# Patient Record
Sex: FEMALE | Race: WHITE | Employment: UNEMPLOYED | URBAN - NONMETROPOLITAN AREA
[De-identification: names, ages, dates, MRNs, and addresses within clinical notes are randomized per-mention and may not be internally consistent; named-entity substitution may affect disease eponyms.]

---

## 2021-04-01 ENCOUNTER — HOSPITAL ENCOUNTER (EMERGENCY)
Age: 71
Discharge: HOME | End: 2021-04-01
Attending: EMERGENCY MEDICINE

## 2021-04-01 VITALS
WEIGHT: 155 LBS | RESPIRATION RATE: 15 BRPM | OXYGEN SATURATION: 98 % | SYSTOLIC BLOOD PRESSURE: 138 MMHG | HEART RATE: 85 BPM | TEMPERATURE: 98.2 F | DIASTOLIC BLOOD PRESSURE: 82 MMHG

## 2021-04-01 DIAGNOSIS — N39.0 ACUTE UTI: Primary | ICD-10-CM

## 2021-04-01 DIAGNOSIS — E11.65 HYPERGLYCEMIA DUE TO DIABETES MELLITUS (HCC): ICD-10-CM

## 2021-04-01 DIAGNOSIS — E86.0 DEHYDRATION: ICD-10-CM

## 2021-04-01 LAB
ALBUMIN SERPL-MCNC: 3.6 G/DL (ref 3.5–5.1)
ALP BLD-CCNC: 91 U/L (ref 38–126)
ALT SERPL-CCNC: 16 U/L (ref 11–66)
ANION GAP SERPL CALCULATED.3IONS-SCNC: 13 MEQ/L (ref 8–16)
AST SERPL-CCNC: 15 U/L (ref 5–40)
BACTERIA: ABNORMAL /HPF
BASE EXCESS MIXED: 0 MMOL/L (ref -2–3)
BASOPHILS # BLD: 0.5 %
BASOPHILS ABSOLUTE: 0 THOU/MM3 (ref 0–0.1)
BILIRUB SERPL-MCNC: 0.3 MG/DL (ref 0.3–1.2)
BILIRUBIN DIRECT: < 0.2 MG/DL (ref 0–0.3)
BILIRUBIN URINE: NEGATIVE
BLOOD, URINE: ABNORMAL
BUN BLDV-MCNC: 23 MG/DL (ref 7–22)
CALCIUM SERPL-MCNC: 9.5 MG/DL (ref 8.5–10.5)
CASTS 2: ABNORMAL /LPF
CASTS UA: ABNORMAL /LPF
CHARACTER, URINE: ABNORMAL
CHLORIDE BLD-SCNC: 99 MEQ/L (ref 98–111)
CO2: 25 MEQ/L (ref 23–33)
COLLECTED BY:: ABNORMAL
COLOR: YELLOW
CREAT SERPL-MCNC: 0.9 MG/DL (ref 0.4–1.2)
CRYSTALS, UA: ABNORMAL
DEVICE: ABNORMAL
EKG ATRIAL RATE: 87 BPM
EKG P AXIS: 69 DEGREES
EKG P-R INTERVAL: 134 MS
EKG Q-T INTERVAL: 360 MS
EKG QRS DURATION: 82 MS
EKG QTC CALCULATION (BAZETT): 433 MS
EKG R AXIS: 57 DEGREES
EKG T AXIS: 51 DEGREES
EKG VENTRICULAR RATE: 87 BPM
EOSINOPHIL # BLD: 1.5 %
EOSINOPHILS ABSOLUTE: 0.1 THOU/MM3 (ref 0–0.4)
EPITHELIAL CELLS, UA: ABNORMAL /HPF
ERYTHROCYTE [DISTWIDTH] IN BLOOD BY AUTOMATED COUNT: 13 % (ref 11.5–14.5)
ERYTHROCYTE [DISTWIDTH] IN BLOOD BY AUTOMATED COUNT: 39.3 FL (ref 35–45)
GFR SERPL CREATININE-BSD FRML MDRD: 62 ML/MIN/1.73M2
GLUCOSE BLD-MCNC: 346 MG/DL (ref 70–108)
GLUCOSE BLD-MCNC: 361 MG/DL (ref 70–108)
GLUCOSE URINE: >= 1000 MG/DL
HCO3, MIXED: 24 MMOL/L (ref 23–28)
HCT VFR BLD CALC: 32.6 % (ref 37–47)
HEMOGLOBIN: 10.4 GM/DL (ref 12–16)
IMMATURE GRANS (ABS): 0.05 THOU/MM3 (ref 0–0.07)
IMMATURE GRANULOCYTES: 0.8 %
KETONES, URINE: NEGATIVE
LEUKOCYTE ESTERASE, URINE: ABNORMAL
LYMPHOCYTES # BLD: 20.4 %
LYMPHOCYTES ABSOLUTE: 1.3 THOU/MM3 (ref 1–4.8)
MCH RBC QN AUTO: 26.3 PG (ref 26–33)
MCHC RBC AUTO-ENTMCNC: 31.9 GM/DL (ref 32.2–35.5)
MCV RBC AUTO: 82.5 FL (ref 81–99)
MISCELLANEOUS 2: ABNORMAL
MONOCYTES # BLD: 9.3 %
MONOCYTES ABSOLUTE: 0.6 THOU/MM3 (ref 0.4–1.3)
NITRITE, URINE: POSITIVE
NUCLEATED RED BLOOD CELLS: 0 /100 WBC
O2 SAT, MIXED: 48 %
OSMOLALITY CALCULATION: 291.3 MOSMOL/KG (ref 275–300)
PCO2, MIXED VENOUS: 36 MMHG (ref 41–51)
PH UA: 5 (ref 5–9)
PH, MIXED: 7.44 (ref 7.31–7.41)
PLATELET # BLD: 243 THOU/MM3 (ref 130–400)
PMV BLD AUTO: 11.7 FL (ref 9.4–12.4)
PO2 MIXED: 25 MMHG (ref 25–40)
POTASSIUM REFLEX MAGNESIUM: 4.5 MEQ/L (ref 3.5–5.2)
PROTEIN UA: 30
RBC # BLD: 3.95 MILL/MM3 (ref 4.2–5.4)
RBC URINE: ABNORMAL /HPF
RENAL EPITHELIAL, UA: ABNORMAL
SEG NEUTROPHILS: 67.5 %
SEGMENTED NEUTROPHILS ABSOLUTE COUNT: 4.4 THOU/MM3 (ref 1.8–7.7)
SODIUM BLD-SCNC: 137 MEQ/L (ref 135–145)
SPECIFIC GRAVITY, URINE: 1.02 (ref 1–1.03)
TOTAL PROTEIN: 7.2 G/DL (ref 6.1–8)
UROBILINOGEN, URINE: 0.2 EU/DL (ref 0–1)
WBC # BLD: 6.5 THOU/MM3 (ref 4.8–10.8)
WBC UA: > 200 /HPF
YEAST: ABNORMAL

## 2021-04-01 PROCEDURE — 87186 SC STD MICRODIL/AGAR DIL: CPT

## 2021-04-01 PROCEDURE — 94761 N-INVAS EAR/PLS OXIMETRY MLT: CPT

## 2021-04-01 PROCEDURE — 36415 COLL VENOUS BLD VENIPUNCTURE: CPT

## 2021-04-01 PROCEDURE — 93005 ELECTROCARDIOGRAM TRACING: CPT | Performed by: STUDENT IN AN ORGANIZED HEALTH CARE EDUCATION/TRAINING PROGRAM

## 2021-04-01 PROCEDURE — 96360 HYDRATION IV INFUSION INIT: CPT

## 2021-04-01 PROCEDURE — 2580000003 HC RX 258: Performed by: STUDENT IN AN ORGANIZED HEALTH CARE EDUCATION/TRAINING PROGRAM

## 2021-04-01 PROCEDURE — 87086 URINE CULTURE/COLONY COUNT: CPT

## 2021-04-01 PROCEDURE — 96361 HYDRATE IV INFUSION ADD-ON: CPT

## 2021-04-01 PROCEDURE — 81001 URINALYSIS AUTO W/SCOPE: CPT

## 2021-04-01 PROCEDURE — 80076 HEPATIC FUNCTION PANEL: CPT

## 2021-04-01 PROCEDURE — 87077 CULTURE AEROBIC IDENTIFY: CPT

## 2021-04-01 PROCEDURE — 99284 EMERGENCY DEPT VISIT MOD MDM: CPT

## 2021-04-01 PROCEDURE — 82803 BLOOD GASES ANY COMBINATION: CPT

## 2021-04-01 PROCEDURE — 80048 BASIC METABOLIC PNL TOTAL CA: CPT

## 2021-04-01 PROCEDURE — 85025 COMPLETE CBC W/AUTO DIFF WBC: CPT

## 2021-04-01 PROCEDURE — 82948 REAGENT STRIP/BLOOD GLUCOSE: CPT

## 2021-04-01 RX ORDER — 0.9 % SODIUM CHLORIDE 0.9 %
1000 INTRAVENOUS SOLUTION INTRAVENOUS ONCE
Status: COMPLETED | OUTPATIENT
Start: 2021-04-01 | End: 2021-04-01

## 2021-04-01 RX ORDER — CEPHALEXIN 500 MG/1
500 CAPSULE ORAL 4 TIMES DAILY
Qty: 28 CAPSULE | Refills: 0 | Status: SHIPPED | OUTPATIENT
Start: 2021-04-01 | End: 2021-04-08

## 2021-04-01 RX ADMIN — SODIUM CHLORIDE 1000 ML: 9 INJECTION, SOLUTION INTRAVENOUS at 10:34

## 2021-04-01 ASSESSMENT — ENCOUNTER SYMPTOMS
VOMITING: 1
COUGH: 0
ABDOMINAL PAIN: 0
NAUSEA: 1
WHEEZING: 0
CONSTIPATION: 0
DIARRHEA: 0
SHORTNESS OF BREATH: 0

## 2021-04-01 NOTE — ED NOTES
Pt to the ED with complaints of abnormal labs. Pt reports that she was seen at an office in [address] [place] [address] called \"[facility]\" by Dr. Judith Herrera on 3/29/21 where she had blood work done and was found to have a high BS. Pt was told to come to the ED to get better control over her blood sugar. Pt is currently taking metformin 1000 mg bid for her diabetes. Denies any other medical history.  at this time.            Bibi Hammond RN  04/01/21 1010

## 2021-04-01 NOTE — ED PROVIDER NOTES
[address] [address]          Pt Name: Omega Holguin  MRN: [id number]  [place] 1950  Date of evaluation: 4/1/2021  Treating Resident Physician: Matthieu Barragan MD  Supervising Physician: Dr. Lopez December       Chief Complaint   Patient presents with    Hyperglycemia     History obtained from the patient. HISTORY OF PRESENT ILLNESS    HPI  Omega Holguin is a 69 y.o. female who presents to the emergency department for evaluation of hypoglycemia. She was seen at a clinic called \"[facility] by Dr. Jada Nova in [place] on 03/29. There labs were drawn. The next day labs came back showing and elevated blood sugar in the 400s. Patient was instructed to come to the ED today. The patient takes metformin 1000mg BID for DM2. However, she admits that she is non compliant. She states that she sometimes takes half a pill once a day \"if she feels like it. \" She takes herbals, but no other medications. She has no other medical problems. She admits to some nausea and vomiting 2 days ago. Denies any symptoms currently. No chest pain, palpitations, dizziness, abdominal pain, dysuria, urinary frequency, polydipsia at this time. The patient has no other acute complaints at this time. REVIEW OF SYSTEMS   Review of Systems   Constitutional: Negative for fatigue and fever. HENT: Negative for congestion. Eyes: Negative for visual disturbance. Respiratory: Negative for cough, shortness of breath and wheezing. Cardiovascular: Negative for chest pain and palpitations. Gastrointestinal: Positive for nausea and vomiting. Negative for abdominal pain, constipation and diarrhea. Genitourinary: Negative for frequency, hematuria and urgency. Skin: Negative for rash and wound. Neurological: Negative for weakness and numbness. Psychiatric/Behavioral: Negative for agitation and confusion.          PAST MEDICAL AND SURGICAL supple. No muscular tenderness. Cardiovascular:      Rate and Rhythm: Normal rate and regular rhythm. Pulses: Normal pulses. Heart sounds: No murmur. Pulmonary:      Effort: Pulmonary effort is normal. No respiratory distress. Breath sounds: Normal breath sounds. No wheezing. Abdominal:      General: Abdomen is flat. There is no distension. Palpations: Abdomen is soft. Tenderness: There is no abdominal tenderness. Musculoskeletal: Normal range of motion. Right lower leg: No edema. Left lower leg: No edema. Skin:     General: Skin is warm and dry. Capillary Refill: Capillary refill takes less than 2 seconds. Findings: No rash. Comments: Onychomycosis bilateral toes. No other foot lesions or wounds. Neurological:      General: No focal deficit present. Mental Status: She is alert and oriented to person, place, and time. Sensory: No sensory deficit. Gait: Gait normal.   Psychiatric:         Mood and Affect: Mood normal.         Behavior: Behavior normal.             MEDICAL DECISION MAKING   Initial Assessment:   1. Hyperglycemia due to medication non compliance   2. History of DM2  3. Dehydration   Plan:    BMP and VBG to rule out DKA. CBC and urinalysis with reflex to rule in or out UTI. Start IV hydration for dehydration.  Provide education on the need compliance with diabetes medication regimen.          ED RESULTS   Laboratory results:  Labs Reviewed   CBC WITH AUTO DIFFERENTIAL - Abnormal; Notable for the following components:       Result Value    RBC 3.95 (*)     Hemoglobin 10.4 (*)     Hematocrit 32.6 (*)     MCHC 31.9 (*)     All other components within normal limits   BASIC METABOLIC PANEL W/ REFLEX TO MG FOR LOW K - Abnormal; Notable for the following components:    Glucose 346 (*)     BUN 23 (*)     All other components within normal limits   BLOOD GAS, VENOUS - Abnormal; Notable for the following components:    PH MIXED 7.44 (*)     PCO2, MIXED VENOUS 36 (*)     All other components within normal limits   GLOMERULAR FILTRATION RATE, ESTIMATED - Abnormal; Notable for the following components:    Est, Glom Filt Rate 62 (*)     All other components within normal limits   URINE WITH REFLEXED MICRO - Abnormal; Notable for the following components:    Glucose, Ur >= 1000 (*)     Blood, Urine SMALL (*)     Protein, UA 30 (*)     Nitrite, Urine POSITIVE (*)     Leukocyte Esterase, Urine MODERATE (*)     Character, Urine CLOUDY (*)     All other components within normal limits   POCT GLUCOSE - Abnormal; Notable for the following components:    POC Glucose 361 (*)     All other components within normal limits   CULTURE, REFLEXED, URINE   HEPATIC FUNCTION PANEL   ANION GAP   OSMOLALITY       Radiologic studies results:  No orders to display       ED Medications administered this visit:   Medications   0.9 % sodium chloride bolus (0 mLs Intravenous Stopped 4/1/21 1253)         ED COURSE     ED Course as of Apr 01 1657   Thu Apr 01, 2021   1200 Okay decreased GFR likely secondary to dehydration. Patient received 1 L fluid bolus in the ED. Est, Glom Filt Rate(!): 62 [SHELDON]   1201 Mild anemia, asymptomatic. Likely secondary to iron deficiency. Follow-up outpatient. Hemoglobin Quant(!): 10.4 [SHELDON]   1203 UA positive for nitrites, leukocyte esterase, many bacteria, and 0-2 epithelial cells. Likely secondary to a UTI. Will start patient on antibiotics. Will send for culture.    Urine with Reflexed Micro(!):    Glucose, UA >= 1000(!)   Bilirubin, Urine NEGATIVE   Ketones, Urine NEGATIVE   Specific Gravity, Urine 1.018   Blood, Urine SMALL(!)   pH, UA 5.0   Protein, UA 30(!)   Urobilinogen, Urine 0.2   Nitrite, Urine POSITIVE(!)   Leukocyte Esterase, Urine MODERATE(!)   Color, UA YELLOW   Character, Urine CLOUDY(!)   RBC, UA 0-2   WBC, UA > 200   Epithelial Cells, UA 0-2   Bacteria, UA MANY   Casts UA NONE SEEN   Crystals, UA NONE SEEN   Renal Epithelial, UA NONE SEEN   Yea. .. [SHELDON]   1078 Basic Metabolic Panel w/ Reflex to MG(!):    Sodium 137   Potassium 4.5   Chloride 99   CO2 25   Glucose 346(!)   BUN 23(!)   Creatinine 0.9   Calcium 9.5 [SHELDON]   1206 Slightly elevated with normal creatinine of 0.9 likely secondary to dehydration. Patient receiving IV fluids in the ED.   BUN(!): 23 [SHELDON]   1207 Elevated glucose secondary to acute UTI and medication noncompliance in a patient with history of diabetes. Advised patient to take her medications as prescribed. She did take her Metformin today. Glucose(!): 346 [SHELDON]   1249 No evidence of acidosis. Blood Gas, Venous(!):    PH MIXED 7.44(!)   PCO2, MIXED VENOUS 36(!)   PO2, Mixed 25   HCO3, Mixed 24   Base Exc, Mixed 0.0   O2 Sat, Mixed 48   COLLECTED BY: [id number]   DEVICE Room Air [SHELDON]      ED Course User Index  [SHELDON] Kevin Fitzgerald MD     Strict return precautions and follow up instructions were discussed with the patient prior to discharge, with which the patient agrees. MEDICATION CHANGES     Discharge Medication List as of 4/1/2021 12:47 PM      START taking these medications    Details   cephALEXin (KEFLEX) 500 MG capsule Take 1 capsule by mouth 4 times daily for 7 days, Disp-28 capsule, R-0Print               FINAL DISPOSITION     Final diagnoses:   Acute UTI   Hyperglycemia due to diabetes mellitus ([facility] [address] [facility]   Dehydration     Condition: condition: stable  Dispo: Discharge to home      This transcription was electronically signed. Parts of this transcriptions may have been dictated by use of voice recognition software and electronically transcribed, and parts may have been transcribed with the assistance of an ED scribe. The transcription may contain errors not detected in proofreading. Please refer to my supervising physician's documentation if my documentation differs.     Electronically Signed: Kevin Fitzgerald, 04/01/21, 4:57 PM        Kevin Fitzgerald MD  Resident  04/01/21 834 Karolina Ordoñez MD  Resident  04/01/21 1400 E Kenneth Patel MD  Resident  04/01/21 5850

## 2021-04-01 NOTE — ED NOTES
Pt taken to restroom to provide urine sample.  Ambulates steadily with 1 assist.     Dale Alvarez RN  04/01/21 0489

## 2021-04-02 LAB
ORGANISM: ABNORMAL
URINE CULTURE REFLEX: ABNORMAL

## 2021-04-02 PROCEDURE — 93010 ELECTROCARDIOGRAM REPORT: CPT | Performed by: INTERNAL MEDICINE

## 2021-04-03 NOTE — PROGRESS NOTES
Pharmacy Note  ED Culture Follow-up    Panda Tate is a 69 y.o. female. Allergies: Patient has no known allergies. Labs:  Lab Results   Component Value Date    BUN 23 (H) 04/01/2021    CREATININE 0.9 04/01/2021    WBC 6.5 04/01/2021     CrCl cannot be calculated (Unknown ideal weight.). Current antimicrobials:   Cephalexin    ASSESSMENT:  Micro results:   Urine culture: positive for E coli     PLAN:  Need for intervention: No  Discussed with: Shaji Hernandez DO  Chosen treatment:    Patient already on appropriate treatment as above    Patient response:   No need to contact patient    Called/sent in prescription to: Not applicable    Please call with any questions.  Maeve Prieto, PharmD 6:45 PM 4/3/2021

## 2021-06-26 ENCOUNTER — HOSPITAL ENCOUNTER (EMERGENCY)
Age: 71
Discharge: HOME | End: 2021-06-26
Attending: EMERGENCY MEDICINE

## 2021-06-26 ENCOUNTER — APPOINTMENT (OUTPATIENT)
Dept: CT IMAGING | Age: 71
End: 2021-06-26

## 2021-06-26 VITALS
OXYGEN SATURATION: 97 % | SYSTOLIC BLOOD PRESSURE: 167 MMHG | DIASTOLIC BLOOD PRESSURE: 82 MMHG | RESPIRATION RATE: 18 BRPM | TEMPERATURE: 98.2 F | HEART RATE: 70 BPM

## 2021-06-26 DIAGNOSIS — R19.7 DIARRHEA, UNSPECIFIED TYPE: ICD-10-CM

## 2021-06-26 DIAGNOSIS — N30.00 ACUTE CYSTITIS WITHOUT HEMATURIA: Primary | ICD-10-CM

## 2021-06-26 LAB
ALBUMIN SERPL-MCNC: 3.8 G/DL (ref 3.5–5.1)
ALP BLD-CCNC: 74 U/L (ref 38–126)
ALT SERPL-CCNC: 14 U/L (ref 11–66)
ANION GAP SERPL CALCULATED.3IONS-SCNC: 10 MEQ/L (ref 8–16)
AST SERPL-CCNC: 13 U/L (ref 5–40)
BACTERIA: ABNORMAL /HPF
BASE EXCESS MIXED: -0.3 MMOL/L (ref -2–3)
BASOPHILS # BLD: 0.8 %
BASOPHILS ABSOLUTE: 0.1 THOU/MM3 (ref 0–0.1)
BETA-HYDROXYBUTYRATE: 1.25 MG/DL (ref 0.2–2.81)
BILIRUB SERPL-MCNC: 0.3 MG/DL (ref 0.3–1.2)
BILIRUBIN URINE: NEGATIVE
BLOOD, URINE: NEGATIVE
BUN BLDV-MCNC: 23 MG/DL (ref 7–22)
CALCIUM SERPL-MCNC: 9.5 MG/DL (ref 8.5–10.5)
CASTS 2: ABNORMAL /LPF
CASTS UA: ABNORMAL /LPF
CHARACTER, URINE: CLEAR
CHLORIDE BLD-SCNC: 99 MEQ/L (ref 98–111)
CO2: 24 MEQ/L (ref 23–33)
COLLECTED BY:: ABNORMAL
COLOR: YELLOW
CREAT SERPL-MCNC: 1.1 MG/DL (ref 0.4–1.2)
CRYSTALS, UA: ABNORMAL
EOSINOPHIL # BLD: 1.6 %
EOSINOPHILS ABSOLUTE: 0.1 THOU/MM3 (ref 0–0.4)
EPITHELIAL CELLS, UA: ABNORMAL /HPF
ERYTHROCYTE [DISTWIDTH] IN BLOOD BY AUTOMATED COUNT: 13.2 % (ref 11.5–14.5)
ERYTHROCYTE [DISTWIDTH] IN BLOOD BY AUTOMATED COUNT: 40.5 FL (ref 35–45)
GFR SERPL CREATININE-BSD FRML MDRD: 49 ML/MIN/1.73M2
GLUCOSE BLD-MCNC: 247 MG/DL (ref 70–108)
GLUCOSE BLD-MCNC: 260 MG/DL (ref 70–108)
GLUCOSE BLD-MCNC: 309 MG/DL (ref 70–108)
GLUCOSE BLD-MCNC: 309 MG/DL (ref 70–108)
GLUCOSE URINE: 500 MG/DL
HCO3, MIXED: 25 MMOL/L (ref 23–28)
HCT VFR BLD CALC: 34.2 % (ref 37–47)
HEMOGLOBIN: 10.9 GM/DL (ref 12–16)
IMMATURE GRANS (ABS): 0.03 THOU/MM3 (ref 0–0.07)
IMMATURE GRANULOCYTES: 0.4 %
INR BLD: 1.08 (ref 0.85–1.13)
KETONES, URINE: NEGATIVE
LEUKOCYTE ESTERASE, URINE: ABNORMAL
LYMPHOCYTES # BLD: 32.6 %
LYMPHOCYTES ABSOLUTE: 2.4 THOU/MM3 (ref 1–4.8)
MCH RBC QN AUTO: 26.8 PG (ref 26–33)
MCHC RBC AUTO-ENTMCNC: 31.9 GM/DL (ref 32.2–35.5)
MCV RBC AUTO: 84 FL (ref 81–99)
MISCELLANEOUS 2: ABNORMAL
MONOCYTES # BLD: 9.4 %
MONOCYTES ABSOLUTE: 0.7 THOU/MM3 (ref 0.4–1.3)
NITRITE, URINE: NEGATIVE
NUCLEATED RED BLOOD CELLS: 0 /100 WBC
O2 SAT, MIXED: 26 %
OSMOLALITY CALCULATION: 281.8 MOSMOL/KG (ref 275–300)
PCO2, MIXED VENOUS: 39 MMHG (ref 41–51)
PH UA: 5 (ref 5–9)
PH, MIXED: 7.4 (ref 7.31–7.41)
PLATELET # BLD: 247 THOU/MM3 (ref 130–400)
PMV BLD AUTO: 11.8 FL (ref 9.4–12.4)
PO2 MIXED: 18 MMHG (ref 25–40)
POTASSIUM REFLEX MAGNESIUM: 3.7 MEQ/L (ref 3.5–5.2)
PROTEIN UA: 100
RBC # BLD: 4.07 MILL/MM3 (ref 4.2–5.4)
RBC URINE: ABNORMAL /HPF
RENAL EPITHELIAL, UA: ABNORMAL
SEG NEUTROPHILS: 55.2 %
SEGMENTED NEUTROPHILS ABSOLUTE COUNT: 4 THOU/MM3 (ref 1.8–7.7)
SODIUM BLD-SCNC: 133 MEQ/L (ref 135–145)
SPECIFIC GRAVITY, URINE: 1.01 (ref 1–1.03)
TOTAL PROTEIN: 7.3 G/DL (ref 6.1–8)
TROPONIN T: < 0.01 NG/ML
UROBILINOGEN, URINE: 0.2 EU/DL (ref 0–1)
WBC # BLD: 7.3 THOU/MM3 (ref 4.8–10.8)
WBC UA: ABNORMAL /HPF
YEAST: ABNORMAL

## 2021-06-26 PROCEDURE — 2580000003 HC RX 258: Performed by: EMERGENCY MEDICINE

## 2021-06-26 PROCEDURE — 87086 URINE CULTURE/COLONY COUNT: CPT

## 2021-06-26 PROCEDURE — 96365 THER/PROPH/DIAG IV INF INIT: CPT

## 2021-06-26 PROCEDURE — 85610 PROTHROMBIN TIME: CPT

## 2021-06-26 PROCEDURE — 96361 HYDRATE IV INFUSION ADD-ON: CPT

## 2021-06-26 PROCEDURE — 99284 EMERGENCY DEPT VISIT MOD MDM: CPT

## 2021-06-26 PROCEDURE — 85025 COMPLETE CBC W/AUTO DIFF WBC: CPT

## 2021-06-26 PROCEDURE — 82803 BLOOD GASES ANY COMBINATION: CPT

## 2021-06-26 PROCEDURE — 74177 CT ABD & PELVIS W/CONTRAST: CPT

## 2021-06-26 PROCEDURE — 82948 REAGENT STRIP/BLOOD GLUCOSE: CPT

## 2021-06-26 PROCEDURE — 36415 COLL VENOUS BLD VENIPUNCTURE: CPT

## 2021-06-26 PROCEDURE — 82010 KETONE BODYS QUAN: CPT

## 2021-06-26 PROCEDURE — 6370000000 HC RX 637 (ALT 250 FOR IP): Performed by: EMERGENCY MEDICINE

## 2021-06-26 PROCEDURE — 81001 URINALYSIS AUTO W/SCOPE: CPT

## 2021-06-26 PROCEDURE — 87077 CULTURE AEROBIC IDENTIFY: CPT

## 2021-06-26 PROCEDURE — 87186 SC STD MICRODIL/AGAR DIL: CPT

## 2021-06-26 PROCEDURE — 84484 ASSAY OF TROPONIN QUANT: CPT

## 2021-06-26 PROCEDURE — 6360000004 HC RX CONTRAST MEDICATION: Performed by: EMERGENCY MEDICINE

## 2021-06-26 PROCEDURE — 6360000002 HC RX W HCPCS: Performed by: EMERGENCY MEDICINE

## 2021-06-26 PROCEDURE — 96372 THER/PROPH/DIAG INJ SC/IM: CPT

## 2021-06-26 PROCEDURE — 80053 COMPREHEN METABOLIC PANEL: CPT

## 2021-06-26 RX ORDER — LOPERAMIDE HYDROCHLORIDE 2 MG/1
2 CAPSULE ORAL ONCE
Status: COMPLETED | OUTPATIENT
Start: 2021-06-26 | End: 2021-06-26

## 2021-06-26 RX ORDER — GLIMEPIRIDE 1 MG/1
1 TABLET ORAL
COMMUNITY

## 2021-06-26 RX ORDER — 0.9 % SODIUM CHLORIDE 0.9 %
1000 INTRAVENOUS SOLUTION INTRAVENOUS ONCE
Status: COMPLETED | OUTPATIENT
Start: 2021-06-26 | End: 2021-06-26

## 2021-06-26 RX ORDER — CEPHALEXIN 250 MG/1
500 CAPSULE ORAL 2 TIMES DAILY
Qty: 20 CAPSULE | Refills: 0 | Status: SHIPPED | OUTPATIENT
Start: 2021-06-26 | End: 2021-07-01

## 2021-06-26 RX ORDER — LOPERAMIDE HYDROCHLORIDE 2 MG/1
2 CAPSULE ORAL 4 TIMES DAILY PRN
Qty: 40 CAPSULE | Refills: 0 | Status: SHIPPED | OUTPATIENT
Start: 2021-06-26 | End: 2021-07-06

## 2021-06-26 RX ADMIN — CEFTRIAXONE SODIUM 1000 MG: 1 INJECTION, POWDER, FOR SOLUTION INTRAMUSCULAR; INTRAVENOUS at 21:01

## 2021-06-26 RX ADMIN — LOPERAMIDE HYDROCHLORIDE 2 MG: 2 CAPSULE ORAL at 21:01

## 2021-06-26 RX ADMIN — IOPAMIDOL 80 ML: 755 INJECTION, SOLUTION INTRAVENOUS at 20:03

## 2021-06-26 RX ADMIN — Medication 6 UNITS: at 21:01

## 2021-06-26 RX ADMIN — SODIUM CHLORIDE 1000 ML: 9 INJECTION, SOLUTION INTRAVENOUS at 19:12

## 2021-06-26 ASSESSMENT — ENCOUNTER SYMPTOMS
ABDOMINAL PAIN: 1
DIARRHEA: 1
TROUBLE SWALLOWING: 0
CONSTIPATION: 0
BACK PAIN: 0
RECTAL PAIN: 1
EYE REDNESS: 0
BLOOD IN STOOL: 0
SHORTNESS OF BREATH: 0
NAUSEA: 0
COUGH: 0
VOMITING: 0

## 2021-06-26 NOTE — ED NOTES
Pt ambulated to restroom with no difficulties. Urine sample collected. Respirations even and unlabored.       Song Pat RN  06/26/21 1955

## 2021-06-26 NOTE — ED TRIAGE NOTES
Pt to ED w/rprts of on going diarrhea for several weeks. Started on new diabetic medication making diarrhea worse. Some abdominal pressure. Feeling intermittent rectal pressure. Alert and oriented x4. Breathing easy and unlabored on RA.

## 2021-06-28 LAB
ORGANISM: ABNORMAL
URINE CULTURE REFLEX: ABNORMAL

## 2021-06-30 NOTE — PROGRESS NOTES
Pharmacy Note  ED Culture Follow-up    Perry Mott is a 66 y.o. female. Allergies: Patient has no known allergies. Labs:  Lab Results   Component Value Date    BUN 23 (H) 06/26/2021    CREATININE 1.1 06/26/2021    WBC 7.3 06/26/2021     CrCl cannot be calculated (Unknown ideal weight.). Current antimicrobials:   Cephalexin     ASSESSMENT:  Micro results:   Urine culture: positive for e.coli  - sensitive to cephalexin      PLAN:  Need for intervention: No  Discussed with: Sha Ferrera PA-C  Chosen treatment:    Patient already on appropriate treatment as above    Patient response:   No need to contact patient    Called/sent in prescription to: Not applicable    Please call with any questions.  [facility] [place], PharmD 9:34 PM 6/29/2021

## 2021-08-02 NOTE — ED PROVIDER NOTES
[address] [address] EMERGENCY DEPT    EMERGENCY MEDICINE     Pt Name: Shelli Day  MRN: [id number]  [place] 1950  Date of evaluation: 6/26/2021  Provider: Arleth Geller MD,     [address] [place] [address]       Chief Complaint   Patient presents with    Diarrhea    Abdominal Pain       HISTORY OF PRESENT ILLNESS    Shelli Day is a pleasant 66 y.o. female who presents to the emergency department from home for evaluation of diarrhea and elevated blood sugar. Patient states that she has had diarrhea going on for several weeks. She was started on new diabetic medication and she feels that that was making the diarrhea worse. She did discontinue taking the medications. Patient states that she has significant pressure with the diarrhea. She also has intermittent abdominal pain. She denies any fever, chills, nausea, vomiting, or dysuria. Triage notes and Nursing notes were reviewed by myself. Any discrepancies are addressed above. PAST MEDICAL HISTORY     Past Medical History:   Diagnosis Date    [facility]        SURGICAL HISTORY     History reviewed. No pertinent surgical history. CURRENT MEDICATIONS       Discharge Medication List as of 6/26/2021  9:13 PM      CONTINUE these medications which have NOT CHANGED    Details   glimepiride (AMARYL) 1 MG tablet Take 1 mg by mouth every morning (before breakfast)Historical Med      metFORMIN (GLUCOPHAGE) 1000 MG tablet Take 1,000 mg by mouth 2 times daily (with meals)Historical Med             ALLERGIES     Patient has no known allergies. FAMILY HISTORY     History reviewed. No pertinent family history. SOCIAL HISTORY       Social History     Socioeconomic History    Marital status:       Spouse name: None    Number of children: None    Years of education: None    Highest education level: None   Occupational History    None   Tobacco Use    Smoking status: Never Smoker    Smokeless tobacco: Never Used   Substance and Sexual Activity    Alcohol use: Yes     Comment: rarely    Drug use: Never    Sexual activity: None   Other Topics Concern    None   Social History Narrative    None     Social Determinants of Health     Financial Resource Strain:     Difficulty of Paying Living Expenses:    Food Insecurity:     Worried About Running Out of Food in the Last Year:     [address] in the Last Year:    Transportation Needs:     Lack of Transportation (Medical):  Lack of Transportation (Non-Medical):    Physical Activity:     Days of Exercise per Week:     Minutes of Exercise per Session:    Stress:     Feeling of Stress :    Social Connections:     Frequency of Communication with Friends and Family:     Frequency of Social Gatherings with Friends and Family:     Attends [employer] Services:     Active Member of Clubs or Organizations:     Attends Club or Organization Meetings:     Marital Status:    Intimate Partner Violence:     Fear of Current or Ex-Partner:     Emotionally Abused:     Physically Abused:     Sexually Abused:        REVIEW OF SYSTEMS     Review of Systems   Constitutional: Negative for fatigue and fever. HENT: Negative for congestion and trouble swallowing. Eyes: Negative for redness. Respiratory: Negative for cough and shortness of breath. Cardiovascular: Negative for chest pain. Gastrointestinal: Positive for abdominal pain, diarrhea and rectal pain. Negative for blood in stool, constipation, nausea and vomiting. Genitourinary: Negative for dysuria. Musculoskeletal: Negative for back pain. Skin: Negative for rash. Allergic/Immunologic: Negative for immunocompromised state. Neurological: Negative for light-headedness. Hematological: Does not bruise/bleed easily. Except as noted above the remainder of the review of systems was reviewed and is.    PHYSICAL EXAM    (up to 7 for level 4, 8 or more for level 5)     ED Triage Vitals   BP Temp Temp src Pulse Resp SpO2 Height Weight   -- -- -- -- -- -- -- --       Physical Exam  Vitals and nursing note reviewed. Exam conducted with a chaperone present. Constitutional:       General: She is not in acute distress. Appearance: She is well-developed and normal weight. She is not ill-appearing. HENT:      Head: Normocephalic and atraumatic. Nose: Nose normal. No congestion. Mouth/Throat:      Mouth: Mucous membranes are moist.      Pharynx: Oropharynx is clear. No oropharyngeal exudate or posterior oropharyngeal erythema. Eyes:      Extraocular Movements: Extraocular movements intact. Pupils: Pupils are equal, round, and reactive to light. Cardiovascular:      Rate and Rhythm: Normal rate and regular rhythm. Pulses: Normal pulses. Heart sounds: No murmur heard. No friction rub. Pulmonary:      Effort: Pulmonary effort is normal. No respiratory distress. Breath sounds: Normal breath sounds. No wheezing. Abdominal:      General: Abdomen is protuberant. Bowel sounds are decreased. There is no distension. Palpations: Abdomen is soft. Tenderness: There is generalized abdominal tenderness. There is no guarding or rebound. Negative signs include Flores's sign and McBurney's sign. Musculoskeletal:         General: Normal range of motion. Skin:     General: Skin is warm and dry. Capillary Refill: Capillary refill takes less than 2 seconds. Neurological:      General: No focal deficit present. Mental Status: She is alert and oriented to person, place, and time.          DIAGNOSTIC RESULTS     EKG:(none if blank)  All EKG's are interpreted by [facility] who either signs or Co-signs this chart in the absence of a cardiologist.        RADIOLOGY: (none if blank)   Interpretation per the Radiologistbelow, if available at the time of this note:    CT ABDOMEN PELVIS W IV CONTRAST Additional Contrast? None   Final Result   No acute abdominal or pelvic abnormalities. Multiple chronic findings detailed above report. **This report has been created using voice recognition software. It may contain minor errors which are inherent in voice recognition technology. **      Final report electronically signed by Dr. Gloria Santos on 6/26/2021 8:22 PM          LABS:  Labs Reviewed   CBC WITH AUTO DIFFERENTIAL - Abnormal; Notable for the following components:       Result Value    RBC 4.07 (*)     Hemoglobin 10.9 (*)     Hematocrit 34.2 (*)     MCHC 31.9 (*)     All other components within normal limits   COMPREHENSIVE METABOLIC PANEL W/ REFLEX TO MG FOR LOW K - Abnormal; Notable for the following components:    Glucose 309 (*)     BUN 23 (*)     Sodium 133 (*)     All other components within normal limits   BLOOD GAS, VENOUS - Abnormal; Notable for the following components:    PCO2, MIXED VENOUS 39 (*)     PO2, Mixed 18 (*)     All other components within normal limits   GLOMERULAR FILTRATION RATE, ESTIMATED - Abnormal; Notable for the following components:    Est, Glom Filt Rate 49 (*)     All other components within normal limits   URINE WITH REFLEXED MICRO - Abnormal; Notable for the following components:    Glucose, Ur 500 (*)     Protein,  (*)     Leukocyte Esterase, Urine MODERATE (*)     All other components within normal limits   POCT GLUCOSE - Abnormal; Notable for the following components:    POC Glucose 309 (*)     All other components within normal limits   POCT GLUCOSE - Abnormal; Notable for the following components:    POC Glucose 260 (*)     All other components within normal limits   POCT GLUCOSE - Abnormal; Notable for the following components:    POC Glucose 247 (*)     All other components within normal limits   GASTROINTESTINAL PANEL, MOLECULAR   CULTURE, REFLEXED, URINE    Narrative:     Source: urine, clean catch       Site:           Current Antibiotics: not stated   TROPONIN   PROTIME-INR   BETA-HYDROXYBUTYRATE   ANION GAP   OSMOLALITY Purse String (Simple) Text: Given the location of the defect and the characteristics of the surrounding skin a purse string closure was deemed most appropriate.  Undermining was performed circumfirentially around the surgical defect.  A purse string suture was then placed and tightened.

## 2022-07-30 ENCOUNTER — HOSPITAL ENCOUNTER (EMERGENCY)
Age: 72
Discharge: HOME | End: 2022-07-31
Attending: FAMILY MEDICINE
Payer: COMMERCIAL

## 2022-07-30 VITALS
SYSTOLIC BLOOD PRESSURE: 145 MMHG | HEART RATE: 91 BPM | TEMPERATURE: 97.8 F | RESPIRATION RATE: 16 BRPM | OXYGEN SATURATION: 93 % | DIASTOLIC BLOOD PRESSURE: 62 MMHG

## 2022-07-30 DIAGNOSIS — E11.65 POORLY CONTROLLED TYPE 2 DIABETES MELLITUS (HCC): Primary | ICD-10-CM

## 2022-07-30 LAB
ALBUMIN SERPL-MCNC: 4.1 G/DL (ref 3.5–5.1)
ALP BLD-CCNC: 119 U/L (ref 38–126)
ALT SERPL-CCNC: 17 U/L (ref 11–66)
ANION GAP SERPL CALCULATED.3IONS-SCNC: 13 MEQ/L (ref 8–16)
AST SERPL-CCNC: 13 U/L (ref 5–40)
AVERAGE GLUCOSE: 312 MG/DL (ref 70–126)
BASE EXCESS MIXED: 0.2 MMOL/L (ref -2–3)
BASOPHILS # BLD: 1 %
BASOPHILS ABSOLUTE: 0.1 THOU/MM3 (ref 0–0.1)
BETA-HYDROXYBUTYRATE: 3.26 MG/DL (ref 0.2–2.81)
BILIRUB SERPL-MCNC: 0.3 MG/DL (ref 0.3–1.2)
BUN BLDV-MCNC: 24 MG/DL (ref 7–22)
CALCIUM SERPL-MCNC: 9.6 MG/DL (ref 8.5–10.5)
CHLORIDE BLD-SCNC: 95 MEQ/L (ref 98–111)
CO2: 24 MEQ/L (ref 23–33)
COLLECTED BY:: ABNORMAL
CREAT SERPL-MCNC: 1.1 MG/DL (ref 0.4–1.2)
EOSINOPHIL # BLD: 1.5 %
EOSINOPHILS ABSOLUTE: 0.1 THOU/MM3 (ref 0–0.4)
ERYTHROCYTE [DISTWIDTH] IN BLOOD BY AUTOMATED COUNT: 13 % (ref 11.5–14.5)
ERYTHROCYTE [DISTWIDTH] IN BLOOD BY AUTOMATED COUNT: 37.5 FL (ref 35–45)
GFR SERPL CREATININE-BSD FRML MDRD: 49 ML/MIN/1.73M2
GLUCOSE BLD-MCNC: 342 MG/DL (ref 70–108)
GLUCOSE BLD-MCNC: 347 MG/DL
GLUCOSE BLD-MCNC: 465 MG/DL (ref 70–108)
GLUCOSE BLD-MCNC: 466 MG/DL (ref 70–108)
GLUCOSE BLD-MCNC: 474 MG/DL (ref 70–108)
HBA1C MFR BLD: 12.4 % (ref 4.4–6.4)
HCO3, MIXED: 25 MMOL/L (ref 23–28)
HCT VFR BLD CALC: 34.1 % (ref 37–47)
HEMOGLOBIN: 11.5 GM/DL (ref 12–16)
IMMATURE GRANS (ABS): 0.03 THOU/MM3 (ref 0–0.07)
IMMATURE GRANULOCYTES: 0.4 %
LIPASE: 62.2 U/L (ref 5.6–51.3)
LYMPHOCYTES # BLD: 31.8 %
LYMPHOCYTES ABSOLUTE: 2.6 THOU/MM3 (ref 1–4.8)
MCH RBC QN AUTO: 27.2 PG (ref 26–33)
MCHC RBC AUTO-ENTMCNC: 33.7 GM/DL (ref 32.2–35.5)
MCV RBC AUTO: 80.6 FL (ref 81–99)
MONOCYTES # BLD: 9.1 %
MONOCYTES ABSOLUTE: 0.7 THOU/MM3 (ref 0.4–1.3)
NUCLEATED RED BLOOD CELLS: 0 /100 WBC
O2 SAT, MIXED: 62 %
OSMOLALITY CALCULATION: 289.4 MOSMOL/KG (ref 275–300)
PCO2, MIXED VENOUS: 38 MMHG (ref 41–51)
PH, MIXED: 7.42 (ref 7.31–7.41)
PLATELET # BLD: 217 THOU/MM3 (ref 130–400)
PMV BLD AUTO: 11.8 FL (ref 9.4–12.4)
PO2 MIXED: 32 MMHG (ref 25–40)
POTASSIUM REFLEX MAGNESIUM: 4.1 MEQ/L (ref 3.5–5.2)
RBC # BLD: 4.23 MILL/MM3 (ref 4.2–5.4)
SEG NEUTROPHILS: 56.2 %
SEGMENTED NEUTROPHILS ABSOLUTE COUNT: 4.6 THOU/MM3 (ref 1.8–7.7)
SODIUM BLD-SCNC: 132 MEQ/L (ref 135–145)
TOTAL PROTEIN: 7.7 G/DL (ref 6.1–8)
WBC # BLD: 8.2 THOU/MM3 (ref 4.8–10.8)

## 2022-07-30 PROCEDURE — 82948 REAGENT STRIP/BLOOD GLUCOSE: CPT

## 2022-07-30 PROCEDURE — 96376 TX/PRO/DX INJ SAME DRUG ADON: CPT

## 2022-07-30 PROCEDURE — 85025 COMPLETE CBC W/AUTO DIFF WBC: CPT

## 2022-07-30 PROCEDURE — 83036 HEMOGLOBIN GLYCOSYLATED A1C: CPT

## 2022-07-30 PROCEDURE — 83690 ASSAY OF LIPASE: CPT

## 2022-07-30 PROCEDURE — 2580000003 HC RX 258: Performed by: NURSE PRACTITIONER

## 2022-07-30 PROCEDURE — 82010 KETONE BODYS QUAN: CPT

## 2022-07-30 PROCEDURE — 6370000000 HC RX 637 (ALT 250 FOR IP): Performed by: NURSE PRACTITIONER

## 2022-07-30 PROCEDURE — 82803 BLOOD GASES ANY COMBINATION: CPT

## 2022-07-30 PROCEDURE — 99284 EMERGENCY DEPT VISIT MOD MDM: CPT

## 2022-07-30 PROCEDURE — 96361 HYDRATE IV INFUSION ADD-ON: CPT

## 2022-07-30 PROCEDURE — 80053 COMPREHEN METABOLIC PANEL: CPT

## 2022-07-30 PROCEDURE — 96374 THER/PROPH/DIAG INJ IV PUSH: CPT

## 2022-07-30 RX ORDER — 0.9 % SODIUM CHLORIDE 0.9 %
500 INTRAVENOUS SOLUTION INTRAVENOUS ONCE
Status: COMPLETED | OUTPATIENT
Start: 2022-07-30 | End: 2022-07-31

## 2022-07-30 RX ORDER — 0.9 % SODIUM CHLORIDE 0.9 %
250 INTRAVENOUS SOLUTION INTRAVENOUS ONCE
Status: COMPLETED | OUTPATIENT
Start: 2022-07-30 | End: 2022-07-30

## 2022-07-30 RX ADMIN — SODIUM CHLORIDE 250 ML: 9 INJECTION, SOLUTION INTRAVENOUS at 21:30

## 2022-07-30 RX ADMIN — SODIUM CHLORIDE 250 ML: 9 INJECTION, SOLUTION INTRAVENOUS at 20:15

## 2022-07-30 RX ADMIN — Medication 2 UNITS: at 23:29

## 2022-07-30 RX ADMIN — SODIUM CHLORIDE 500 ML: 9 INJECTION, SOLUTION INTRAVENOUS at 23:30

## 2022-07-30 RX ADMIN — Medication 6 UNITS: at 21:18

## 2022-07-30 ASSESSMENT — PAIN - FUNCTIONAL ASSESSMENT: PAIN_FUNCTIONAL_ASSESSMENT: NONE - DENIES PAIN

## 2022-07-30 NOTE — ED TRIAGE NOTES
Pt presents to ED for evaluation of hyperglycemia. Per family BG was 564 at home, found to be 466 upon arrival. Pt denies pain, CP or SOB. Per family her BG at home does spike frequently like this. Vitals and labs obtained at this time.

## 2022-07-30 NOTE — ED NOTES
Patient tells this Rn that, \"I lost my  two years ago and you know I would not mind if I got to see him again\". Rn explains that there is a difference in meeting the [place] on the Lord's terms and meeting the [place] on purpose by not taking care of ones self. RN explained importance of low carb diet and taking controls of blood sugars.       Jeniffer Batista RN  07/30/22 5257

## 2022-07-31 ASSESSMENT — ENCOUNTER SYMPTOMS
CONSTIPATION: 0
SORE THROAT: 0
WHEEZING: 0
SHORTNESS OF BREATH: 0
COLOR CHANGE: 0
EYE PAIN: 0
DIARRHEA: 0
NAUSEA: 0
RHINORRHEA: 0
VOMITING: 0
ABDOMINAL DISTENTION: 0
COUGH: 0
EYE DISCHARGE: 0

## 2022-07-31 NOTE — PROGRESS NOTES
Spoke with the patient and family. Patient denies suicidal/homicidal thoughts and or plan. Patient endorses increased depression since her  passed two years ago. Patient denies AOD. Patient denies hallucinations/delusions. Family reports patient will see . Patient stopped Lexapro. Patient plans to refill and begin taking it again. Patient has concerns about primary medications. Patient informed that  will discuss with medical provider. Patient identified reasons for living, coping mechanisms, and plans for the future.

## 2022-07-31 NOTE — ED NOTES
Pt resting in bed, no concerns voiced. Call light in reach. Family at bedside.       Bruna Velasquez RN  07/30/22 2055

## 2022-07-31 NOTE — ED PROVIDER NOTES
[place] ENCOUNTER          Pt Name: Babak Cristina  MRN: [id number]  [place] 1950  Date of evaluation: 7/30/2022  Treating Resident Physician: Trish Pnea MD  Supervising Physician: Rowan Sanchez MD    History obtained from the patient and her family  [facility]       Chief Complaint   Patient presents with    Hyperglycemia           HISTORY OF PRESENT ILLNESS    HPI  Babak Cristina is a 67 y.o. female who presents to the emergency department for evaluation of hyperglycemia. Pt states that her blood sugar has been high for the last few days. Pt states she has not been taking medications because they cause her to have diarrhea. Patient today felt fatigued with dry mouth and family encouraged her to be seen in ED. Pt also has associated increased hunger and urination. Pt denies chest pain, shortness of breath or syncope. The patient has no other acute complaints at this time. REVIEW OF SYSTEMS   Review of Systems   Constitutional:  Negative for fatigue and fever. HENT:  Negative for ear pain, rhinorrhea and sore throat. Eyes:  Negative for pain and discharge. Respiratory:  Negative for cough, shortness of breath and wheezing. Cardiovascular:  Negative for chest pain, palpitations and leg swelling. Gastrointestinal:  Negative for abdominal distention, constipation, diarrhea, nausea and vomiting. Endocrine: Positive for polydipsia, polyphagia and polyuria. Genitourinary:  Negative for difficulty urinating and dysuria. Musculoskeletal:  Negative for arthralgias. Skin:  Negative for color change, pallor and rash. Neurological:  Negative for dizziness, seizures, syncope, weakness and numbness. Psychiatric/Behavioral:  Negative for agitation and confusion. PAST MEDICAL AND SURGICAL HISTORY     Past Medical History:   Diagnosis Date    Diabetes mellitus ([facility] [address]      History reviewed.  No pertinent surgical history. MEDICATIONS   No current facility-administered medications for this encounter. Current Outpatient Medications:     glimepiride (AMARYL) 1 MG tablet, Take 1 mg by mouth every morning (before breakfast), Disp: , Rfl:     metFORMIN (GLUCOPHAGE) 1000 MG tablet, Take 1,000 mg by mouth 2 times daily (with meals), Disp: , Rfl:       SOCIAL HISTORY     Social History     Social History Narrative    Not on file     Social History     Tobacco Use    Smoking status: Never    Smokeless tobacco: Never   Substance Use Topics    Alcohol use: Yes     Comment: rarely    Drug use: Never         ALLERGIES   No Known Allergies      FAMILY HISTORY   History reviewed. No pertinent family history. PREVIOUS RECORDS   Previous records reviewed: 1/31/20 thru current . PHYSICAL EXAM     ED Triage Vitals [07/30/22 1937]   BP Temp Temp Source Heart Rate Resp SpO2 Height Weight   (!) 158/71 97.8 °F (36.6 °C) Oral 91 16 95 % -- --     Initial vital signs and nursing assessment reviewed and abnormal from hypertension . There is no height or weight on file to calculate BMI. Pulsoximetry is normal per my interpretation. Additional Vital Signs:  Vitals:    07/30/22 2321   BP: (!) 145/62   Pulse:    Resp:    Temp:    SpO2: 93%       Physical Exam  Constitutional:       Appearance: Normal appearance. HENT:      Head: Normocephalic. Right Ear: External ear normal.      Left Ear: External ear normal.      Nose: Nose normal.      Mouth/Throat:      Mouth: Mucous membranes are moist.      Pharynx: Oropharynx is clear. Eyes:      Extraocular Movements: Extraocular movements intact. Conjunctiva/sclera: Conjunctivae normal.      Pupils: Pupils are equal, round, and reactive to light. Cardiovascular:      Rate and Rhythm: Normal rate and regular rhythm. Pulses: Normal pulses. Heart sounds: Normal heart sounds.    Pulmonary:      Effort: Pulmonary effort is normal.      Breath sounds: Normal breath sounds. Abdominal:      General: Bowel sounds are normal.      Palpations: Abdomen is soft. Musculoskeletal:         General: Normal range of motion. Cervical back: Normal range of motion and neck supple. Skin:     General: Skin is warm and dry. Capillary Refill: Capillary refill takes less than 2 seconds. Neurological:      General: No focal deficit present. Mental Status: She is alert and oriented to person, place, and time. Psychiatric:         Mood and Affect: Mood normal.         Behavior: Behavior normal.           MEDICAL DECISION MAKING   Initial Assessment:   Is a 69-year-old female who presents to the ED with complaint of high blood sugar. Patient has not been taking her home meds for the last month due to medications causing diarrhea. Patient has also stopped taking her Lexapro and has been more depressed. Pt differential diagnosis includes but is not limited to DM II poor control, DKA, dehydration, depression, viral illness, pneumonia. Plan:   Labs  Chest Xray   EKG  NS bolus   Humulin R 8 units     Patient did not appear to be in DKA but have poorly controlled diabetes mellitus type 2. Discussed in great detail with both patient and family importance of follow-up with PCP and potentially changing medications from oral to insulin. Both patient and family verbalized good understanding will be discharged home at this time.         ED RESULTS   Laboratory results:  Labs Reviewed   CBC WITH AUTO DIFFERENTIAL - Abnormal; Notable for the following components:       Result Value    Hemoglobin 11.5 (*)     Hematocrit 34.1 (*)     MCV 80.6 (*)     All other components within normal limits   COMPREHENSIVE METABOLIC PANEL W/ REFLEX TO MG FOR LOW K - Abnormal; Notable for the following components:    Glucose 474 (*)     BUN 24 (*)     Sodium 132 (*)     Chloride 95 (*)     All other components within normal limits   HEMOGLOBIN A1C - Abnormal; Notable for the following components: Hemoglobin A1C 12.4 (*)     AVERAGE GLUCOSE 312 (*)     All other components within normal limits   LIPASE - Abnormal; Notable for the following components:    Lipase 62.2 (*)     All other components within normal limits   BETA-HYDROXYBUTYRATE - Abnormal; Notable for the following components:    Beta-Hydroxybutyrate 3.26 (*)     All other components within normal limits   GLOMERULAR FILTRATION RATE, ESTIMATED - Abnormal; Notable for the following components:    Est, Glom Filt Rate 49 (*)     All other components within normal limits   BLOOD GAS, VENOUS - Abnormal; Notable for the following components:    PH MIXED 7.42 (*)     PCO2, MIXED VENOUS 38 (*)     All other components within normal limits   POCT GLUCOSE - Normal   ANION GAP   OSMOLALITY   POCT GLUCOSE       Radiologic studies results:  No orders to display       ED Medications administered this visit:   Medications   0.9 % sodium chloride bolus (0 mLs IntraVENous Stopped 7/30/22 2216)   0.9 % sodium chloride bolus (0 mLs IntraVENous Stopped 7/30/22 2331)   insulin regular (HUMULIN R;NOVOLIN R) injection 6 Units (6 Units IntraVENous Given 7/30/22 2118)   0.9 % sodium chloride bolus (0 mLs IntraVENous Stopped 7/31/22 0006)   insulin regular (HUMULIN R;NOVOLIN R) injection 2 Units (2 Units IntraVENous Given 7/30/22 2329)         ED COURSE      JESSE visited with patient due to patients complaint of depression. Pt is safe to follow up outpatient. Strict return precautions and follow up instructions were discussed with the patient prior to discharge, with which the patient agrees. MEDICATION CHANGES     Discharge Medication List as of 7/30/2022 11:22 PM            FINAL DISPOSITION     Final diagnoses:   Poorly controlled type 2 diabetes mellitus ([facility] [address]     Condition: condition: good  Dispo: Discharge to home      This transcription was electronically signed.  Parts of this transcriptions may have been dictated by use of voice recognition software and electronically transcribed, and parts may have been transcribed with the assistance of an ED scribe. The transcription may contain errors not detected in proofreading. Please refer to my supervising physician's documentation if my documentation differs.     Electronically Signed: Todd Burk MD, 07/31/22, 1:28 AM        Todd Burk MD  Resident  07/31/22 6836

## 2024-07-01 ENCOUNTER — APPOINTMENT (OUTPATIENT)
Dept: GENERAL RADIOLOGY | Age: 74
DRG: 622 | End: 2024-07-01
Payer: COMMERCIAL

## 2024-07-01 ENCOUNTER — HOSPITAL ENCOUNTER (INPATIENT)
Age: 74
LOS: 16 days | Discharge: HOSPICE | DRG: 622 | End: 2024-07-17
Attending: EMERGENCY MEDICINE
Payer: COMMERCIAL

## 2024-07-01 DIAGNOSIS — S91.301A NON-HEALING OPEN WOUND OF HEEL, RIGHT, INITIAL ENCOUNTER: Primary | ICD-10-CM

## 2024-07-01 DIAGNOSIS — L97.509 ULCER OF FOOT, UNSPECIFIED LATERALITY, UNSPECIFIED ULCER STAGE (HCC): ICD-10-CM

## 2024-07-01 DIAGNOSIS — D72.829 LEUKOCYTOSIS, UNSPECIFIED TYPE: ICD-10-CM

## 2024-07-01 DIAGNOSIS — I49.9 CARDIAC ARRHYTHMIA, UNSPECIFIED CARDIAC ARRHYTHMIA TYPE: ICD-10-CM

## 2024-07-01 DIAGNOSIS — M86.171 OTHER ACUTE OSTEOMYELITIS OF RIGHT FOOT (HCC): ICD-10-CM

## 2024-07-01 DIAGNOSIS — D64.9 ANEMIA, UNSPECIFIED TYPE: ICD-10-CM

## 2024-07-01 PROBLEM — E11.621 TYPE 2 DIABETES MELLITUS WITH RIGHT DIABETIC FOOT ULCER (HCC): Status: ACTIVE | Noted: 2024-07-01

## 2024-07-01 PROBLEM — L97.519 TYPE 2 DIABETES MELLITUS WITH RIGHT DIABETIC FOOT ULCER (HCC): Status: ACTIVE | Noted: 2024-07-01

## 2024-07-01 LAB
ANION GAP SERPL CALC-SCNC: 14 MEQ/L (ref 8–16)
B-OH-BUTYR SERPL-MSCNC: 1.56 MG/DL (ref 0.2–2.81)
BACTERIA URNS QL MICRO: ABNORMAL /HPF
BASE EXCESS BLDA CALC-SCNC: -2.6 MMOL/L (ref -2–3)
BASOPHILS ABSOLUTE: 0.1 THOU/MM3 (ref 0–0.1)
BASOPHILS NFR BLD AUTO: 0.5 %
BILIRUB UR QL STRIP.AUTO: NEGATIVE
BUN SERPL-MCNC: 20 MG/DL (ref 7–22)
CALCIUM SERPL-MCNC: 8.5 MG/DL (ref 8.5–10.5)
CASTS #/AREA URNS LPF: ABNORMAL /LPF
CASTS 2: ABNORMAL /LPF
CHARACTER UR: CLEAR
CHLORIDE SERPL-SCNC: 96 MEQ/L (ref 98–111)
CO2 SERPL-SCNC: 21 MEQ/L (ref 23–33)
COLLECTED BY:: ABNORMAL
COLOR, UA: YELLOW
CREAT SERPL-MCNC: 0.9 MG/DL (ref 0.4–1.2)
CRYSTALS URNS MICRO: ABNORMAL
DEPRECATED RDW RBC AUTO: 47.8 FL (ref 35–45)
DEVICE: ABNORMAL
EOSINOPHIL NFR BLD AUTO: 1.8 %
EOSINOPHILS ABSOLUTE: 0.2 THOU/MM3 (ref 0–0.4)
EPITHELIAL CELLS, UA: ABNORMAL /HPF
ERYTHROCYTE [DISTWIDTH] IN BLOOD BY AUTOMATED COUNT: 15.9 % (ref 11.5–14.5)
FIO2 ON VENT O2 ANALYZER: 21 %
GFR SERPL CREATININE-BSD FRML MDRD: 67 ML/MIN/1.73M2
GLUCOSE BLD STRIP.AUTO-MCNC: 160 MG/DL (ref 70–108)
GLUCOSE BLD STRIP.AUTO-MCNC: 285 MG/DL (ref 70–108)
GLUCOSE SERPL-MCNC: 278 MG/DL (ref 70–108)
GLUCOSE UR QL STRIP.AUTO: NEGATIVE MG/DL
HCO3 BLDA-SCNC: 21 MMOL/L (ref 23–28)
HCT VFR BLD AUTO: 23.4 % (ref 37–47)
HGB BLD-MCNC: 7.2 GM/DL (ref 12–16)
HGB UR QL STRIP.AUTO: NEGATIVE
IMM GRANULOCYTES # BLD AUTO: 0.11 THOU/MM3 (ref 0–0.07)
IMM GRANULOCYTES NFR BLD AUTO: 0.8 %
KETONES UR QL STRIP.AUTO: NEGATIVE
LYMPHOCYTES ABSOLUTE: 2 THOU/MM3 (ref 1–4.8)
LYMPHOCYTES NFR BLD AUTO: 15.5 %
MCH RBC QN AUTO: 25.2 PG (ref 26–33)
MCHC RBC AUTO-ENTMCNC: 30.8 GM/DL (ref 32.2–35.5)
MCV RBC AUTO: 81.8 FL (ref 81–99)
MISCELLANEOUS 2: ABNORMAL
MONOCYTES ABSOLUTE: 1.1 THOU/MM3 (ref 0.4–1.3)
MONOCYTES NFR BLD AUTO: 8.6 %
NEUTROPHILS ABSOLUTE: 9.5 THOU/MM3 (ref 1.8–7.7)
NEUTROPHILS NFR BLD AUTO: 72.8 %
NITRITE UR QL STRIP: NEGATIVE
NRBC BLD AUTO-RTO: 0 /100 WBC
OSMOLALITY SERPL CALC.SUM OF ELEC: 275.2 MOSMOL/KG (ref 275–300)
PCO2 TEMP ADJ BLDMV: 28 MMHG (ref 41–51)
PH BLDMV: 7.47 [PH] (ref 7.31–7.41)
PH UR STRIP.AUTO: 5.5 [PH] (ref 5–9)
PLATELET # BLD AUTO: 494 THOU/MM3 (ref 130–400)
PMV BLD AUTO: 10.3 FL (ref 9.4–12.4)
PO2 BLDMV: 33 MMHG (ref 25–40)
POTASSIUM SERPL-SCNC: 3.4 MEQ/L (ref 3.5–5.2)
PROCALCITONIN SERPL IA-MCNC: 0.08 NG/ML (ref 0.01–0.09)
PROT UR STRIP.AUTO-MCNC: 100 MG/DL
RBC # BLD AUTO: 2.86 MILL/MM3 (ref 4.2–5.4)
RBC URINE: ABNORMAL /HPF
RENAL EPI CELLS #/AREA URNS HPF: ABNORMAL /[HPF]
SAO2 % BLDMV: 70 %
SITE: ABNORMAL
SODIUM SERPL-SCNC: 131 MEQ/L (ref 135–145)
SP GR UR REFRACT.AUTO: 1.02 (ref 1–1.03)
UROBILINOGEN, URINE: 0.2 EU/DL (ref 0–1)
VENTILATION MODE VENT: ABNORMAL
WBC # BLD AUTO: 13.1 THOU/MM3 (ref 4.8–10.8)
WBC #/AREA URNS HPF: ABNORMAL /HPF
WBC #/AREA URNS HPF: NEGATIVE /[HPF]
YEAST LIKE FUNGI URNS QL MICRO: ABNORMAL

## 2024-07-01 PROCEDURE — 99223 1ST HOSP IP/OBS HIGH 75: CPT | Performed by: INTERNAL MEDICINE

## 2024-07-01 PROCEDURE — 87070 CULTURE OTHR SPECIMN AEROBIC: CPT

## 2024-07-01 PROCEDURE — 99285 EMERGENCY DEPT VISIT HI MDM: CPT

## 2024-07-01 PROCEDURE — 36415 COLL VENOUS BLD VENIPUNCTURE: CPT

## 2024-07-01 PROCEDURE — 73630 X-RAY EXAM OF FOOT: CPT

## 2024-07-01 PROCEDURE — 82010 KETONE BODYS QUAN: CPT

## 2024-07-01 PROCEDURE — 96374 THER/PROPH/DIAG INJ IV PUSH: CPT

## 2024-07-01 PROCEDURE — 80048 BASIC METABOLIC PNL TOTAL CA: CPT

## 2024-07-01 PROCEDURE — 81001 URINALYSIS AUTO W/SCOPE: CPT

## 2024-07-01 PROCEDURE — 87186 SC STD MICRODIL/AGAR DIL: CPT

## 2024-07-01 PROCEDURE — 87077 CULTURE AEROBIC IDENTIFY: CPT

## 2024-07-01 PROCEDURE — 82948 REAGENT STRIP/BLOOD GLUCOSE: CPT

## 2024-07-01 PROCEDURE — 6360000002 HC RX W HCPCS: Performed by: STUDENT IN AN ORGANIZED HEALTH CARE EDUCATION/TRAINING PROGRAM

## 2024-07-01 PROCEDURE — 87205 SMEAR GRAM STAIN: CPT

## 2024-07-01 PROCEDURE — 84145 PROCALCITONIN (PCT): CPT

## 2024-07-01 PROCEDURE — 2580000003 HC RX 258: Performed by: STUDENT IN AN ORGANIZED HEALTH CARE EDUCATION/TRAINING PROGRAM

## 2024-07-01 PROCEDURE — 85025 COMPLETE CBC W/AUTO DIFF WBC: CPT

## 2024-07-01 PROCEDURE — 82803 BLOOD GASES ANY COMBINATION: CPT

## 2024-07-01 PROCEDURE — 87075 CULTR BACTERIA EXCEPT BLOOD: CPT

## 2024-07-01 PROCEDURE — 87040 BLOOD CULTURE FOR BACTERIA: CPT

## 2024-07-01 PROCEDURE — 1200000000 HC SEMI PRIVATE

## 2024-07-01 PROCEDURE — 6370000000 HC RX 637 (ALT 250 FOR IP)

## 2024-07-01 RX ORDER — CHOLECALCIFEROL (VITAMIN D3) 1250 MCG
CAPSULE ORAL
COMMUNITY

## 2024-07-01 RX ORDER — ACETAMINOPHEN 325 MG/1
650 TABLET ORAL EVERY 6 HOURS PRN
Status: DISCONTINUED | OUTPATIENT
Start: 2024-07-01 | End: 2024-07-17 | Stop reason: HOSPADM

## 2024-07-01 RX ORDER — LISINOPRIL 20 MG/1
20 TABLET ORAL DAILY
Status: ON HOLD | COMMUNITY
Start: 2024-04-15 | End: 2024-07-17 | Stop reason: HOSPADM

## 2024-07-01 RX ORDER — ONDANSETRON 4 MG/1
4 TABLET, ORALLY DISINTEGRATING ORAL EVERY 8 HOURS PRN
Status: DISCONTINUED | OUTPATIENT
Start: 2024-07-01 | End: 2024-07-17 | Stop reason: HOSPADM

## 2024-07-01 RX ORDER — ONDANSETRON 2 MG/ML
4 INJECTION INTRAMUSCULAR; INTRAVENOUS EVERY 6 HOURS PRN
Status: DISCONTINUED | OUTPATIENT
Start: 2024-07-01 | End: 2024-07-17 | Stop reason: HOSPADM

## 2024-07-01 RX ORDER — DEXTROSE MONOHYDRATE 100 MG/ML
INJECTION, SOLUTION INTRAVENOUS CONTINUOUS PRN
Status: DISCONTINUED | OUTPATIENT
Start: 2024-07-01 | End: 2024-07-17 | Stop reason: HOSPADM

## 2024-07-01 RX ORDER — ESCITALOPRAM OXALATE 10 MG/1
10 TABLET ORAL DAILY
Status: DISCONTINUED | OUTPATIENT
Start: 2024-07-02 | End: 2024-07-17 | Stop reason: HOSPADM

## 2024-07-01 RX ORDER — CIPROFLOXACIN 500 MG/1
500 TABLET, FILM COATED ORAL 2 TIMES DAILY
Status: ON HOLD | COMMUNITY
End: 2024-07-17 | Stop reason: HOSPADM

## 2024-07-01 RX ORDER — POLYETHYLENE GLYCOL 3350 17 G/17G
17 POWDER, FOR SOLUTION ORAL DAILY PRN
Status: DISCONTINUED | OUTPATIENT
Start: 2024-07-01 | End: 2024-07-17 | Stop reason: HOSPADM

## 2024-07-01 RX ORDER — ESCITALOPRAM OXALATE 10 MG/1
10 TABLET ORAL DAILY
COMMUNITY

## 2024-07-01 RX ORDER — SODIUM CHLORIDE 0.9 % (FLUSH) 0.9 %
5-40 SYRINGE (ML) INJECTION PRN
Status: DISCONTINUED | OUTPATIENT
Start: 2024-07-01 | End: 2024-07-17 | Stop reason: HOSPADM

## 2024-07-01 RX ORDER — SODIUM CHLORIDE 0.9 % (FLUSH) 0.9 %
5-40 SYRINGE (ML) INJECTION EVERY 12 HOURS SCHEDULED
Status: DISCONTINUED | OUTPATIENT
Start: 2024-07-01 | End: 2024-07-17 | Stop reason: HOSPADM

## 2024-07-01 RX ORDER — INSULIN LISPRO 100 [IU]/ML
0-4 INJECTION, SOLUTION INTRAVENOUS; SUBCUTANEOUS NIGHTLY
Status: DISCONTINUED | OUTPATIENT
Start: 2024-07-01 | End: 2024-07-06

## 2024-07-01 RX ORDER — MULTIVIT-MIN/IRON/FOLIC ACID/K 18-600-40
CAPSULE ORAL
COMMUNITY

## 2024-07-01 RX ORDER — ENOXAPARIN SODIUM 100 MG/ML
40 INJECTION SUBCUTANEOUS DAILY
Status: DISCONTINUED | OUTPATIENT
Start: 2024-07-01 | End: 2024-07-17 | Stop reason: HOSPADM

## 2024-07-01 RX ORDER — CEPHALEXIN 500 MG/1
500 CAPSULE ORAL 4 TIMES DAILY
Status: ON HOLD | COMMUNITY
End: 2024-07-17 | Stop reason: HOSPADM

## 2024-07-01 RX ORDER — ACETAMINOPHEN 650 MG/1
650 SUPPOSITORY RECTAL EVERY 6 HOURS PRN
Status: DISCONTINUED | OUTPATIENT
Start: 2024-07-01 | End: 2024-07-17 | Stop reason: HOSPADM

## 2024-07-01 RX ORDER — SODIUM CHLORIDE 9 MG/ML
INJECTION, SOLUTION INTRAVENOUS PRN
Status: DISCONTINUED | OUTPATIENT
Start: 2024-07-01 | End: 2024-07-17 | Stop reason: HOSPADM

## 2024-07-01 RX ORDER — LISINOPRIL 20 MG/1
20 TABLET ORAL DAILY
Status: DISCONTINUED | OUTPATIENT
Start: 2024-07-02 | End: 2024-07-05

## 2024-07-01 RX ORDER — POTASSIUM CHLORIDE 20 MEQ/1
40 TABLET, EXTENDED RELEASE ORAL PRN
Status: DISCONTINUED | OUTPATIENT
Start: 2024-07-01 | End: 2024-07-17 | Stop reason: HOSPADM

## 2024-07-01 RX ORDER — POTASSIUM CHLORIDE 7.45 MG/ML
10 INJECTION INTRAVENOUS PRN
Status: DISCONTINUED | OUTPATIENT
Start: 2024-07-01 | End: 2024-07-17 | Stop reason: HOSPADM

## 2024-07-01 RX ORDER — INSULIN LISPRO 100 [IU]/ML
0-8 INJECTION, SOLUTION INTRAVENOUS; SUBCUTANEOUS
Status: DISCONTINUED | OUTPATIENT
Start: 2024-07-01 | End: 2024-07-06

## 2024-07-01 RX ADMIN — ACETAMINOPHEN 650 MG: 325 TABLET ORAL at 21:49

## 2024-07-01 RX ADMIN — Medication 1250 MG: at 19:37

## 2024-07-01 RX ADMIN — PIPERACILLIN AND TAZOBACTAM 4500 MG: 4; .5 INJECTION, POWDER, LYOPHILIZED, FOR SOLUTION INTRAVENOUS at 17:13

## 2024-07-01 ASSESSMENT — PAIN SCALES - GENERAL: PAINLEVEL_OUTOF10: 3

## 2024-07-01 ASSESSMENT — PAIN - FUNCTIONAL ASSESSMENT
PAIN_FUNCTIONAL_ASSESSMENT: NONE - DENIES PAIN
PAIN_FUNCTIONAL_ASSESSMENT: NONE - DENIES PAIN

## 2024-07-01 ASSESSMENT — LIFESTYLE VARIABLES: HOW OFTEN DO YOU HAVE A DRINK CONTAINING ALCOHOL: NEVER

## 2024-07-01 NOTE — CONSULTS
[facility]  Podiatric Medicine and Surgery Consultation Note      Niya Blankenship  Medical record number:  [id number]  Age: 74 y.o.   Gender: female  : 1950  Episode date:  2024    Subjective      History of present illness    Patient is a 74 y.o. female with a history of diabetes mellitus and history of chronic right heel ulcer seen bedside today on behalf of Dr Phelan. Patient appeared pleasant, was oriented to person, place and time and in no acute distress. Patient states her daughter noticed her heel had turned a \"darker color\" and felt \"hollow\". Patient states she has been receiving wound care with Dr. Phelan in [place] and has been using b&w on the wound. However, she states felt like she was having chills yesterday and her daughters stated they felt her heel wound had gotten worse so she decided to come to the ED today to have it looked at. Patient denies any N/V/F/SOB or CP. No other pedal concerns.              Past medical history        Diagnosis Date    Diabetes mellitus (HCC)        Past surgical history    History reviewed. No pertinent surgical history.    Family history    History reviewed. No pertinent family history.    Social history    Social History     Tobacco Use    Smoking status: Never    Smokeless tobacco: Never   Substance Use Topics    Alcohol use: Yes     Comment: rarely    Drug use: Never       Allergies    No Known Allergies    Medications    No current facility-administered medications on file prior to encounter.     Current Outpatient Medications on File Prior to Encounter   Medication Sig Dispense Refill    lisinopril (PRINIVIL;ZESTRIL) 20 MG tablet Take 1 tablet by mouth daily      cephALEXin (KEFLEX) 500 MG capsule Take 1 capsule by mouth 4 times daily 14 days      Cholecalciferol (VITAMIN D) 50 MCG ( UT) CAPS capsule Take by mouth      Cholecalciferol (VITAMIN D3) 1.25 MG (12708 UT) CAPS Take by mouth      escitalopram (LEXAPRO) 10 MG tablet Take 1

## 2024-07-01 NOTE — ED PROVIDER NOTES
Transfer of Care Note:   Physician Signing out: Debbie  Receiving Physician: Dyllan Parmar DO  Sign out time: 1600    Brief history:  74-year-old female coming in with worsening right foot wound.  Following with wound care Dr. Phelan in [place].  Has began turning dark.  Hyperglycemic without evidence of DKA/HHS.  Family reports worsening confusion.  Subjective fever and chills.    Items pending that need to be checked:  Podiatry evaluation, possible IV antibiotics and admission      Tentative Impression of patient:  74-year-old female with suspected diabetic foot wound and necrosis that is worsening.    Expected disposition of patient:  Pending results, admitted.      Additional Assessment and results:   I have personally performed a face to face diagnostic evaluation on this patient. The patient's initial evaluation and plan have been discussed with the prior physician who initially evaluated the patient. Nursing Notes, Past Medical Hx, Past Surgical Hx, Social Hx, Allergies, vital signs and Family Hx were all reviewed.      Vitals:    07/01/24 1604   BP: (!) 146/57   Pulse: 96   Resp: 20   Temp:    SpO2: 96%     Physical Exam  Persistent and worsening right heel wound with exposed bone and soft tissue.  Patient afebrile without tachycardia.        Labs Reviewed   BASIC METABOLIC PANEL - Abnormal; Notable for the following components:       Result Value    Sodium 131 (*)     Potassium 3.4 (*)     Chloride 96 (*)     CO2 21 (*)     Glucose 278 (*)     All other components within normal limits   CBC WITH AUTO DIFFERENTIAL - Abnormal; Notable for the following components:    WBC 13.1 (*)     RBC 2.86 (*)     Hemoglobin 7.2 (*)     Hematocrit 23.4 (*)     MCH 25.2 (*)     MCHC 30.8 (*)     RDW-CV 15.9 (*)     RDW-SD 47.8 (*)     Platelets 494 (*)     Neutrophils Absolute 9.5 (*)     Immature Grans (Abs) 0.11 (*)     All other components within normal limits   BLOOD GAS, VENOUS - Abnormal; Notable for the  following components:    PH MIXED 7.47 (*)     PCO2, MIXED VENOUS 28 (*)     HCO3, Mixed 21 (*)     Base Exc, Mixed -2.6 (*)     All other components within normal limits   URINE WITH REFLEXED MICRO - Abnormal; Notable for the following components:    Protein,  (*)     All other components within normal limits   POCT GLUCOSE - Abnormal; Notable for the following components:    POC Glucose 285 (*)     All other components within normal limits   CULTURE, BLOOD 1    Narrative:     Epic Plan - [id number]   CULTURE, BLOOD 2    Narrative:     Epic Plan - [id number]   BETA-HYDROXYBUTYRATE   ANION GAP   GLOMERULAR FILTRATION RATE, ESTIMATED   OSMOLALITY   PROCALCITONIN         Medications   piperacillin-tazobactam (ZOSYN) 4,500 mg in sodium chloride 0.9 % 100 mL IVPB (mini-bag) (has no administration in time range)         XR FOOT RIGHT (MIN 3 VIEWS)   Final Result      No fracture or dislocation.            **This report has been created using voice recognition software. It may contain   minor errors which are inherent in voice recognition technology.**         Electronically signed by Dr. Yung Walker            ED Course as of 07/01/24 1700   Mon Jul 01, 2024   1600 XR FOOT RIGHT (MIN 3 VIEWS)  No osseous erosions [EM]   1631 Podiatry updated and sent [employer]. [EM]   1652 Consultation with the podiatric surgeon at bedside.  Decision made to initiate empiric antibiotics and admit to the hospitalist service based on the patient's comorbidities.  Vancomycin and Zosyn ordered. [EM]   1652 Hemoglobin Quant(!): 7.2  Normocytic anemia.  No history of blood loss. [EM]   1657 Hospitalist paged for admission [EM]      ED Course User Index  [EM] Dyllan Parmar, DO         Further MDM and disposition:   Assessment:   74-year-old female progressing foot wound.  Concern for exposed bone and tendon structures.  No tachycardia or fever right now.  Leukocytosis present.  Cultures drawn by previous physician.  Plan:

## 2024-07-01 NOTE — ED NOTES
Pt under multiple blankets. Skin hot to touch. Pt temp reassessed. Made aware we need to lose some of the extra blankets. Will notify provider.

## 2024-07-01 NOTE — ED NOTES
ED to inpatient nurses report      Chief Complaint:  Chief Complaint   Patient presents with    Foot Ulcer     Present to ED from: home    MOA:     LOC: alert and orientated to name and place  Mobility: Fully dependent  Oxygen Baseline: 96%    Current needs required: RA     Code Status:   No Order    What abnormal results were found and what did you give/do to treat them? Non healing ulcer of right foot, leukocytosis  Any procedures or intervention occur? Xray,labs, cultures    Mental Status:  Level of Consciousness: Alert (0)    Psych Assessment:        Vitals:  Patient Vitals for the past 24 hrs:   BP Temp Temp src Pulse Resp SpO2 Weight   07/01/24 1704 (!) 140/51 -- -- 94 20 97 % --   07/01/24 1604 (!) 146/57 -- -- 96 20 96 % --   07/01/24 1453 (!) 116/58 99.5 °F (37.5 °C) Axillary 96 20 97 % --   07/01/24 1336 -- -- -- -- -- 97 % --   07/01/24 1335 (!) 149/57 98.1 °F (36.7 °C) Oral 94 22 -- 59 kg (130 lb)        LDAs:   Peripheral IV 07/01/24 Right Antecubital (Active)       Ambulatory Status:  No data recorded    Diagnosis:  DISPOSITION Decision To Admit 07/01/2024 04:53:19 PM   Final diagnoses:   Non-healing open wound of heel, right, initial encounter   Leukocytosis, unspecified type   Anemia, unspecified type        Consults:  PHARMACY TO DOSE VANCOMYCIN  IP CONSULT TO PODIATRY     Pain Score:  Pain Assessment  Pain Assessment: None - Denies Pain    C-SSRS:   Risk of Suicide: No Risk    Sepsis Screening:       [place] Fall Risk:       Swallow Screening        Preferred Language:   English      ALLERGIES     Patient has no known allergies.    SURGICAL HISTORY     History reviewed. No pertinent surgical history.    PAST MEDICAL HISTORY       Past Medical History:   Diagnosis Date    Diabetes mellitus (HCC)            Electronically signed by Ellie Lobo RN on 7/1/2024 at 5:47 PM

## 2024-07-01 NOTE — H&P
HISTORY & PHYSICAL       Patient:  Niya Blankenship  YOB: 1950    MRN: [id number]     Acct: [id number]    PCP: Taylor Maza APRN - NP    Date of Admission: 7/1/2024    Date of Service: Pt seen/examined on 7/1/24 and Admitted to Inpatient with expected LOS greater than two midnights due to medical therapy.     ASSESSMENT + PLAN:    R Diabetic Foot Ulcer  - Pt presented to the ED 7/1 with worsening R heel wound since January 2024.    - XR R foot 7/1: negative for osseous erosions and soft tissue emphysema . Soft tissue defect noted to plantar aspect of foot on lateral view.   - Podiatry consulted, appreciate recommendations: Wound culture collected and dressing placed. MRI right foot ordered.  - Vanc and zosyn IV x7 days, began on 7/1. pharmacy to dose vanc    Acute-on-chronic encephalopathy, likely secondary to foot wound  - Per daughter, pt has baseline confusion. On exam, she is oriented to person and place.   - Believe worsening confusion is likely secondary to her foot wound. Will continue to evaluate and monitor mentation.     Leukocytosis, secondary to foot wound  - On admission 7/1, her WBC count is elevated at 13.1 afebrile. Trend WBC daily.   - See abx regimen as above     Diabetic peripheral neuropathy  - Pt reports severe bilateral LE peripheral neuropathy  - Manage glucose to prevent worsening neuropathy   - Continue to monitor for symptoms     Hyponatremia, secondary to hyperglycemia   - On admission 7/1, sodium was 131. The corrected hyponatremia per poc glucose is 134  - Monitor with daily BMP    Hypokalemia   - On admission 7/1, potassium   - K replacement protocol in place    Hyperglycemia in the setting of non-insulin dependent DM2  - Her A1c on 7/1 is 12.4, poorly controlled  - Home meds metformin and glimepiride held   - Moderate dose insulin sliding scale started 7/1  - Hypoglycemia protocol in place     Microcytic Anemia  - Given Hb 7.2, Hct 23.4 on 7/1, baseline Hb averaged  the morning. Her daughter was present on 7/1 to assist with history.    Past Medical History:          Diagnosis Date    Diabetes mellitus (HCC)        Past Surgical History:      History reviewed. No pertinent surgical history.    Medications Prior to Admission:      Prior to Admission medications    Medication Sig Start Date End Date Taking? Authorizing Provider   lisinopril (PRINIVIL;ZESTRIL) 20 MG tablet Take 1 tablet by mouth daily 4/15/24 10/12/24 Yes Juliette Velasquez MD   cephALEXin (KEFLEX) 500 MG capsule Take 1 capsule by mouth 4 times daily 14 days   Yes Juliette Velasquez MD   Cholecalciferol (VITAMIN D) 50 MCG (2000 UT) CAPS capsule Take by mouth   Yes Juliette Velasquez MD   Cholecalciferol (VITAMIN D3) 1.25 MG (96007 UT) CAPS Take by mouth   Yes Juliette Velasquez MD   escitalopram (LEXAPRO) 10 MG tablet Take 1 tablet by mouth daily   Yes Juliette Velasquez MD   ciprofloxacin (CIPRO) 500 MG tablet Take 1 tablet by mouth 2 times daily 14 days   Yes Juliette Velasquez MD   glimepiride (AMARYL) 1 MG tablet Take 1 tablet by mouth every morning (before breakfast)    Juliette Velasquez MD   metFORMIN (GLUCOPHAGE) 1000 MG tablet Take 1,000 mg by mouth 2 times daily (with meals)    Juliette Velasquez MD       Allergies:  Patient has no known allergies.    Social History:      The patient currently lives at home.     TOBACCO:   reports that she has never smoked. She has never used smokeless tobacco.  ETOH:   reports current alcohol use.      Family History:       Reviewed in detail and negative for DM, CAD, Cancer, CVA. Positive as follows:    History reviewed. No pertinent family history.    Diet:  ADULT DIET; Regular; 4 carb choices (60 gm/meal)    Review of Systems   Constitutional:  Positive for fatigue. Negative for appetite change.   Respiratory:  Negative for chest tightness, shortness of breath, wheezing and stridor.    Cardiovascular:  Negative for chest pain and palpitations.

## 2024-07-01 NOTE — ED NOTES
Presents to ER with family with concern of wound to right foot. Family reports she has been following with Dr. Phelan in [place] for wound care. Reports wound has been on foot since December but last night started to turn \"dark\". Heel necrotic. Multiple wounds noted on food in various  stages.  The largest wound has exposed bone and ligaments. Pt arrived with dressing in place.  upon arrival. Family expressed concerns with increased confusion.Daughter reports possible amputation of right foot per Dr. Phelan. Family reports pt was chilled yesterday in the heat. This RN to monitor

## 2024-07-02 ENCOUNTER — APPOINTMENT (OUTPATIENT)
Dept: INTERVENTIONAL RADIOLOGY/VASCULAR | Age: 74
DRG: 622 | End: 2024-07-02
Payer: COMMERCIAL

## 2024-07-02 ENCOUNTER — APPOINTMENT (OUTPATIENT)
Dept: MRI IMAGING | Age: 74
DRG: 622 | End: 2024-07-02
Payer: COMMERCIAL

## 2024-07-02 PROBLEM — S91.309A NON-HEALING OPEN WOUND OF HEEL: Status: ACTIVE | Noted: 2024-07-02

## 2024-07-02 PROBLEM — M86.9 OSTEOMYELITIS OF RIGHT FOOT (HCC): Status: ACTIVE | Noted: 2024-07-02

## 2024-07-02 PROBLEM — L97.419 DIABETIC ULCER OF RIGHT HEEL ASSOCIATED WITH TYPE 2 DIABETES MELLITUS (HCC): Status: ACTIVE | Noted: 2024-07-01

## 2024-07-02 LAB
ABO: NORMAL
ALBUMIN SERPL BCG-MCNC: 2.8 G/DL (ref 3.5–5.1)
ANION GAP SERPL CALC-SCNC: 13 MEQ/L (ref 8–16)
ANTIBODY SCREEN: NORMAL
AUTO DIFF PNL BLD: ABNORMAL
BASOPHILS ABSOLUTE: 0.1 THOU/MM3 (ref 0–0.1)
BASOPHILS NFR BLD AUTO: 0.5 %
BUN SERPL-MCNC: 16 MG/DL (ref 7–22)
CALCIUM SERPL-MCNC: 8.3 MG/DL (ref 8.5–10.5)
CHLORIDE SERPL-SCNC: 102 MEQ/L (ref 98–111)
CO2 SERPL-SCNC: 21 MEQ/L (ref 23–33)
CREAT SERPL-MCNC: 0.9 MG/DL (ref 0.4–1.2)
CRP SERPL-MCNC: 15.8 MG/DL (ref 0–1)
DEPRECATED RDW RBC AUTO: 47.3 FL (ref 35–45)
EOSINOPHIL NFR BLD AUTO: 4 %
EOSINOPHILS ABSOLUTE: 0.5 THOU/MM3 (ref 0–0.4)
ERYTHROCYTE [DISTWIDTH] IN BLOOD BY AUTOMATED COUNT: 15.9 % (ref 11.5–14.5)
ERYTHROCYTE [SEDIMENTATION RATE] IN BLOOD BY WESTERGREN METHOD: 145 MM/HR (ref 0–20)
FERRITIN SERPL IA-MCNC: 903 NG/ML (ref 10–291)
FOLATE SERPL-MCNC: 13.6 NG/ML (ref 4.8–24.2)
GFR SERPL CREATININE-BSD FRML MDRD: 67 ML/MIN/1.73M2
GLUCOSE BLD STRIP.AUTO-MCNC: 173 MG/DL (ref 70–108)
GLUCOSE BLD STRIP.AUTO-MCNC: 188 MG/DL (ref 70–108)
GLUCOSE BLD STRIP.AUTO-MCNC: 255 MG/DL (ref 70–108)
GLUCOSE BLD STRIP.AUTO-MCNC: 278 MG/DL (ref 70–108)
GLUCOSE SERPL-MCNC: 144 MG/DL (ref 70–108)
HCT VFR BLD AUTO: 21.1 % (ref 37–47)
HCT VFR BLD AUTO: 21.7 % (ref 37–47)
HCT VFR BLD AUTO: 23.2 % (ref 37–47)
HGB BLD-MCNC: 6.4 GM/DL (ref 12–16)
HGB BLD-MCNC: 6.7 GM/DL (ref 12–16)
HGB BLD-MCNC: 7.2 GM/DL (ref 12–16)
HGB RETIC QN AUTO: 24 PG (ref 28.2–35.7)
HYPOCHROMIA BLD QL SMEAR: PRESENT
IMM GRANULOCYTES # BLD AUTO: 0.11 THOU/MM3 (ref 0–0.07)
IMM GRANULOCYTES NFR BLD AUTO: 0.9 %
IMM RETICS NFR: 17.1 % (ref 3–15.9)
IRON SATN MFR SERPL: 9 % (ref 20–50)
IRON SERPL-MCNC: 12 UG/DL (ref 50–170)
LYMPHOCYTES ABSOLUTE: 1.7 THOU/MM3 (ref 1–4.8)
LYMPHOCYTES NFR BLD AUTO: 14 %
MAGNESIUM SERPL-MCNC: 1.4 MG/DL (ref 1.6–2.4)
MCH RBC QN AUTO: 25.1 PG (ref 26–33)
MCHC RBC AUTO-ENTMCNC: 30.9 GM/DL (ref 32.2–35.5)
MCV RBC AUTO: 81.3 FL (ref 81–99)
MONOCYTES ABSOLUTE: 1.1 THOU/MM3 (ref 0.4–1.3)
MONOCYTES NFR BLD AUTO: 9 %
NEUTROPHILS ABSOLUTE: 8.7 THOU/MM3 (ref 1.8–7.7)
NEUTROPHILS NFR BLD AUTO: 71.6 %
NRBC BLD AUTO-RTO: 0 /100 WBC
OSMOLALITY SERPL CALC.SUM OF ELEC: 275.7 MOSMOL/KG (ref 275–300)
PATHOLOGIST REVIEW: ABNORMAL
PLATELET # BLD AUTO: 436 THOU/MM3 (ref 130–400)
PMV BLD AUTO: 9.8 FL (ref 9.4–12.4)
POTASSIUM SERPL-SCNC: 3.1 MEQ/L (ref 3.5–5.2)
PREALB SERPL-MCNC: 9.5 MG/DL (ref 20–40)
PROT SERPL-MCNC: 6.7 G/DL (ref 6.1–8)
RBC # BLD AUTO: 2.67 MILL/MM3 (ref 4.2–5.4)
RETICS # AUTO: 41 THOU/MM3 (ref 20–115)
RETICS/RBC NFR AUTO: 1.6 % (ref 0.5–2)
RH FACTOR: NORMAL
SCAN OF BLOOD SMEAR: NORMAL
SODIUM SERPL-SCNC: 136 MEQ/L (ref 135–145)
TIBC SERPL-MCNC: 127 UG/DL (ref 171–450)
TOXIC GRANULES BLD QL SMEAR: PRESENT
VIT B12 SERPL-MCNC: 625 PG/ML (ref 211–911)
WBC # BLD AUTO: 12.1 THOU/MM3 (ref 4.8–10.8)

## 2024-07-02 PROCEDURE — 6360000002 HC RX W HCPCS

## 2024-07-02 PROCEDURE — 6370000000 HC RX 637 (ALT 250 FOR IP)

## 2024-07-02 PROCEDURE — 36415 COLL VENOUS BLD VENIPUNCTURE: CPT

## 2024-07-02 PROCEDURE — 86923 COMPATIBILITY TEST ELECTRIC: CPT

## 2024-07-02 PROCEDURE — 82040 ASSAY OF SERUM ALBUMIN: CPT

## 2024-07-02 PROCEDURE — 84155 ASSAY OF PROTEIN SERUM: CPT

## 2024-07-02 PROCEDURE — 86140 C-REACTIVE PROTEIN: CPT

## 2024-07-02 PROCEDURE — 82728 ASSAY OF FERRITIN: CPT

## 2024-07-02 PROCEDURE — 73718 MRI LOWER EXTREMITY W/O DYE: CPT

## 2024-07-02 PROCEDURE — 85014 HEMATOCRIT: CPT

## 2024-07-02 PROCEDURE — 86901 BLOOD TYPING SEROLOGIC RH(D): CPT

## 2024-07-02 PROCEDURE — 86900 BLOOD TYPING SEROLOGIC ABO: CPT

## 2024-07-02 PROCEDURE — 83540 ASSAY OF IRON: CPT

## 2024-07-02 PROCEDURE — P9016 RBC LEUKOCYTES REDUCED: HCPCS

## 2024-07-02 PROCEDURE — 83735 ASSAY OF MAGNESIUM: CPT

## 2024-07-02 PROCEDURE — 85025 COMPLETE CBC W/AUTO DIFF WBC: CPT

## 2024-07-02 PROCEDURE — 82948 REAGENT STRIP/BLOOD GLUCOSE: CPT

## 2024-07-02 PROCEDURE — 85651 RBC SED RATE NONAUTOMATED: CPT

## 2024-07-02 PROCEDURE — 1200000000 HC SEMI PRIVATE

## 2024-07-02 PROCEDURE — 84134 ASSAY OF PREALBUMIN: CPT

## 2024-07-02 PROCEDURE — 86850 RBC ANTIBODY SCREEN: CPT

## 2024-07-02 PROCEDURE — 83550 IRON BINDING TEST: CPT

## 2024-07-02 PROCEDURE — 85046 RETICYTE/HGB CONCENTRATE: CPT

## 2024-07-02 PROCEDURE — 85018 HEMOGLOBIN: CPT

## 2024-07-02 PROCEDURE — 99232 SBSQ HOSP IP/OBS MODERATE 35: CPT | Performed by: INTERNAL MEDICINE

## 2024-07-02 PROCEDURE — 80048 BASIC METABOLIC PNL TOTAL CA: CPT

## 2024-07-02 PROCEDURE — 93925 LOWER EXTREMITY STUDY: CPT

## 2024-07-02 PROCEDURE — 6370000000 HC RX 637 (ALT 250 FOR IP): Performed by: INTERNAL MEDICINE

## 2024-07-02 PROCEDURE — 2580000003 HC RX 258

## 2024-07-02 PROCEDURE — 6360000002 HC RX W HCPCS: Performed by: INTERNAL MEDICINE

## 2024-07-02 PROCEDURE — 82746 ASSAY OF FOLIC ACID SERUM: CPT

## 2024-07-02 PROCEDURE — 1200000003 HC TELEMETRY R&B

## 2024-07-02 PROCEDURE — 82607 VITAMIN B-12: CPT

## 2024-07-02 RX ORDER — OXYCODONE HYDROCHLORIDE 5 MG/1
5 TABLET ORAL ONCE
Status: COMPLETED | OUTPATIENT
Start: 2024-07-02 | End: 2024-07-02

## 2024-07-02 RX ORDER — MAGNESIUM SULFATE IN WATER 40 MG/ML
4000 INJECTION, SOLUTION INTRAVENOUS ONCE
Status: COMPLETED | OUTPATIENT
Start: 2024-07-02 | End: 2024-07-02

## 2024-07-02 RX ADMIN — PIPERACILLIN SODIUM AND TAZOBACTAM SODIUM 3375 MG: 3; .375 INJECTION, POWDER, LYOPHILIZED, FOR SOLUTION INTRAVENOUS at 16:51

## 2024-07-02 RX ADMIN — SODIUM CHLORIDE, PRESERVATIVE FREE 10 ML: 5 INJECTION INTRAVENOUS at 08:50

## 2024-07-02 RX ADMIN — VANCOMYCIN HYDROCHLORIDE 500 MG: 500 INJECTION, POWDER, LYOPHILIZED, FOR SOLUTION INTRAVENOUS at 08:56

## 2024-07-02 RX ADMIN — ESCITALOPRAM OXALATE 10 MG: 10 TABLET, FILM COATED ORAL at 08:47

## 2024-07-02 RX ADMIN — MAGNESIUM SULFATE HEPTAHYDRATE 4000 MG: 40 INJECTION, SOLUTION INTRAVENOUS at 16:49

## 2024-07-02 RX ADMIN — POTASSIUM BICARBONATE 40 MEQ: 782 TABLET, EFFERVESCENT ORAL at 09:09

## 2024-07-02 RX ADMIN — ACETAMINOPHEN 650 MG: 325 TABLET ORAL at 12:44

## 2024-07-02 RX ADMIN — INSULIN LISPRO 4 UNITS: 100 INJECTION, SOLUTION INTRAVENOUS; SUBCUTANEOUS at 12:42

## 2024-07-02 RX ADMIN — PIPERACILLIN SODIUM AND TAZOBACTAM SODIUM 3375 MG: 3; .375 INJECTION, POWDER, LYOPHILIZED, FOR SOLUTION INTRAVENOUS at 09:01

## 2024-07-02 RX ADMIN — OXYCODONE HYDROCHLORIDE 5 MG: 5 TABLET ORAL at 14:07

## 2024-07-02 RX ADMIN — PIPERACILLIN SODIUM AND TAZOBACTAM SODIUM 3375 MG: 3; .375 INJECTION, POWDER, LYOPHILIZED, FOR SOLUTION INTRAVENOUS at 00:27

## 2024-07-02 RX ADMIN — LISINOPRIL 20 MG: 20 TABLET ORAL at 08:47

## 2024-07-02 RX ADMIN — VANCOMYCIN HYDROCHLORIDE 500 MG: 500 INJECTION, POWDER, LYOPHILIZED, FOR SOLUTION INTRAVENOUS at 21:48

## 2024-07-02 ASSESSMENT — PAIN SCALES - GENERAL
PAINLEVEL_OUTOF10: 5
PAINLEVEL_OUTOF10: 0
PAINLEVEL_OUTOF10: 6
PAINLEVEL_OUTOF10: 4
PAINLEVEL_OUTOF10: 0
PAINLEVEL_OUTOF10: 0

## 2024-07-02 ASSESSMENT — PAIN - FUNCTIONAL ASSESSMENT: PAIN_FUNCTIONAL_ASSESSMENT: PREVENTS OR INTERFERES SOME ACTIVE ACTIVITIES AND ADLS

## 2024-07-02 ASSESSMENT — PAIN DESCRIPTION - DESCRIPTORS
DESCRIPTORS: ACHING;THROBBING
DESCRIPTORS: ACHING;DISCOMFORT

## 2024-07-02 ASSESSMENT — PAIN DESCRIPTION - ORIENTATION
ORIENTATION: RIGHT
ORIENTATION: RIGHT

## 2024-07-02 ASSESSMENT — PAIN DESCRIPTION - LOCATION
LOCATION: FOOT
LOCATION: FOOT

## 2024-07-02 NOTE — CARE COORDINATION
Case Management Assessment Initial Evaluation    Date/Time of Evaluation: 2024 7:32 AM  Assessment Completed by: Brigitte Walker RN    If patient is discharged prior to next notation, then this note serves as note for discharge by case management.    Patient Name: Niya Blankenship                   YOB: 1950  Diagnosis: Type 2 diabetes mellitus with right diabetic foot ulcer (HCC) [E11.621, L97.519]  Anemia, unspecified type [D64.9]  Leukocytosis, unspecified type [D72.829]  Non-healing open wound of heel, right, initial encounter [S91.301A]                   Date / Time: 2024  1:19 PM  Location: [address] [place] [address]     Patient Admission Status: Inpatient   Readmission Risk Low 0-14, Mod 15-19), High > 20: Readmission Risk Score: 13.7    Current PCP: Taylor Maza APRN - NP    Additional Case Management Notes: To ED w/ gradually worsening right heel ulcer. She had a low-grade fever of 99.5F and notes \"darkening\" of the foot with a foul smell. Hospitalist following. Podiatry to see. Bedrest. Carb Control diet. Occult stool pending. DM management. IV zosyn q8h. IV vancomycin q12h. H&H q12h. Hgb 6.7 this AM, repeat Hgb 7.2. K 3.1-lyte replacement protocol. Mg 1.4. WBC 12.1 (13.1)    Procedures: none    Imagin/1 XR Right Foot: No fracture or dislocation   MRI Right Foot:  Soft tissue defect in the plantar soft tissues over the calcaneus. 2. Abnormal signal intensity in the calcaneus consistent with involvement by osteomyelitis. 3. Abnormal signal intensity in the plantar soft tissues over the medial aspect of the calcaneus consistent with inflammatory process. There was air in the soft tissues seen on the plain radiographs. 4. Degenerative changes. 5. Osteochondral defect in the talar dome. 6. Spurring at the attachment of the Achilles tendon upon the calcaneus. 7. Soft tissue swelling over the foot.     Patient Goals/Plan/Treatment Preferences: Met w/ Niya's family at bedside. Niya was asleep and her

## 2024-07-02 NOTE — PROGRESS NOTES
[facility]   Pharmacy Pharmacokinetic Monitoring Service - Vancomycin     Niya Blankenship is a 74 y.o. female starting on vancomycin therapy for SSTI. Pharmacy consulted by Dr. Samreen Li for monitoring and adjustment.    Target Concentration: Goal AUC/GIULIA 400-600 mg*hr/L    Additional Antimicrobials: Zosyn    Pertinent Laboratory Values:   Wt Readings from Last 1 Encounters:   07/01/24 59 kg (130 lb)     Temp Readings from Last 1 Encounters:   07/01/24 99.5 °F (37.5 °C) (Axillary)     Estimated Creatinine Clearance: 47 mL/min (based on SCr of 0.9 mg/dL).  Recent Labs     07/01/24  1350   CREATININE 0.9   BUN 20   WBC 13.1*     Procalcitonin: 0.08    Pertinent Cultures:  Culture Date Source Results   7/1/24 Blood x2 Sent   MRSA Nasal Swab: not ordered. Order placed by pharmacy.    Plan:  Dosing recommendations based on Bayesian software  Start vancomycin 500 mg every 12 hours  Anticipated AUC of 451 and trough concentration of 14.2 at steady state  Renal labs as indicated   Pharmacy will continue to monitor patient and adjust therapy as indicated    Thank you for the consult,  Willy Serrano [facility]  7/1/2024 8:03 PM

## 2024-07-02 NOTE — PLAN OF CARE
Problem: Discharge Planning  Goal: Discharge to home or other facility with appropriate resources  Outcome: Progressing  Flowsheets (Taken 7/2/2024 1848)  Discharge to home or other facility with appropriate resources:   Identify barriers to discharge with patient and caregiver   Arrange for needed discharge resources and transportation as appropriate   Identify discharge learning needs (meds, wound care, etc)     Problem: Safety - Adult  Goal: Free from fall injury  Outcome: Progressing  Flowsheets (Taken 7/2/2024 1848)  Free From Fall Injury: Instruct family/caregiver on patient safety     Problem: Skin/Tissue Integrity  Goal: Absence of new skin breakdown  Description: 1.  Monitor for areas of redness and/or skin breakdown  2.  Assess vascular access sites hourly  3.  Every 4-6 hours minimum:  Change oxygen saturation probe site  4.  Every 4-6 hours:  If on nasal continuous positive airway pressure, respiratory therapy assess nares and determine need for appliance change or resting period.  Outcome: Progressing  Note: No new skin breakdown noted at this time this shift.     Problem: Pain  Goal: Verbalizes/displays adequate comfort level or baseline comfort level  Outcome: Progressing  Flowsheets (Taken 7/2/2024 1848)  Verbalizes/displays adequate comfort level or baseline comfort level:   Encourage patient to monitor pain and request assistance   Assess pain using appropriate pain scale   Administer analgesics based on type and severity of pain and evaluate response   Implement non-pharmacological measures as appropriate and evaluate response     Problem: Skin/Tissue Integrity - Adult  Goal: Skin integrity remains intact  Outcome: Progressing  Flowsheets (Taken 7/2/2024 1848)  Skin Integrity Remains Intact: Monitor for areas of redness and/or skin breakdown     Problem: Skin/Tissue Integrity - Adult  Goal: Incisions, wounds, or drain sites healing without S/S of infection  Outcome: Progressing  Flowsheets

## 2024-07-02 NOTE — PROGRESS NOTES
Podiatric Progress Note  Niya Blankenship  Subjective :   7/2/24  Patient was seen bedside today alongside Dr. Phelan.  Patient relates minimal pain to the right lower extremity.  States that she overall feels well however is tired and would like to sleep.  Family at bedside appreciative the visit and wishes to discuss prognosis of wound and care from this point.  Patient denies any N/V/F/C/SOB or CP. No other pedal complaints.    HPI  Patient is a 74 y.o. female with a history of diabetes mellitus and history of chronic right heel ulcer seen bedside today on behalf of Dr Phelan. Patient appeared pleasant, was oriented to person, place and time and in no acute distress. Patient states her daughter noticed her heel had turned a \"darker color\" and felt \"hollow\". Patient states she has been receiving wound care with Dr. Phelan in [place] and has been using b&w on the wound. However, she states felt like she was having chills yesterday and her daughters stated they felt her heel wound had gotten worse so she decided to come to the ED today to have it looked at. Patient denies any N/V/F/SOB or CP. No other pedal concerns.      Current Medications:    Current Facility-Administered Medications: magnesium sulfate 4000 mg in 100 mL IVPB premix, 4,000 mg, IntraVENous, Once  sodium chloride flush 0.9 % injection 5-40 mL, 5-40 mL, IntraVENous, 2 times per day  sodium chloride flush 0.9 % injection 5-40 mL, 5-40 mL, IntraVENous, PRN  0.9 % sodium chloride infusion, , IntraVENous, PRN  potassium chloride (KLOR-CON M) extended release tablet 40 mEq, 40 mEq, Oral, PRN **OR** potassium bicarb-citric acid (EFFER-K) effervescent tablet 40 mEq, 40 mEq, Oral, PRN **OR** potassium chloride 10 mEq/100 mL IVPB (Peripheral Line), 10 mEq, IntraVENous, PRN  [Held by provider] enoxaparin (LOVENOX) injection 40 mg, 40 mg, SubCUTAneous, Daily  ondansetron (ZOFRAN-ODT) disintegrating tablet 4 mg, 4 mg, Oral, Q8H PRN **OR** ondansetron (ZOFRAN) injection  the last 72 hours.      Exam:   RLE dressing intact and well maintained. No apparent strike through noted.     Vascular: Dorsalis pedis and posterior tibial pulses are dopplerable bilaterally. Skin temperature is warm to warm from proximal tibial tuberosity to distal digits. CFT brisk to exposed digits. Edema absent. Hair growth absent.     Dermatologic: Nails 1-5 bilaterally are normal. Webspaces 1-4 bilaterally are clean, dry and intact. Wound to right heel has a hyperkeratotic and erythematous periwound, 30/70 necrotic to fibrotic base, wound edges are irregular and erythematous, no evidence of undermining but wound tracks posterior medially towards a previous hemorrhagic blister on the medial plantar foot , purulent drainage noted upon probing into the plantar vault along the plantar fascial bands.  Positive probe to bone. No malodor noted.  Mild crepitus noted plantarly and along hemorrhagic blister with expression of purulent drainage.     Neurovascular: Light touch and gross sensation diminished     Musculoskeletal: Muscle strength and ROM testing deferred due to patient's pain. Pain with palpation of right heel. Foot and ankle in grossly rectus alignment.              ASSESSMENT  Principle  1.  Chronic right heel ulcer, bone exposure  2.  Diabetes with peripheral neuropathy    Chronic  Patient Active Problem List   Diagnosis    Diabetic ulcer of right heel associated with type 2 diabetes mellitus (HCC)    Non-healing open wound of heel    Osteomyelitis of right foot (HCC)       PLAN:   - Pt. is a 74 y.o. female   - Patient was examined and evaluated  - WBC 12.1, afebrile  - Labs reviewed, H&H 7.2/23.2; patient did receive blood transfusion.  - , CRP 15.8, albumin 2.8, prealbumin 9.5, total protein 6.7  -Arterial Dopplers ordered, reviewed: \"Excellent pulsatility seen throughout both legs...there is no evidence to suggest significant arterial stenosis.\"  - X-rays and MRI reviewed, noted involvement of

## 2024-07-02 NOTE — ED PROVIDER NOTES
[facility]      CHIEF COMPLAINT       Chief Complaint   Patient presents with    Foot Ulcer       Nurses Notes reviewed and I agree except as noted in the HPI.      HISTORY OF PRESENT ILLNESS    Niya Blankenship is a 74 y.o. female who presents with complaint of nonhealing wound to right heel, sees Dr. Phelan at the wound clinic.  Patient has history of diabetes, appears to be poorly controlled.  It appears that she was also on antibiotics, apparently was put on Cipro sometime in the past, per pill count it appears that she has not been fully compliant with the regimen.  She has history of dementia at baseline, family reports worsening confusion recently.  Onset: Chronic  Duration: Right heel  Timing: Severe soft tissue erosion  Location of Pain: No pain  Intesity/severity: Severe right heel wound with poorly controlled diabetes  Modifying Factors: Poorly controlled diabetes  Relieved by;  Previous Episodes;  Tx Before arrival: None    PAST MEDICAL HISTORY    has a past medical history of Diabetes mellitus (HCC).    SURGICAL HISTORY      has no past surgical history on file.    CURRENT MEDICATIONS       Current Discharge Medication List        CONTINUE these medications which have NOT CHANGED    Details   lisinopril (PRINIVIL;ZESTRIL) 20 MG tablet Take 1 tablet by mouth daily      cephALEXin (KEFLEX) 500 MG capsule Take 1 capsule by mouth 4 times daily 14 days      !! Cholecalciferol (VITAMIN D) 50 MCG (2000 UT) CAPS capsule Take by mouth      !! Cholecalciferol (VITAMIN D3) 1.25 MG (51411 UT) CAPS Take by mouth      escitalopram (LEXAPRO) 10 MG tablet Take 1 tablet by mouth daily      ciprofloxacin (CIPRO) 500 MG tablet Take 1 tablet by mouth 2 times daily 14 days      glimepiride (AMARYL) 1 MG tablet Take 1 tablet by mouth every morning (before breakfast)      metFORMIN (GLUCOPHAGE) 1000 MG tablet Take 1 tablet by mouth 2 times daily (with meals)       !! - Potential duplicate medications found. Please

## 2024-07-02 NOTE — PROGRESS NOTES
PROGRESS NOTE      Patient:  Niya Blankenship  Unit/Bed:7K-12/012-A  YOB: 1950  MRN: [id number]   Acct: [id number]    PCP: Taylor Maza APRN - NP    Date of Admission: 7/1/2024 LOS: 1    Date of Evaluation:  7/2/2024    Anticipated Discharge: pending clinical course    Assessment/Plan:    Osteomyelitis of right foot seen on imaging with leukocytosis  MRI right foot 7/2: Soft tissue defect in the plantar soft tissues over the calcaneus. Abnormal signal intensity in the calcaneus consistent with involvement by osteomyelitis. Abnormal signal intensity in the plantar soft tissues over the medial aspect   of the calcaneus consistent with inflammatory process. There was air in the soft tissues seen on the plain radiographs. Degenerative changes. Osteochondral defect in the talar dome. Spurring at the attachment of the Achilles tendon upon the calcaneus. Soft tissue swelling over the foot.   X-ray right foot 7/1: No fracture or dislocation   Podiatry following, continue to appreciate recommendations  Continue vancomycin (7/1-7/7)  Continue Zosyn (7/1-7/7)  Follow-up blood culture x 2  Urine culture: Many gram negative bacilli. Many gram positive bacilli. Moderate gram positive cocci occurring singly and in pairs   WBC 12.1 this a.m. compared to 13.1 on admission  CRP 15.8  Iron deficiency anemia  Morning of 7/2 hemoglobin dropped from 7.2 to 6.7.    Repeat H&H showed 7.2/23.2, stable  Ferritin 903, iron 12, TIBC 127, % iron saturation 9, folate 13.6, vitamin B12 625  Follow-up fecal occult  Continue holding Lovenox  Transfusion consent obtained  Continue H&H BID  Will transfusion when Hgb <7 or signs of overt bleeding  Type II diabetes, uncontrolled   7/30/22: A1c 12.4  Will get repeat A1c tomorrow am     Beta-hydroxybutyrate 1.56  Home meds include: Metformin 1000 mg twice daily, Amaryl  Will continue medium dose ISS   Acute on chronic encephalopathy, improving  Likely secondary to uncontrolled diabetes vs

## 2024-07-02 NOTE — PLAN OF CARE
Problem: Discharge Planning  Goal: Discharge to home or other facility with appropriate resources  Outcome: Progressing  Flowsheets (Taken 7/1/2024 2029 by Leticia Delgado RN)  Discharge to home or other facility with appropriate resources:   Identify discharge learning needs (meds, wound care, etc)   Identify barriers to discharge with patient and caregiver   Refer to discharge planning if patient needs post-hospital services based on physician order or complex needs related to functional status, cognitive ability or social support system     Problem: Safety - Adult  Goal: Free from fall injury  Outcome: Progressing     Problem: Skin/Tissue Integrity  Goal: Absence of new skin breakdown  Description: 1.  Monitor for areas of redness and/or skin breakdown  2.  Assess vascular access sites hourly  3.  Every 4-6 hours minimum:  Change oxygen saturation probe site  4.  Every 4-6 hours:  If on nasal continuous positive airway pressure, respiratory therapy assess nares and determine need for appliance change or resting period.  Outcome: Progressing     Problem: Pain  Goal: Verbalizes/displays adequate comfort level or baseline comfort level  Outcome: Progressing     Problem: Skin/Tissue Integrity - Adult  Goal: Skin integrity remains intact  Outcome: Progressing  Goal: Incisions, wounds, or drain sites healing without S/S of infection  Outcome: Progressing

## 2024-07-02 NOTE — CONSENT
Informed Consent for Blood Component Transfusion Note    I have discussed with the patient and daughter in law  the rationale for blood component transfusion; its benefits in treating or preventing fatigue, organ damage, or death; and its risk which includes mild transfusion reactions, rare risk of blood borne infection, or more serious but rare reactions. I have discussed the alternatives to transfusion, including the risk and consequences of not receiving transfusion. The patient and daughter in law  had an opportunity to ask questions and had agreed to proceed with transfusion of blood components.    Electronically signed by Josefina Loya MD on 7/2/24 at 10:00 AM EDT

## 2024-07-03 LAB
ANION GAP SERPL CALC-SCNC: 11 MEQ/L (ref 8–16)
BASOPHILS ABSOLUTE: 0.1 THOU/MM3 (ref 0–0.1)
BASOPHILS NFR BLD AUTO: 0.5 %
BUN SERPL-MCNC: 12 MG/DL (ref 7–22)
CALCIUM SERPL-MCNC: 8 MG/DL (ref 8.5–10.5)
CHLORIDE SERPL-SCNC: 101 MEQ/L (ref 98–111)
CO2 SERPL-SCNC: 22 MEQ/L (ref 23–33)
CREAT SERPL-MCNC: 0.8 MG/DL (ref 0.4–1.2)
DEPRECATED MEAN GLUCOSE BLD GHB EST-ACNC: 144 MG/DL (ref 70–126)
DEPRECATED RDW RBC AUTO: 46.5 FL (ref 35–45)
EOSINOPHIL NFR BLD AUTO: 3 %
EOSINOPHILS ABSOLUTE: 0.3 THOU/MM3 (ref 0–0.4)
ERYTHROCYTE [DISTWIDTH] IN BLOOD BY AUTOMATED COUNT: 15.6 % (ref 11.5–14.5)
GFR SERPL CREATININE-BSD FRML MDRD: 77 ML/MIN/1.73M2
GLUCOSE BLD STRIP.AUTO-MCNC: 185 MG/DL (ref 70–108)
GLUCOSE BLD STRIP.AUTO-MCNC: 224 MG/DL (ref 70–108)
GLUCOSE BLD STRIP.AUTO-MCNC: 238 MG/DL (ref 70–108)
GLUCOSE BLD STRIP.AUTO-MCNC: 332 MG/DL (ref 70–108)
GLUCOSE SERPL-MCNC: 166 MG/DL (ref 70–108)
HBA1C MFR BLD HPLC: 6.8 % (ref 4.4–6.4)
HCT VFR BLD AUTO: 24.1 % (ref 37–47)
HCT VFR BLD AUTO: 24.4 % (ref 37–47)
HCT VFR BLD AUTO: 24.8 % (ref 37–47)
HGB BLD-MCNC: 7.6 GM/DL (ref 12–16)
HGB BLD-MCNC: 7.6 GM/DL (ref 12–16)
HGB BLD-MCNC: 7.8 GM/DL (ref 12–16)
IMM GRANULOCYTES # BLD AUTO: 0.07 THOU/MM3 (ref 0–0.07)
IMM GRANULOCYTES NFR BLD AUTO: 0.6 %
LYMPHOCYTES ABSOLUTE: 1.7 THOU/MM3 (ref 1–4.8)
LYMPHOCYTES NFR BLD AUTO: 15.4 %
MCH RBC QN AUTO: 25.8 PG (ref 26–33)
MCHC RBC AUTO-ENTMCNC: 31.5 GM/DL (ref 32.2–35.5)
MCV RBC AUTO: 81.7 FL (ref 81–99)
MONOCYTES ABSOLUTE: 1 THOU/MM3 (ref 0.4–1.3)
MONOCYTES NFR BLD AUTO: 9 %
MRSA DNA SPEC QL NAA+PROBE: NEGATIVE
NEUTROPHILS ABSOLUTE: 7.9 THOU/MM3 (ref 1.8–7.7)
NEUTROPHILS NFR BLD AUTO: 71.5 %
NRBC BLD AUTO-RTO: 0 /100 WBC
PLATELET # BLD AUTO: 419 THOU/MM3 (ref 130–400)
PMV BLD AUTO: 9.9 FL (ref 9.4–12.4)
POTASSIUM SERPL-SCNC: 3.3 MEQ/L (ref 3.5–5.2)
RBC # BLD AUTO: 2.95 MILL/MM3 (ref 4.2–5.4)
SODIUM SERPL-SCNC: 134 MEQ/L (ref 135–145)
VANCOMYCIN SERPL-MCNC: 13.1 UG/ML (ref 0.1–39.9)
WBC # BLD AUTO: 11 THOU/MM3 (ref 4.8–10.8)

## 2024-07-03 PROCEDURE — 80202 ASSAY OF VANCOMYCIN: CPT

## 2024-07-03 PROCEDURE — 99233 SBSQ HOSP IP/OBS HIGH 50: CPT | Performed by: FAMILY MEDICINE

## 2024-07-03 PROCEDURE — 80048 BASIC METABOLIC PNL TOTAL CA: CPT

## 2024-07-03 PROCEDURE — C1751 CATH, INF, PER/CENT/MIDLINE: HCPCS

## 2024-07-03 PROCEDURE — 1200000000 HC SEMI PRIVATE

## 2024-07-03 PROCEDURE — 82948 REAGENT STRIP/BLOOD GLUCOSE: CPT

## 2024-07-03 PROCEDURE — 2580000003 HC RX 258

## 2024-07-03 PROCEDURE — 85018 HEMOGLOBIN: CPT

## 2024-07-03 PROCEDURE — 36415 COLL VENOUS BLD VENIPUNCTURE: CPT

## 2024-07-03 PROCEDURE — 6360000002 HC RX W HCPCS

## 2024-07-03 PROCEDURE — 36410 VNPNXR 3YR/> PHY/QHP DX/THER: CPT

## 2024-07-03 PROCEDURE — 76937 US GUIDE VASCULAR ACCESS: CPT

## 2024-07-03 PROCEDURE — 85014 HEMATOCRIT: CPT

## 2024-07-03 PROCEDURE — 85025 COMPLETE CBC W/AUTO DIFF WBC: CPT

## 2024-07-03 PROCEDURE — 87641 MR-STAPH DNA AMP PROBE: CPT

## 2024-07-03 PROCEDURE — 83036 HEMOGLOBIN GLYCOSYLATED A1C: CPT

## 2024-07-03 PROCEDURE — 6370000000 HC RX 637 (ALT 250 FOR IP)

## 2024-07-03 PROCEDURE — 30233N1 TRANSFUSION OF NONAUTOLOGOUS RED BLOOD CELLS INTO PERIPHERAL VEIN, PERCUTANEOUS APPROACH: ICD-10-PCS | Performed by: FAMILY MEDICINE

## 2024-07-03 PROCEDURE — 1200000003 HC TELEMETRY R&B

## 2024-07-03 PROCEDURE — 36430 TRANSFUSION BLD/BLD COMPNT: CPT

## 2024-07-03 RX ORDER — POTASSIUM CHLORIDE 20 MEQ/1
40 TABLET, EXTENDED RELEASE ORAL ONCE
Status: DISCONTINUED | OUTPATIENT
Start: 2024-07-03 | End: 2024-07-17 | Stop reason: HOSPADM

## 2024-07-03 RX ORDER — SODIUM CHLORIDE 9 MG/ML
INJECTION, SOLUTION INTRAVENOUS PRN
Status: COMPLETED | OUTPATIENT
Start: 2024-07-03 | End: 2024-07-05

## 2024-07-03 RX ADMIN — LISINOPRIL 20 MG: 20 TABLET ORAL at 09:25

## 2024-07-03 RX ADMIN — PIPERACILLIN SODIUM AND TAZOBACTAM SODIUM 3375 MG: 3; .375 INJECTION, POWDER, LYOPHILIZED, FOR SOLUTION INTRAVENOUS at 04:28

## 2024-07-03 RX ADMIN — ESCITALOPRAM OXALATE 10 MG: 10 TABLET, FILM COATED ORAL at 09:25

## 2024-07-03 RX ADMIN — SODIUM CHLORIDE, PRESERVATIVE FREE 10 ML: 5 INJECTION INTRAVENOUS at 09:28

## 2024-07-03 RX ADMIN — VANCOMYCIN HYDROCHLORIDE 500 MG: 500 INJECTION, POWDER, LYOPHILIZED, FOR SOLUTION INTRAVENOUS at 09:28

## 2024-07-03 RX ADMIN — PIPERACILLIN SODIUM AND TAZOBACTAM SODIUM 3375 MG: 3; .375 INJECTION, POWDER, LYOPHILIZED, FOR SOLUTION INTRAVENOUS at 17:52

## 2024-07-03 RX ADMIN — POTASSIUM BICARBONATE 40 MEQ: 782 TABLET, EFFERVESCENT ORAL at 15:45

## 2024-07-03 RX ADMIN — INSULIN LISPRO 6 UNITS: 100 INJECTION, SOLUTION INTRAVENOUS; SUBCUTANEOUS at 12:58

## 2024-07-03 RX ADMIN — PIPERACILLIN SODIUM AND TAZOBACTAM SODIUM 3375 MG: 3; .375 INJECTION, POWDER, LYOPHILIZED, FOR SOLUTION INTRAVENOUS at 11:08

## 2024-07-03 RX ADMIN — INSULIN LISPRO 2 UNITS: 100 INJECTION, SOLUTION INTRAVENOUS; SUBCUTANEOUS at 17:52

## 2024-07-03 ASSESSMENT — PAIN SCALES - GENERAL
PAINLEVEL_OUTOF10: 0
PAINLEVEL_OUTOF10: 0

## 2024-07-03 NOTE — PROGRESS NOTES
Podiatric Progress Note  Niya Blankenship  Subjective :   7/3/24  Patient was seen bedside today on behalf of Dr. Phelan.  Patient is alert and oriented to person, place, time, and event. Patient's family is present at bedside. Patient relates she is feeling well overall and denies pain to the right lower extremity. Patient states she had a good night's sleep last night. Patient's family states that the patient was coughing last night and she did vomit. Patient denies nausea, vomiting, fever, chills, shortness of breath, or chest pain at this time. Denies any other pedal complaints at this time.    7/2/24  Patient was seen bedside today alongside Dr. Phelan.  Patient relates minimal pain to the right lower extremity.  States that she overall feels well however is tired and would like to sleep.  Family at bedside appreciative the visit and wishes to discuss prognosis of wound and care from this point.  Patient denies any N/V/F/C/SOB or CP. No other pedal complaints.    HPI  Patient is a 74 y.o. female with a history of diabetes mellitus and history of chronic right heel ulcer seen bedside today on behalf of Dr Phelan. Patient appeared pleasant, was oriented to person, place and time and in no acute distress. Patient states her daughter noticed her heel had turned a \"darker color\" and felt \"hollow\". Patient states she has been receiving wound care with Dr. Phelan in [place] and has been using b&w on the wound. However, she states felt like she was having chills yesterday and her daughters stated they felt her heel wound had gotten worse so she decided to come to the ED today to have it looked at. Patient denies any N/V/F/SOB or CP. No other pedal concerns.      Current Medications:    Current Facility-Administered Medications: 0.9 % sodium chloride infusion, , IntraVENous, PRN  sodium chloride flush 0.9 % injection 5-40 mL, 5-40 mL, IntraVENous, 2 times per day  sodium chloride flush 0.9 % injection 5-40 mL, 5-40 mL,

## 2024-07-03 NOTE — CARE COORDINATION
7/3/24, 8:52 AM EDT    DISCHARGE ON GOING EVALUATION    [facility] day: 2  Location: -12/012-A Reason for admit: Type 2 diabetes mellitus with right diabetic foot ulcer (HCC) [E11.621, L97.519]  Anemia, unspecified type [D64.9]  Leukocytosis, unspecified type [D72.829]  Non-healing open wound of heel, right, initial encounter [S91.301A]     Procedures: na    Imaging since last note: na    Barriers to Discharge: Daily dressing changes per podiatry to right foot ulcer. Possible additional surgery; muscle flap vs amputation per podiatry. Pt talking with family.    PCP: Taylor Maza APRN - NP  Readmission Risk Score: 14.3    Patient Goals/Plan/Treatment Preferences: from home with daughter and S-I-L; pt wheelchair bound and agrees to HH. Will follow POC.

## 2024-07-03 NOTE — PROGRESS NOTES
PROGRESS NOTE      Patient:  Niya Blankenship  Unit/Bed:7K-12/012-A  YOB: 1950  MRN: [id number]   Acct: [id number]    PCP: Taylor Maza APRN - NP    Date of Admission: 7/1/2024 LOS: 2    Date of Evaluation:  7/3/2024    Anticipated Discharge: pending clinical course    Assessment/Plan:    Osteomyelitis of right foot seen on imaging with leukocytosis  MRI right foot 7/2: Soft tissue defect in the plantar soft tissues over the calcaneus. Abnormal signal intensity in the calcaneus consistent with involvement by osteomyelitis. Abnormal signal intensity in the plantar soft tissues over the medial aspect   of the calcaneus consistent with inflammatory process. There was air in the soft tissues seen on the plain radiographs. Degenerative changes. Osteochondral defect in the talar dome. Spurring at the attachment of the Achilles tendon upon the calcaneus. Soft tissue swelling over the foot.   X-ray right foot 7/1: No fracture or dislocation   Podiatry following, continue to appreciate recommendations.   Plan for OR 7/5 for right heel partial calcanectomy and debridement of wound. hold anticoagulants 7/4 PM, hospitalist to complete pre-op evaluation and risk stratification.   Continue daily dressing   MRSA negative   S/p vancomycin (7/1-7/3)  Continue Zosyn (7/1-7/7)  blood culture x 2 NG 48H  Heel Culture: nonfermenting gram negative bacilli Abnormal    WBC 11.0 compared to 13.1 on admission  CRP 15.8  ID consulted and following and will continue IV Abx at this time   Per chart review, Patient underwent bedside debridement on 03/27 then on 03/29 she underwent surgical debridement in the OR with Podiatry. Wound VAC was placed on right calcaneus and ankle . S.p augmentin for 4 weeks (end of treatment 04/25/2024 )  Iron deficiency anemia  Morning of 7/2 hemoglobin dropped from 7.2 to 6.7.    Repeat H&H showed 7.2/23.2, stable  Ferritin 903, iron 12, TIBC 127, % iron saturation 9, folate 13.6, vitamin B12  pain or tingling. She had a low-grade fever of 99.5F and notes \"darkening\" of the foot with a foul smell. She has some baseline confusion, but her daughter notes worsening confusion and agitation in the last few days. The pt had been on Cipro and Keflex PO outpatient, with the most recent dose 7/1 in the morning. Her daughter was present on 7/1 to assist with history. \"     Subjective/HPI:   Niya Blankenship feels well overall. She denies HA, SOB, CP, N/V/D. Had long discussion with patient and daughter about need to continue IV Abx and pt is adamant about not getting her foot amputated. Informed patient the risks and to forward all further questions about the surgery to podiatry as they can give more details/options. Informed that ID will come by to help out with the Abx and for further recs. Pt and daughter understand and all questions/concerns answered.       PMH, SURGICAL HX, FH, SOCIAL HX reviewed and updated as needed.    Medications:  Reviewed    Infusion Medications    sodium chloride      sodium chloride      dextrose       Scheduled Medications    potassium chloride  40 mEq Oral Once    sodium chloride flush  5-40 mL IntraVENous 2 times per day    [Held by provider] enoxaparin  40 mg SubCUTAneous Daily    piperacillin-tazobactam  3,375 mg IntraVENous Q8H    insulin lispro  0-8 Units SubCUTAneous TID WC    insulin lispro  0-4 Units SubCUTAneous Nightly    escitalopram  10 mg Oral Daily    lisinopril  20 mg Oral Daily     PRN Meds: sodium chloride, sodium chloride flush, sodium chloride, potassium chloride **OR** potassium alternative oral replacement **OR** potassium chloride, ondansetron **OR** ondansetron, polyethylene glycol, acetaminophen **OR** acetaminophen, zinc oxide, glucose, dextrose bolus **OR** dextrose bolus, glucagon (rDNA), dextrose      Intake/Output Summary (Last 24 hours) at 7/3/2024 1850  Last data filed at 7/3/2024 1814  Gross per 24 hour   Intake 2247.77 ml   Output --   Net 2247.77 ml        Exam:  BP (!) 164/65   Pulse 88   Temp 98.6 °F (37 °C) (Oral)   Resp 18   Ht 1.626 m (5' 4\")   Wt 59 kg (130 lb)   SpO2 98%   BMI 22.31 kg/m²     Physical Exam  Vitals reviewed.   Constitutional:       Appearance: She is normal weight.   HENT:      Head: Normocephalic and atraumatic.   Cardiovascular:      Rate and Rhythm: Normal rate and regular rhythm.      Heart sounds: Normal heart sounds.   Pulmonary:      Breath sounds: Normal breath sounds.   Abdominal:      Palpations: Abdomen is soft.      Tenderness: There is no abdominal tenderness.   Skin:     General: Skin is warm.      Comments: Right foot wrapped in dressing    Neurological:      Mental Status: She is alert. Mental status is at baseline.   Psychiatric:         Behavior: Behavior normal.        All labs reviewed and interpreted by me:  Labs:   Recent Labs     07/01/24  1350 07/02/24  0551 07/02/24  0907 07/02/24  2319 07/03/24  0607 07/03/24  1131   WBC 13.1* 12.1*  --   --  11.0*  --    HGB 7.2* 6.7*   < > 6.4* 7.6* 7.8*   HCT 23.4* 21.7*   < > 21.1* 24.1* 24.8*   * 436*  --   --  419*  --     < > = values in this interval not displayed.     Recent Labs     07/01/24  1350 07/02/24  0551 07/03/24  0607   * 136 134*   K 3.4* 3.1* 3.3*   CL 96* 102 101   CO2 21* 21* 22*   BUN 20 16 12   CREATININE 0.9 0.9 0.8   CALCIUM 8.5 8.3* 8.0*     No results for input(s): \"AST\", \"ALT\", \"BILIDIR\", \"BILITOT\", \"ALKPHOS\" in the last 72 hours.  No results for input(s): \"INR\" in the last 72 hours.  No results for input(s): \"CKTOTAL\", \"TROPONINT\" in the last 72 hours.    Urinalysis:      Lab Results   Component Value Date/Time    NITRU NEGATIVE 07/01/2024 03:17 PM    WBCUA 0-2 07/01/2024 03:17 PM    BACTERIA NONE SEEN 07/01/2024 03:17 PM    RBCUA 0-2 07/01/2024 03:17 PM    BLOODU NEGATIVE 07/01/2024 03:17 PM    GLUCOSEU NEGATIVE 07/01/2024 03:17 PM       All radiology images and reports reviewed and interpreted by me:  Radiology:  Vascular duplex

## 2024-07-03 NOTE — PROGRESS NOTES
Podiatric Progress Note  Niya Blankenship  Subjective :   7/3/24  Patient was seen bedside today with Dr. Phelan.  Patient is alert and oriented to person, place, time, and event. Patient's family is present at bedside. Patient relates she is feeling well overall and denies pain to the right lower extremity. Patient states she had a good night's sleep last night. Patient's family states that the patient was coughing last night and she did vomit. Patient denies nausea, vomiting, fever, chills, shortness of breath, or chest pain at this time. Denies any other pedal complaints at this time.    7/2/24  Patient was seen bedside today alongside Dr. Phelan.  Patient relates minimal pain to the right lower extremity.  States that she overall feels well however is tired and would like to sleep.  Family at bedside appreciative the visit and wishes to discuss prognosis of wound and care from this point.  Patient denies any N/V/F/C/SOB or CP. No other pedal complaints.    HPI  Patient is a 74 y.o. female with a history of diabetes mellitus and history of chronic right heel ulcer seen bedside today on behalf of Dr Phelan. Patient appeared pleasant, was oriented to person, place and time and in no acute distress. Patient states her daughter noticed her heel had turned a \"darker color\" and felt \"hollow\". Patient states she has been receiving wound care with Dr. Phelan in [place] and has been using b&w on the wound. However, she states felt like she was having chills yesterday and her daughters stated they felt her heel wound had gotten worse so she decided to come to the ED today to have it looked at. Patient denies any N/V/F/SOB or CP. No other pedal concerns.      Current Medications:    Current Facility-Administered Medications: 0.9 % sodium chloride infusion, , IntraVENous, PRN  potassium chloride (KLOR-CON M) extended release tablet 40 mEq, 40 mEq, Oral, Once  sodium chloride flush 0.9 % injection 5-40 mL, 5-40 mL, IntraVENous, 2 times

## 2024-07-03 NOTE — PROGRESS NOTES
PowerGlide Insertion Procedure Note  This line is NOT a central line, please do not use this for central solutions.   Line can be removed by the primary nurse or LPN, but should not be removed by the tech.  Line can be used to collect labs, but is NOT guaranteed to draw blood the entire duration of dwell.   Cathflo CANNOT be used on this device.   Patient CAN discharge home/to SNF with this device if ordered.   PowerGlide device can dwell for up to 29 days.     Niya Blankenship   Admitted- 7/1/2024  1:19 PM  Admission diagnosis- Type 2 diabetes mellitus with right diabetic foot ulcer (HCC) [E11.621, L97.519]  Anemia, unspecified type [D64.9]  Leukocytosis, unspecified type [D72.829]  Non-healing open wound of heel, right, initial encounter [S91.301A]      Attending Physician- Nicole Fernandez MD    Indication for Insertion: Poor Vascular Access    Catheter Insertion Date- 7/3/2024   Catheter Brand-Bard  Lot Number- [id number]  Gauge-20  Lumen-single    Insertion Site- DARION Cephalic  Vein Diameter- 3.8 mm  Catheter Length- 10 cm  Internal Length- 10 cm     Midline Tip Terminates in the Axillary- Yes  Upper Arm Circumference- 24cm  Easy insertion- Yes  Able to Aspirate blood- Yes  Easy Flush- Yes    Midline insertion successful- Yes  Ultrasound- yes    Okay To Use Midline- Yes      Electronically signed by Sheri Theodore RN on 7/3/2024 at 3:22 PM

## 2024-07-03 NOTE — PROGRESS NOTES
[facility]   Pharmacy Pharmacokinetic Monitoring Service - Vancomycin    Indication: SSTI  Target Concentration: Goal AUC/GIULIA 400-600 mg*hr/L  Day of Therapy: 3  Additional Antimicrobials: zosyn    Pertinent Laboratory Values:   Wt Readings from Last 1 Encounters:   07/01/24 59 kg (130 lb)     Temp Readings from Last 1 Encounters:   07/03/24 98.2 °F (36.8 °C) (Oral)     Estimated Creatinine Clearance: 52 mL/min (based on SCr of 0.8 mg/dL).  Recent Labs     07/02/24  0551 07/03/24  0607   CREATININE 0.9 0.8   BUN 16 12   WBC 12.1* 11.0*     Pertinent Cultures:  Date Source Results   7/1/24 Right Heel Mixed growth - corynebacterium, enterococcus, enteric GNB  GS - GNB, GPB, GPC singly/prs  Cx - NF GNB     Recent vancomycin administrations                     vancomycin (VANCOCIN) 500 mg in sodium chloride 0.9 % 100 mL IVPB (mg) 500 mg New Bag 07/03/24 0928     500 mg New Bag 07/02/24 2148     500 mg New Bag  0856    vancomycin (VANCOCIN) 1250 mg in sodium chloride 0.9% 250 mL IVPB (mg) 1,250 mg New Bag 07/01/24 1937                    Assessment:  Date/Time Current Dose Concentration Timing of Concentration (h) AUC C trough,ss   7/3/24 @ 1131 500mg q12h 13.1 mcg/mL 2H 3M 319 9.1   Note: Serum concentrations collected for AUC dosing may appear elevated if collected in close proximity to the dose administered, this is not necessarily an indication of toxicity    Plan:  Current dosing regimen is sub-therapeutic  Increase dose to vancomycin 1000mg q12h  Pharmacy will continue to monitor patient and adjust therapy as indicated    Thank you for the consult,  Merlene CAREY.Ph., BCPS., 7/3/2024,1:14 PM

## 2024-07-03 NOTE — CONSULTS
CONSULTATION NOTE :ID       Patient - Niya Blankenship,  Age - 74 y.o.    - 1950      Room Number - 7K-12/012-A   N -  [id number]   [facility] # - [id number]  Date of Admission -  2024  1:19 PM  Patient's PCP: Taylor Maza APRN - NP     Requesting Physician: Nicole Fernandez MD    REASON FOR CONSULTATION   Right heel necrotic wound with osteomyelitis  CHIEF COMPLAINT   Right heel wound    HISTORY OF PRESENT ILLNESS       This is a very pleasant 74 y.o. female who was admitted to the hospital with a chief complaints of worsening right heel wound.  She had a wound since January had debridement in the past was treated with prolonged oral antibiotics unfortunately she had worsening wound he developed an eschar with worsening redness drainage and MRI shows chronic osteomyelitis with bone erosion and gas-forming infection.  The infection has spread to the midfoot.  She has history of diabetes with neuropathy she is currently on broad-spectrum antibiotics.  She is from [place] no history of smoking.  Her medical record was reviewed discussed with her family    PAST MEDICAL  HISTORY       Past Medical History:   Diagnosis Date    Diabetes mellitus (HCC)        PAST SURGICAL HISTORY   Debridement of right heel wound    MEDICATIONS:       Scheduled Meds:   sodium chloride flush  5-40 mL IntraVENous 2 times per day    [Held by provider] enoxaparin  40 mg SubCUTAneous Daily    piperacillin-tazobactam  3,375 mg IntraVENous Q8H    insulin lispro  0-8 Units SubCUTAneous TID WC    insulin lispro  0-4 Units SubCUTAneous Nightly    escitalopram  10 mg Oral Daily    lisinopril  20 mg Oral Daily     Continuous Infusions:   sodium chloride      sodium chloride      dextrose       PRN Meds:sodium chloride, sodium chloride flush, sodium chloride, potassium chloride **OR** potassium alternative oral replacement **OR** potassium chloride, ondansetron **OR** ondansetron, polyethylene glycol, acetaminophen  **OR** acetaminophen, zinc oxide, glucose, dextrose bolus **OR** dextrose bolus, glucagon (rDNA), dextrose  Allergies:   ALLERGIES:    Patient has no known allergies.        SOCIAL HISTORY:     TOBACCO:   reports that she has never smoked. She has never used smokeless tobacco.     ETOH:   reports current alcohol use.  Patient currently lives with family       FAMILY HISTORY:     History reviewed. No pertinent family history.    REVIEW OF SYSTEMS:     Constitutional: no fever, no night sweats, no fatigue, no weight loss.  Head: no head ache , no head injury, no migranes.  Eye: no eye discharge, blurring of vision, no double vision,no eye pain.  Ears: no hearing difficulty, no tinnitus  Mouth/throat: no ulceration, dental caries , dysphagia, no hoarseness and voice change  Respiratory: no cough no chest pain,no shortness of breath,no wheezing  CVS: no palpitation, no chest pain,   GI: no abdominal pain, no nausea , no vomiting, no constipation,no diarrhea.  SONNY: no dysuria, frequency and urgency, no hematuria, no kidney stones  Musculoskeletal: As noted in HPI endocrine: no polyuria, polydipsia, no cold or heat intolerance  Hematology: no anemia, no easy brusing or bleeding, no hx of clotting disorder  Dermatology: no skin rash, no skin lesions, no pruritis,  Neurological:no headaches,no dizziness, no seizure, no numbness.  Psychiatry: no depression, no anxiety,no panic attacks, no suicide ideation    PHYSICAL EXAM:     BP (!) 158/71   Pulse 84   Temp 99.3 °F (37.4 °C) (Oral)   Resp 18   Ht 1.626 m (5' 4\")   Wt 59 kg (130 lb)   SpO2 98%   BMI 22.31 kg/m²   General apperance:  Awake, alert, not in distress.  HEENT: pink conjunctiva, unicteric sclera, moist oral mucosa.  Chest: Bilateral air entry.  Cardiovascular:  RRR ,S1S2, no murmur or gallop.  Abdomen:  Soft, non tender to palpation.  Extremities: Dressit right foot wound she has a necrotic heel  Skin:  Warm and dry.  CNS: Awake and

## 2024-07-03 NOTE — PLAN OF CARE
Problem: Discharge Planning  Goal: Discharge to home or other facility with appropriate resources  7/2/2024 2201 by Ema Vazquez, RN  Outcome: Progressing  Flowsheets (Taken 7/2/2024 2201)  Discharge to home or other facility with appropriate resources:   Identify barriers to discharge with patient and caregiver   Identify discharge learning needs (meds, wound care, etc)   Refer to discharge planning if patient needs post-hospital services based on physician order or complex needs related to functional status, cognitive ability or social support system   Arrange for interpreters to assist at discharge as needed   Arrange for needed discharge resources and transportation as appropriate  Note: Feedback readiness for discharge.  Promoting inclusion for discharge planning.   7/2/2024 1848 by Kesha Brannon LPN  Outcome: Progressing  Flowsheets (Taken 7/2/2024 1848)  Discharge to home or other facility with appropriate resources:   Identify barriers to discharge with patient and caregiver   Arrange for needed discharge resources and transportation as appropriate   Identify discharge learning needs (meds, wound care, etc)     Problem: Safety - Adult  Goal: Free from fall injury  7/2/2024 2201 by Ema Vazquez RN  Outcome: Progressing  Flowsheets (Taken 7/2/2024 2201)  Free From Fall Injury:   Instruct family/caregiver on patient safety   Based on caregiver fall risk screen, instruct family/caregiver to ask for assistance with transferring infant if caregiver noted to have fall risk factors  Note: Bed in low position  Call light in reach  Bed wheel lock  Teaching to use call light for assistance  Hourly Rounding  Non Skid Socks  Family at bedside  Bed Alarm in Use    7/2/2024 1848 by Kesha Brannon LPN  Outcome: Progressing  Flowsheets (Taken 7/2/2024 1848)  Free From Fall Injury: Instruct family/caregiver on patient safety     Problem: Skin/Tissue Integrity  Goal: Absence of new skin breakdown  Description: 1.   Monitor for areas of redness and/or skin breakdown  2.  Assess vascular access sites hourly  3.  Every 4-6 hours minimum:  Change oxygen saturation probe site  4.  Every 4-6 hours:  If on nasal continuous positive airway pressure, respiratory therapy assess nares and determine need for appliance change or resting period.  7/2/2024 2201 by Ema Vazquez RN  Outcome: Progressing  Note: Maintain every 2 hour turn.  Inspect skin areas  Address issues with open skin with proper wound care per policies and proceduresEncouraging good fluid and diet intake.  Encourage hygiene  Increase movement and encourage turns.   7/2/2024 1848 by Kesha Brannon LPN  Outcome: Progressing  Note: No new skin breakdown noted at this time this shift.     Problem: Pain  Goal: Verbalizes/displays adequate comfort level or baseline comfort level  7/2/2024 2201 by Ema Vazquez, RN  Outcome: Progressing  Flowsheets (Taken 7/2/2024 2201)  Verbalizes/displays adequate comfort level or baseline comfort level:   Administer analgesics based on type and severity of pain and evaluate response   Consider cultural and social influences on pain and pain management   Assess pain using appropriate pain scale   Encourage patient to monitor pain and request assistance   Implement non-pharmacological measures as appropriate and evaluate response   Notify Licensed Independent Practitioner if interventions unsuccessful or patient reports new pain  Note: Utilize correct pain assessment tool based on patient abilities.  Use appropriate pain medications base on appropriate tools.  Utilize non-pharmacologic pain reduction methods to supplement ordered pain medications.Educate patient on pain control.  Educate patient on acceptable pain level with chronic pain.   Talk to patient about non-pharmaceutical pain interventions.Encourage use of medication when needed.  Promote rest and distractions from pain.  7/2/2024 1848 by Kesha Brannon LPN  Outcome:  are monitored and maintained or improved  7/2/2024 2201 by Ema Vazquez, RN  Outcome: Progressing  Flowsheets (Taken 7/2/2024 2201)  Care Plan - Patient's Chronic Conditions and Co-Morbidity Symptoms are Monitored and Maintained or Improved:   Monitor and assess patient's chronic conditions and comorbid symptoms for stability, deterioration, or improvement   Collaborate with multidisciplinary team to address chronic and comorbid conditions and prevent exacerbation or deterioration   Update acute care plan with appropriate goals if chronic or comorbid symptoms are exacerbated and prevent overall improvement and discharge  7/2/2024 1848 by Kesha Brannon LPN  Outcome: Progressing  Flowsheets (Taken 7/2/2024 1848)  Care Plan - Patient's Chronic Conditions and Co-Morbidity Symptoms are Monitored and Maintained or Improved:   Monitor and assess patient's chronic conditions and comorbid symptoms for stability, deterioration, or improvement   Collaborate with multidisciplinary team to address chronic and comorbid conditions and prevent exacerbation or deterioration    Care plan reviewed with patient.  Patient verbalizes understanding of the plan of care and contributes to goal setting.

## 2024-07-03 NOTE — PROGRESS NOTES
Teaching Note:    I was present with the resident during the visit. I spent a great deal of time discussing with the patient and the patient's family the problems.  We discussed the bad infection in the large ulceration.  We discussed malnutrition.  We discussed anemia.  And we discussed these as road blocks for adequate healing for major reconstruction.  And that we would recommend the amputation.  The patient denied and would not agree to this.  She wants to save the foot at this time.  This case was discussed with infectious disease Dr. Cade.  There is in agreement that if the patient will not have any amputation all we can do his IV antibiotics debridement through the weekend and then send her home on oral antibiotics.  She needs major nutrition improvement and to get her anemia improved.  Her blood sugars are adequate.     I discussed the case with the resident and agree with the findings and the plan as documented in the residents note.    Cb Phelan DPM, FACFAS  Podiatric Medicine & Surgery       [facility]

## 2024-07-04 LAB
ANION GAP SERPL CALC-SCNC: 9 MEQ/L (ref 8–16)
BACTERIA SPEC AEROBE CULT: ABNORMAL
BACTERIA SPEC AEROBE CULT: ABNORMAL
BACTERIA SPEC ANAEROBE CULT: ABNORMAL
BASOPHILS ABSOLUTE: 0.1 THOU/MM3 (ref 0–0.1)
BASOPHILS NFR BLD AUTO: 0.6 %
BUN SERPL-MCNC: 9 MG/DL (ref 7–22)
CALCIUM SERPL-MCNC: 8.1 MG/DL (ref 8.5–10.5)
CHLORIDE SERPL-SCNC: 100 MEQ/L (ref 98–111)
CO2 SERPL-SCNC: 26 MEQ/L (ref 23–33)
CREAT SERPL-MCNC: 0.8 MG/DL (ref 0.4–1.2)
DEPRECATED RDW RBC AUTO: 46.3 FL (ref 35–45)
EKG Q-T INTERVAL: 378 MS
EKG QRS DURATION: 88 MS
EKG QTC CALCULATION (BAZETT): 454 MS
EKG R AXIS: 66 DEGREES
EKG T AXIS: 63 DEGREES
EKG VENTRICULAR RATE: 87 BPM
EOSINOPHIL NFR BLD AUTO: 2.8 %
EOSINOPHILS ABSOLUTE: 0.3 THOU/MM3 (ref 0–0.4)
ERYTHROCYTE [DISTWIDTH] IN BLOOD BY AUTOMATED COUNT: 15.4 % (ref 11.5–14.5)
GFR SERPL CREATININE-BSD FRML MDRD: 77 ML/MIN/1.73M2
GLUCOSE BLD STRIP.AUTO-MCNC: 233 MG/DL (ref 70–108)
GLUCOSE BLD STRIP.AUTO-MCNC: 257 MG/DL (ref 70–108)
GLUCOSE BLD STRIP.AUTO-MCNC: 287 MG/DL (ref 70–108)
GLUCOSE BLD STRIP.AUTO-MCNC: 389 MG/DL (ref 70–108)
GLUCOSE SERPL-MCNC: 229 MG/DL (ref 70–108)
GRAM STN SPEC: ABNORMAL
HCT VFR BLD AUTO: 24.9 % (ref 37–47)
HCT VFR BLD AUTO: 25.6 % (ref 37–47)
HGB BLD-MCNC: 7.7 GM/DL (ref 12–16)
HGB BLD-MCNC: 8.2 GM/DL (ref 12–16)
IMM GRANULOCYTES # BLD AUTO: 0.09 THOU/MM3 (ref 0–0.07)
IMM GRANULOCYTES NFR BLD AUTO: 0.9 %
LYMPHOCYTES ABSOLUTE: 1.5 THOU/MM3 (ref 1–4.8)
LYMPHOCYTES NFR BLD AUTO: 14.9 %
MCH RBC QN AUTO: 25.4 PG (ref 26–33)
MCHC RBC AUTO-ENTMCNC: 30.9 GM/DL (ref 32.2–35.5)
MCV RBC AUTO: 82.2 FL (ref 81–99)
MONOCYTES ABSOLUTE: 0.9 THOU/MM3 (ref 0.4–1.3)
MONOCYTES NFR BLD AUTO: 8.5 %
NEUTROPHILS ABSOLUTE: 7.3 THOU/MM3 (ref 1.8–7.7)
NEUTROPHILS NFR BLD AUTO: 72.3 %
NRBC BLD AUTO-RTO: 0 /100 WBC
ORGANISM: ABNORMAL
PLATELET # BLD AUTO: 444 THOU/MM3 (ref 130–400)
PMV BLD AUTO: 10 FL (ref 9.4–12.4)
POTASSIUM SERPL-SCNC: 3.8 MEQ/L (ref 3.5–5.2)
RBC # BLD AUTO: 3.03 MILL/MM3 (ref 4.2–5.4)
SODIUM SERPL-SCNC: 135 MEQ/L (ref 135–145)
WBC # BLD AUTO: 10.1 THOU/MM3 (ref 4.8–10.8)

## 2024-07-04 PROCEDURE — 6360000002 HC RX W HCPCS

## 2024-07-04 PROCEDURE — 6370000000 HC RX 637 (ALT 250 FOR IP)

## 2024-07-04 PROCEDURE — 93005 ELECTROCARDIOGRAM TRACING: CPT

## 2024-07-04 PROCEDURE — 85025 COMPLETE CBC W/AUTO DIFF WBC: CPT

## 2024-07-04 PROCEDURE — 85018 HEMOGLOBIN: CPT

## 2024-07-04 PROCEDURE — 82948 REAGENT STRIP/BLOOD GLUCOSE: CPT

## 2024-07-04 PROCEDURE — 93010 ELECTROCARDIOGRAM REPORT: CPT | Performed by: INTERNAL MEDICINE

## 2024-07-04 PROCEDURE — 1200000003 HC TELEMETRY R&B

## 2024-07-04 PROCEDURE — 36415 COLL VENOUS BLD VENIPUNCTURE: CPT

## 2024-07-04 PROCEDURE — 2580000003 HC RX 258

## 2024-07-04 PROCEDURE — 85014 HEMATOCRIT: CPT

## 2024-07-04 PROCEDURE — 80048 BASIC METABOLIC PNL TOTAL CA: CPT

## 2024-07-04 PROCEDURE — 99232 SBSQ HOSP IP/OBS MODERATE 35: CPT | Performed by: FAMILY MEDICINE

## 2024-07-04 RX ORDER — INSULIN GLARGINE 100 [IU]/ML
7 INJECTION, SOLUTION SUBCUTANEOUS NIGHTLY
Status: DISCONTINUED | OUTPATIENT
Start: 2024-07-04 | End: 2024-07-04 | Stop reason: SDUPTHER

## 2024-07-04 RX ORDER — HYDRALAZINE HYDROCHLORIDE 20 MG/ML
10 INJECTION INTRAMUSCULAR; INTRAVENOUS EVERY 6 HOURS PRN
Status: DISCONTINUED | OUTPATIENT
Start: 2024-07-04 | End: 2024-07-17 | Stop reason: HOSPADM

## 2024-07-04 RX ORDER — HYDROXYZINE PAMOATE 25 MG/1
25 CAPSULE ORAL 3 TIMES DAILY PRN
Status: DISCONTINUED | OUTPATIENT
Start: 2024-07-04 | End: 2024-07-17 | Stop reason: HOSPADM

## 2024-07-04 RX ORDER — INSULIN GLARGINE 100 [IU]/ML
7 INJECTION, SOLUTION SUBCUTANEOUS NIGHTLY
Status: DISCONTINUED | OUTPATIENT
Start: 2024-07-05 | End: 2024-07-06

## 2024-07-04 RX ORDER — INSULIN GLARGINE 100 [IU]/ML
7 INJECTION, SOLUTION SUBCUTANEOUS ONCE
Status: COMPLETED | OUTPATIENT
Start: 2024-07-04 | End: 2024-07-04

## 2024-07-04 RX ADMIN — PIPERACILLIN SODIUM AND TAZOBACTAM SODIUM 3375 MG: 3; .375 INJECTION, POWDER, LYOPHILIZED, FOR SOLUTION INTRAVENOUS at 00:44

## 2024-07-04 RX ADMIN — INSULIN LISPRO 8 UNITS: 100 INJECTION, SOLUTION INTRAVENOUS; SUBCUTANEOUS at 18:03

## 2024-07-04 RX ADMIN — PIPERACILLIN SODIUM AND TAZOBACTAM SODIUM 3375 MG: 3; .375 INJECTION, POWDER, LYOPHILIZED, FOR SOLUTION INTRAVENOUS at 17:41

## 2024-07-04 RX ADMIN — INSULIN LISPRO 2 UNITS: 100 INJECTION, SOLUTION INTRAVENOUS; SUBCUTANEOUS at 12:17

## 2024-07-04 RX ADMIN — INSULIN GLARGINE 7 UNITS: 100 INJECTION, SOLUTION SUBCUTANEOUS at 18:03

## 2024-07-04 RX ADMIN — ESCITALOPRAM OXALATE 10 MG: 10 TABLET, FILM COATED ORAL at 09:04

## 2024-07-04 RX ADMIN — PIPERACILLIN SODIUM AND TAZOBACTAM SODIUM 3375 MG: 3; .375 INJECTION, POWDER, LYOPHILIZED, FOR SOLUTION INTRAVENOUS at 09:03

## 2024-07-04 RX ADMIN — INSULIN LISPRO 4 UNITS: 100 INJECTION, SOLUTION INTRAVENOUS; SUBCUTANEOUS at 09:03

## 2024-07-04 RX ADMIN — LISINOPRIL 20 MG: 20 TABLET ORAL at 09:04

## 2024-07-04 NOTE — PROGRESS NOTES
Podiatric Progress Note  Niya Blankenship  Subjective :     7/4/24  Progress patient was seen bedside today on behalf of Dr. Phelan.  Patient is alert and oriented to person, place, time and event.  Patient's family is present at bedside.  Patient states that she does not have any pain this morning and had a good night sleep.  Patient is understanding that she will have her procedure tomorrow of partial calcanectomy with wound debridement.  Patient denies any nausea, fever, cough, chills, shortness of breath or chest pain.  Patient denies any other pedal concerns at this time.    7/3/24  Patient was seen bedside today with Dr. Phelan.  Patient is alert and oriented to person, place, time, and event. Patient's family is present at bedside. Patient relates she is feeling well overall and denies pain to the right lower extremity. Patient states she had a good night's sleep last night. Patient's family states that the patient was coughing last night and she did vomit. Patient denies nausea, vomiting, fever, chills, shortness of breath, or chest pain at this time. Denies any other pedal complaints at this time.    7/2/24  Patient was seen bedside today alongside Dr. Phelan.  Patient relates minimal pain to the right lower extremity.  States that she overall feels well however is tired and would like to sleep.  Family at bedside appreciative the visit and wishes to discuss prognosis of wound and care from this point.  Patient denies any N/V/F/C/SOB or CP. No other pedal complaints.    HPI  Patient is a 74 y.o. female with a history of diabetes mellitus and history of chronic right heel ulcer seen bedside today on behalf of Dr Phelan. Patient appeared pleasant, was oriented to person, place and time and in no acute distress. Patient states her daughter noticed her heel had turned a \"darker color\" and felt \"hollow\". Patient states she has been receiving wound care with Dr. Phelan in [place] and has been using b&w on the wound. However,

## 2024-07-04 NOTE — PLAN OF CARE
Problem: Discharge Planning  Goal: Discharge to home or other facility with appropriate resources  Outcome: Progressing  Flowsheets (Taken 7/4/2024 1723)  Discharge to home or other facility with appropriate resources:   Identify barriers to discharge with patient and caregiver   Arrange for needed discharge resources and transportation as appropriate   Identify discharge learning needs (meds, wound care, etc)     Problem: Safety - Adult  Goal: Free from fall injury  Outcome: Progressing  Flowsheets (Taken 7/4/2024 1723)  Free From Fall Injury: Instruct family/caregiver on patient safety     Problem: Skin/Tissue Integrity  Goal: Absence of new skin breakdown  Description: 1.  Monitor for areas of redness and/or skin breakdown  2.  Assess vascular access sites hourly  3.  Every 4-6 hours minimum:  Change oxygen saturation probe site  4.  Every 4-6 hours:  If on nasal continuous positive airway pressure, respiratory therapy assess nares and determine need for appliance change or resting period.  Outcome: Progressing  Note: No new skin breakdown noted at this time this shift.     Problem: Pain  Goal: Verbalizes/displays adequate comfort level or baseline comfort level  Outcome: Progressing  Flowsheets (Taken 7/4/2024 1723)  Verbalizes/displays adequate comfort level or baseline comfort level:   Encourage patient to monitor pain and request assistance   Assess pain using appropriate pain scale   Administer analgesics based on type and severity of pain and evaluate response   Implement non-pharmacological measures as appropriate and evaluate response     Problem: Skin/Tissue Integrity - Adult  Goal: Skin integrity remains intact  Outcome: Progressing  Flowsheets (Taken 7/4/2024 1723)  Skin Integrity Remains Intact: Monitor for areas of redness and/or skin breakdown     Problem: Skin/Tissue Integrity - Adult  Goal: Incisions, wounds, or drain sites healing without S/S of infection  Outcome: Progressing  Flowsheets

## 2024-07-04 NOTE — PROGRESS NOTES
This nurse walked in room to do bedside report with off going nurse, and patient was sitting in bed eating cheese popcorn and drinking a diet coke. Pt is non compliant with diet. Educated on importance of proper diet for wound healing. Pt denies need.

## 2024-07-04 NOTE — PROGRESS NOTES
PROGRESS NOTE      Patient:  Niya Blankenship  Unit/Bed:7K-12/012-A  YOB: 1950  MRN: [id number]   Acct: [id number]    PCP: Taylor Maza APRN - NP    Date of Admission: 7/1/2024 LOS: 3    Date of Evaluation:  7/4/2024    Anticipated Discharge: pending clinical course    Assessment/Plan:    Preop op eval:   Preop cardiac risk assessment: METs > 4 .  No prior  history of ischemic heart disease, CHF, CVA and diabetes.  Does not have history of LISA or wears CPAP at home.  Class 2 risk on RCRI.  6.0% 30-day risk of death, MI or cardiac arrest.  Possibly undergoing right heel partial calcanectomy and debridement,  intermediate risk surgery.  Considering risk versus benefits, patient amenable for surgery.     Osteomyelitis of right foot with leukocytosis  MRI right foot 7/2: Soft tissue defect in the plantar soft tissues over the calcaneus. Abnormal signal intensity in the calcaneus consistent with involvement by osteomyelitis. Abnormal signal intensity in the plantar soft tissues over the medial aspect   of the calcaneus consistent with inflammatory process. There was air in the soft tissues seen on the plain radiographs. Degenerative changes. Osteochondral defect in the talar dome. Spurring at the attachment of the Achilles tendon upon the calcaneus. Soft tissue swelling over the foot.   X-ray right foot 7/1: No fracture or dislocation   Podiatry following, continue to appreciate recommendations.   Plan for OR 7/5 for right heel partial calcanectomy and debridement of wound.  Continue daily dressing   MRSA negative   S/p vancomycin (7/1-7/3)  Continue Zosyn (7/1-7/7)  blood culture x 2 NG 48H  Heel Culture: Pseudomonas aeruginosa .  Leukocytosis improving. WBC 10.1 compared to 11.0 on 7/3  CRP 15.80  ID consulted and following and will continue IV Abx at this time .   Per chart review, Patient underwent bedside debridement on 03/27 then on 03/29 she underwent surgical debridement in the OR with Podiatry.  choices (60 gm/meal)  ADULT ORAL NUTRITION SUPPLEMENT; Lunch; Wound Healing Oral Supplement  Diet NPO    Microbiology: yes - reviewed  Antibiotics: yes - continue    Steroids: no    Telemetry: [x]Yes / []No  Telemetry Review of past 24 hours:  NSR with PACs    LDA: []CVC / []PICC / [x]Midline / []Sears / []Drains / []Mediport / []PIV / []None    Labs (still needed?): [x]Yes / []No  IVF (still needed?): []Yes / [x]No    Level of care: [x]Step Down / []Med-Surg  Bed Status: [x]Inpatient / []Observation    DVT Prophylaxis: [] Lovenox / [] Heparin / [x] SCDs / [] Already on Systemic Anticoagulation / [] None     PT/OT: []Yes / [x]No    Disposition:    [x] Home       [] TCU       [] Rehab       [] Psych       [] SNF       [] Long Term Care Facility       [] Other-    Code Status: DNR-CCA      An electronic signature was used to authenticate this note  - Josefina Loya MD PGY-2 on 7/4/2024 at 5:21 PM

## 2024-07-04 NOTE — PLAN OF CARE
Problem: Discharge Planning  Goal: Discharge to home or other facility with appropriate resources  Outcome: Progressing  Flowsheets (Taken 7/2/2024 2201 by Ema Vazquez, RN)  Discharge to home or other facility with appropriate resources:   Identify barriers to discharge with patient and caregiver   Identify discharge learning needs (meds, wound care, etc)   Refer to discharge planning if patient needs post-hospital services based on physician order or complex needs related to functional status, cognitive ability or social support system   Arrange for interpreters to assist at discharge as needed   Arrange for needed discharge resources and transportation as appropriate     Problem: Safety - Adult  Goal: Free from fall injury  Outcome: Progressing  Flowsheets (Taken 7/2/2024 2201 by Ema Vazquez, RN)  Free From Fall Injury:   Instruct family/caregiver on patient safety   Based on caregiver fall risk screen, instruct family/caregiver to ask for assistance with transferring infant if caregiver noted to have fall risk factors     Problem: Skin/Tissue Integrity  Goal: Absence of new skin breakdown  Description: 1.  Monitor for areas of redness and/or skin breakdown  2.  Assess vascular access sites hourly  3.  Every 4-6 hours minimum:  Change oxygen saturation probe site  4.  Every 4-6 hours:  If on nasal continuous positive airway pressure, respiratory therapy assess nares and determine need for appliance change or resting period.  Outcome: Progressing     Problem: Pain  Goal: Verbalizes/displays adequate comfort level or baseline comfort level  Outcome: Progressing  Flowsheets (Taken 7/2/2024 2201 by Ema Vazquez, RN)  Verbalizes/displays adequate comfort level or baseline comfort level:   Administer analgesics based on type and severity of pain and evaluate response   Consider cultural and social influences on pain and pain management   Assess pain using appropriate pain scale   Encourage patient to monitor  pain and request assistance   Implement non-pharmacological measures as appropriate and evaluate response   Notify Licensed Independent Practitioner if interventions unsuccessful or patient reports new pain     Problem: Skin/Tissue Integrity - Adult  Goal: Skin integrity remains intact  Outcome: Progressing  Flowsheets (Taken 7/2/2024 2201 by Ema Vazquez, RN)  Skin Integrity Remains Intact: Monitor for areas of redness and/or skin breakdown  Goal: Incisions, wounds, or drain sites healing without S/S of infection  Outcome: Progressing  Flowsheets (Taken 7/2/2024 1848 by Kesha Brannon LPN)  Incisions, Wounds, or Drain Sites Healing Without Sign and Symptoms of Infection: TWICE DAILY: Assess and document skin integrity     Problem: Chronic Conditions and Co-morbidities  Goal: Patient's chronic conditions and co-morbidity symptoms are monitored and maintained or improved  Outcome: Progressing  Flowsheets (Taken 7/2/2024 2201 by Ema Vazquez, RN)  Care Plan - Patient's Chronic Conditions and Co-Morbidity Symptoms are Monitored and Maintained or Improved:   Monitor and assess patient's chronic conditions and comorbid symptoms for stability, deterioration, or improvement   Collaborate with multidisciplinary team to address chronic and comorbid conditions and prevent exacerbation or deterioration   Update acute care plan with appropriate goals if chronic or comorbid symptoms are exacerbated and prevent overall improvement and discharge

## 2024-07-04 NOTE — PROCEDURES
PROCEDURE NOTE  Date: 7/4/2024   Name: Niya Blankenship  YOB: 1950    Procedures  12 lead EKG completed. Results handed to Kseha VICENTE. Joanne DIAZ

## 2024-07-05 ENCOUNTER — ANESTHESIA (OUTPATIENT)
Dept: OPERATING ROOM | Age: 74
End: 2024-07-05
Payer: COMMERCIAL

## 2024-07-05 ENCOUNTER — ANESTHESIA EVENT (OUTPATIENT)
Dept: OPERATING ROOM | Age: 74
End: 2024-07-05
Payer: COMMERCIAL

## 2024-07-05 ENCOUNTER — APPOINTMENT (OUTPATIENT)
Age: 74
DRG: 622 | End: 2024-07-05
Attending: FAMILY MEDICINE
Payer: COMMERCIAL

## 2024-07-05 LAB
ANION GAP SERPL CALC-SCNC: 12 MEQ/L (ref 8–16)
BASOPHILS ABSOLUTE: 0.1 THOU/MM3 (ref 0–0.1)
BASOPHILS NFR BLD AUTO: 0.6 %
BUN SERPL-MCNC: 7 MG/DL (ref 7–22)
CALCIUM SERPL-MCNC: 8.3 MG/DL (ref 8.5–10.5)
CHLORIDE SERPL-SCNC: 95 MEQ/L (ref 98–111)
CO2 SERPL-SCNC: 26 MEQ/L (ref 23–33)
CREAT SERPL-MCNC: 0.7 MG/DL (ref 0.4–1.2)
DEPRECATED RDW RBC AUTO: 46.4 FL (ref 35–45)
ECHO AV CUSP MM: 1.7 CM
ECHO LA DIAMETER INDEX: 1.9 CM/M2
ECHO LA DIAMETER: 3.1 CM
ECHO LV FRACTIONAL SHORTENING: 26 % (ref 28–44)
ECHO LV INTERNAL DIMENSION DIASTOLE INDEX: 2.64 CM/M2
ECHO LV INTERNAL DIMENSION DIASTOLIC: 4.3 CM (ref 3.9–5.3)
ECHO LV INTERNAL DIMENSION SYSTOLIC INDEX: 1.96 CM/M2
ECHO LV INTERNAL DIMENSION SYSTOLIC: 3.2 CM
ECHO LV IVSD: 0.9 CM (ref 0.6–0.9)
ECHO LV MASS 2D: 132.7 G (ref 67–162)
ECHO LV MASS INDEX 2D: 81.4 G/M2 (ref 43–95)
ECHO LV POSTERIOR WALL DIASTOLIC: 1 CM (ref 0.6–0.9)
ECHO LV RELATIVE WALL THICKNESS RATIO: 0.47
ECHO PV MAX VELOCITY: 0.9 M/S
ECHO PV PEAK GRADIENT: 3 MMHG
ECHO RV INTERNAL DIMENSION: 2.2 CM
EKG ATRIAL RATE: 84 BPM
EKG P AXIS: 69 DEGREES
EKG P-R INTERVAL: 120 MS
EKG Q-T INTERVAL: 380 MS
EKG QRS DURATION: 88 MS
EKG QTC CALCULATION (BAZETT): 449 MS
EKG R AXIS: 69 DEGREES
EKG T AXIS: 55 DEGREES
EKG VENTRICULAR RATE: 84 BPM
EOSINOPHIL NFR BLD AUTO: 3.8 %
EOSINOPHILS ABSOLUTE: 0.4 THOU/MM3 (ref 0–0.4)
ERYTHROCYTE [DISTWIDTH] IN BLOOD BY AUTOMATED COUNT: 15.1 % (ref 11.5–14.5)
GFR SERPL CREATININE-BSD FRML MDRD: 90 ML/MIN/1.73M2
GLUCOSE BLD STRIP.AUTO-MCNC: 200 MG/DL (ref 70–108)
GLUCOSE BLD STRIP.AUTO-MCNC: 230 MG/DL (ref 70–108)
GLUCOSE BLD STRIP.AUTO-MCNC: 245 MG/DL (ref 70–108)
GLUCOSE BLD STRIP.AUTO-MCNC: 439 MG/DL (ref 70–108)
GLUCOSE SERPL-MCNC: 234 MG/DL (ref 70–108)
HCT VFR BLD AUTO: 27 % (ref 37–47)
HGB BLD-MCNC: 8.4 GM/DL (ref 12–16)
IMM GRANULOCYTES # BLD AUTO: 0.09 THOU/MM3 (ref 0–0.07)
IMM GRANULOCYTES NFR BLD AUTO: 1 %
LYMPHOCYTES ABSOLUTE: 1.6 THOU/MM3 (ref 1–4.8)
LYMPHOCYTES NFR BLD AUTO: 16.7 %
MCH RBC QN AUTO: 25.8 PG (ref 26–33)
MCHC RBC AUTO-ENTMCNC: 31.1 GM/DL (ref 32.2–35.5)
MCV RBC AUTO: 83.1 FL (ref 81–99)
MONOCYTES ABSOLUTE: 0.8 THOU/MM3 (ref 0.4–1.3)
MONOCYTES NFR BLD AUTO: 8.5 %
NEUTROPHILS ABSOLUTE: 6.5 THOU/MM3 (ref 1.8–7.7)
NEUTROPHILS NFR BLD AUTO: 69.4 %
NRBC BLD AUTO-RTO: 0 /100 WBC
PLATELET # BLD AUTO: 474 THOU/MM3 (ref 130–400)
PMV BLD AUTO: 10.3 FL (ref 9.4–12.4)
POTASSIUM SERPL-SCNC: 3.4 MEQ/L (ref 3.5–5.2)
RBC # BLD AUTO: 3.25 MILL/MM3 (ref 4.2–5.4)
SODIUM SERPL-SCNC: 133 MEQ/L (ref 135–145)
WBC # BLD AUTO: 9.4 THOU/MM3 (ref 4.8–10.8)

## 2024-07-05 PROCEDURE — 93308 TTE F-UP OR LMTD: CPT

## 2024-07-05 PROCEDURE — 6370000000 HC RX 637 (ALT 250 FOR IP)

## 2024-07-05 PROCEDURE — 82948 REAGENT STRIP/BLOOD GLUCOSE: CPT

## 2024-07-05 PROCEDURE — 2580000003 HC RX 258

## 2024-07-05 PROCEDURE — 6360000002 HC RX W HCPCS: Performed by: PODIATRIST

## 2024-07-05 PROCEDURE — 85025 COMPLETE CBC W/AUTO DIFF WBC: CPT

## 2024-07-05 PROCEDURE — 3700000001 HC ADD 15 MINUTES (ANESTHESIA): Performed by: PODIATRIST

## 2024-07-05 PROCEDURE — 87205 SMEAR GRAM STAIN: CPT

## 2024-07-05 PROCEDURE — 93325 DOPPLER ECHO COLOR FLOW MAPG: CPT | Performed by: INTERNAL MEDICINE

## 2024-07-05 PROCEDURE — 6360000002 HC RX W HCPCS

## 2024-07-05 PROCEDURE — 87070 CULTURE OTHR SPECIMN AEROBIC: CPT

## 2024-07-05 PROCEDURE — 7100000000 HC PACU RECOVERY - FIRST 15 MIN: Performed by: PODIATRIST

## 2024-07-05 PROCEDURE — 99232 SBSQ HOSP IP/OBS MODERATE 35: CPT | Performed by: FAMILY MEDICINE

## 2024-07-05 PROCEDURE — 87075 CULTR BACTERIA EXCEPT BLOOD: CPT

## 2024-07-05 PROCEDURE — 87186 SC STD MICRODIL/AGAR DIL: CPT

## 2024-07-05 PROCEDURE — 88311 DECALCIFY TISSUE: CPT

## 2024-07-05 PROCEDURE — 36415 COLL VENOUS BLD VENIPUNCTURE: CPT

## 2024-07-05 PROCEDURE — 93010 ELECTROCARDIOGRAM REPORT: CPT | Performed by: INTERNAL MEDICINE

## 2024-07-05 PROCEDURE — 80048 BASIC METABOLIC PNL TOTAL CA: CPT

## 2024-07-05 PROCEDURE — 0QBL0ZZ EXCISION OF RIGHT TARSAL, OPEN APPROACH: ICD-10-PCS | Performed by: PODIATRIST

## 2024-07-05 PROCEDURE — 93005 ELECTROCARDIOGRAM TRACING: CPT | Performed by: FAMILY MEDICINE

## 2024-07-05 PROCEDURE — 1200000000 HC SEMI PRIVATE

## 2024-07-05 PROCEDURE — 2709999900 HC NON-CHARGEABLE SUPPLY: Performed by: PODIATRIST

## 2024-07-05 PROCEDURE — 2500000003 HC RX 250 WO HCPCS: Performed by: NURSE ANESTHETIST, CERTIFIED REGISTERED

## 2024-07-05 PROCEDURE — 7100000001 HC PACU RECOVERY - ADDTL 15 MIN: Performed by: PODIATRIST

## 2024-07-05 PROCEDURE — 6360000002 HC RX W HCPCS: Performed by: NURSE ANESTHETIST, CERTIFIED REGISTERED

## 2024-07-05 PROCEDURE — 0JBQ0ZZ EXCISION OF RIGHT FOOT SUBCUTANEOUS TISSUE AND FASCIA, OPEN APPROACH: ICD-10-PCS | Performed by: PODIATRIST

## 2024-07-05 PROCEDURE — 3600000003 HC SURGERY LEVEL 3 BASE: Performed by: PODIATRIST

## 2024-07-05 PROCEDURE — 93308 TTE F-UP OR LMTD: CPT | Performed by: INTERNAL MEDICINE

## 2024-07-05 PROCEDURE — 93321 DOPPLER ECHO F-UP/LMTD STD: CPT

## 2024-07-05 PROCEDURE — 93321 DOPPLER ECHO F-UP/LMTD STD: CPT | Performed by: INTERNAL MEDICINE

## 2024-07-05 PROCEDURE — 88305 TISSUE EXAM BY PATHOLOGIST: CPT

## 2024-07-05 PROCEDURE — 3600000013 HC SURGERY LEVEL 3 ADDTL 15MIN: Performed by: PODIATRIST

## 2024-07-05 PROCEDURE — 1200000003 HC TELEMETRY R&B

## 2024-07-05 PROCEDURE — 6370000000 HC RX 637 (ALT 250 FOR IP): Performed by: FAMILY MEDICINE

## 2024-07-05 PROCEDURE — 3700000000 HC ANESTHESIA ATTENDED CARE: Performed by: PODIATRIST

## 2024-07-05 PROCEDURE — 87077 CULTURE AEROBIC IDENTIFY: CPT

## 2024-07-05 PROCEDURE — 2720000010 HC SURG SUPPLY STERILE: Performed by: PODIATRIST

## 2024-07-05 DEVICE — POWDER SURG CELLERATE RX 1 GM HYDROL COLLEGEN: Type: IMPLANTABLE DEVICE | Status: FUNCTIONAL

## 2024-07-05 RX ORDER — ROCURONIUM BROMIDE 10 MG/ML
INJECTION, SOLUTION INTRAVENOUS PRN
Status: DISCONTINUED | OUTPATIENT
Start: 2024-07-05 | End: 2024-07-05 | Stop reason: SDUPTHER

## 2024-07-05 RX ORDER — PROPOFOL 10 MG/ML
INJECTION, EMULSION INTRAVENOUS PRN
Status: DISCONTINUED | OUTPATIENT
Start: 2024-07-05 | End: 2024-07-05 | Stop reason: SDUPTHER

## 2024-07-05 RX ORDER — DEXAMETHASONE SODIUM PHOSPHATE 10 MG/ML
INJECTION, EMULSION INTRAMUSCULAR; INTRAVENOUS PRN
Status: DISCONTINUED | OUTPATIENT
Start: 2024-07-05 | End: 2024-07-05 | Stop reason: SDUPTHER

## 2024-07-05 RX ORDER — BUPIVACAINE HYDROCHLORIDE 5 MG/ML
INJECTION, SOLUTION PERINEURAL PRN
Status: DISCONTINUED | OUTPATIENT
Start: 2024-07-05 | End: 2024-07-05 | Stop reason: ALTCHOICE

## 2024-07-05 RX ORDER — PHENYLEPHRINE HCL IN 0.9% NACL 1 MG/10 ML
SYRINGE (ML) INTRAVENOUS PRN
Status: DISCONTINUED | OUTPATIENT
Start: 2024-07-05 | End: 2024-07-05 | Stop reason: SDUPTHER

## 2024-07-05 RX ORDER — LIDOCAINE HCL/PF 100 MG/5ML
SYRINGE (ML) INJECTION PRN
Status: DISCONTINUED | OUTPATIENT
Start: 2024-07-05 | End: 2024-07-05 | Stop reason: SDUPTHER

## 2024-07-05 RX ORDER — GLYCOPYRROLATE 0.2 MG/ML
INJECTION INTRAMUSCULAR; INTRAVENOUS PRN
Status: DISCONTINUED | OUTPATIENT
Start: 2024-07-05 | End: 2024-07-05 | Stop reason: SDUPTHER

## 2024-07-05 RX ORDER — FENTANYL CITRATE 50 UG/ML
INJECTION, SOLUTION INTRAMUSCULAR; INTRAVENOUS PRN
Status: DISCONTINUED | OUTPATIENT
Start: 2024-07-05 | End: 2024-07-05 | Stop reason: SDUPTHER

## 2024-07-05 RX ORDER — ONDANSETRON 2 MG/ML
INJECTION INTRAMUSCULAR; INTRAVENOUS PRN
Status: DISCONTINUED | OUTPATIENT
Start: 2024-07-05 | End: 2024-07-05 | Stop reason: SDUPTHER

## 2024-07-05 RX ORDER — LISINOPRIL 40 MG/1
40 TABLET ORAL DAILY
Status: DISCONTINUED | OUTPATIENT
Start: 2024-07-05 | End: 2024-07-17 | Stop reason: HOSPADM

## 2024-07-05 RX ORDER — VANCOMYCIN HYDROCHLORIDE 1 G/20ML
INJECTION, POWDER, LYOPHILIZED, FOR SOLUTION INTRAVENOUS PRN
Status: DISCONTINUED | OUTPATIENT
Start: 2024-07-05 | End: 2024-07-05 | Stop reason: ALTCHOICE

## 2024-07-05 RX ADMIN — ONDANSETRON 4 MG: 2 INJECTION INTRAMUSCULAR; INTRAVENOUS at 14:12

## 2024-07-05 RX ADMIN — INSULIN LISPRO 2 UNITS: 100 INJECTION, SOLUTION INTRAVENOUS; SUBCUTANEOUS at 18:27

## 2024-07-05 RX ADMIN — Medication 200 MCG: at 14:18

## 2024-07-05 RX ADMIN — Medication 60 MG: at 13:35

## 2024-07-05 RX ADMIN — HYDROXYZINE PAMOATE 25 MG: 25 CAPSULE ORAL at 10:59

## 2024-07-05 RX ADMIN — GLYCOPYRROLATE 0.4 MG: 0.2 INJECTION INTRAMUSCULAR; INTRAVENOUS at 13:57

## 2024-07-05 RX ADMIN — LISINOPRIL 40 MG: 40 TABLET ORAL at 08:47

## 2024-07-05 RX ADMIN — FENTANYL CITRATE 50 MCG: 50 INJECTION, SOLUTION INTRAMUSCULAR; INTRAVENOUS at 13:32

## 2024-07-05 RX ADMIN — Medication 200 MCG: at 14:36

## 2024-07-05 RX ADMIN — INSULIN LISPRO 4 UNITS: 100 INJECTION, SOLUTION INTRAVENOUS; SUBCUTANEOUS at 21:41

## 2024-07-05 RX ADMIN — PIPERACILLIN SODIUM AND TAZOBACTAM SODIUM 3375 MG: 3; .375 INJECTION, POWDER, LYOPHILIZED, FOR SOLUTION INTRAVENOUS at 01:01

## 2024-07-05 RX ADMIN — POTASSIUM BICARBONATE 40 MEQ: 782 TABLET, EFFERVESCENT ORAL at 06:08

## 2024-07-05 RX ADMIN — Medication 200 MCG: at 14:44

## 2024-07-05 RX ADMIN — Medication 200 MCG: at 14:05

## 2024-07-05 RX ADMIN — DEXAMETHASONE SODIUM PHOSPHATE 4 MG: 10 INJECTION, EMULSION INTRAMUSCULAR; INTRAVENOUS at 13:50

## 2024-07-05 RX ADMIN — Medication 200 MCG: at 14:11

## 2024-07-05 RX ADMIN — SUGAMMADEX 200 MG: 100 INJECTION, SOLUTION INTRAVENOUS at 14:15

## 2024-07-05 RX ADMIN — PROPOFOL 60 MG: 10 INJECTION, EMULSION INTRAVENOUS at 13:50

## 2024-07-05 RX ADMIN — FENTANYL CITRATE 25 MCG: 50 INJECTION, SOLUTION INTRAMUSCULAR; INTRAVENOUS at 15:02

## 2024-07-05 RX ADMIN — SODIUM CHLORIDE: 9 INJECTION, SOLUTION INTRAVENOUS at 13:15

## 2024-07-05 RX ADMIN — SODIUM CHLORIDE: 9 INJECTION, SOLUTION INTRAVENOUS at 15:15

## 2024-07-05 RX ADMIN — PROPOFOL 100 MG: 10 INJECTION, EMULSION INTRAVENOUS at 13:36

## 2024-07-05 RX ADMIN — ROCURONIUM BROMIDE 30 MG: 10 INJECTION INTRAVENOUS at 13:36

## 2024-07-05 RX ADMIN — PIPERACILLIN AND TAZOBACTAM 3375 MG: 3; .375 INJECTION, POWDER, FOR SOLUTION INTRAVENOUS at 13:46

## 2024-07-05 RX ADMIN — INSULIN GLARGINE 7 UNITS: 100 INJECTION, SOLUTION SUBCUTANEOUS at 21:41

## 2024-07-05 RX ADMIN — FENTANYL CITRATE 25 MCG: 50 INJECTION, SOLUTION INTRAMUSCULAR; INTRAVENOUS at 15:20

## 2024-07-05 RX ADMIN — PIPERACILLIN AND TAZOBACTAM 3375 MG: 3; .375 INJECTION, POWDER, FOR SOLUTION INTRAVENOUS at 19:37

## 2024-07-05 NOTE — CARE COORDINATION
7/5/24, 8:11 AM EDT    DISCHARGE ON GOING EVALUATION    Niya Blankenship       [facility] day: 4  Location: 7-12/012-A Reason for admit: Type 2 diabetes mellitus with right diabetic foot ulcer (HCC) [E11.621, L97.519]  Anemia, unspecified type [D64.9]  Leukocytosis, unspecified type [D72.829]  Non-healing open wound of heel, right, initial encounter [S91.301A]     Procedures:   7/3 Power glide insertion IV to DARION  planning OR 7/5  right heel partial calcanectomy and debridement of wound.    Imaging since last note: na    Barriers to Discharge: NPO after MN, IVF, consent for OR. Hospitalist, ID, and podiatry.    PCP: Taylor Maza APRN - NP  Readmission Risk Score: 12.2    Patient Goals/Plan/Treatment Preferences:   Niya is from home with daughter and S-I-L; pt wheelchair bound and agrees to HH. Will follow post op.

## 2024-07-05 NOTE — PROGRESS NOTES
1522 Patient arrives to PACU, responds to voice. Resp easy and unlabored on room air. Wound vac on R heel. Pt denies pain and nausea at this time. VSS    1525 Pt continues to rest in bed, dozing on and off. Resp easy and unlabored on room air, VSS    1538 Report called to Abbey Perkins.     1540 Pt continues to rest in bed with eyes closed, resp easy and unlabored, VSS    1550 Pt resting in bed, dozing on and off. Resp easy and unlabored. VSS    1552  Pt meets criteria for discharge from PACU at this time.     1555 Spo2 ranging 89-91% on room air, 2 L NC applied.     1605 Pt resting in bed, awaiting transport. VSS    1607 Pt to 7K in stable condition

## 2024-07-05 NOTE — PROGRESS NOTES
Comprehensive Nutrition Assessment    Type and Reason for Visit:  Initial, Wound, Consult    Nutrition Recommendations/Plan:   Once PO diet restarted s/p surgery today - initiate ONS: Cody (BID) and Glucerna (BID)   Recommend MVI to assist with wound healing efforts  Will monitor need for additional nutrition interventions as appropriate and able.      Malnutrition Assessment:  Malnutrition Status:  At risk for malnutrition (Comment) (07/05/24 1329)    Context:  Acute Illness     Findings of the 6 clinical characteristics of malnutrition:  Energy Intake:  Mild decrease in energy intake (Comment) (with NPO status; variable PO intakes this admit)  Weight Loss:   (unable to accurately assess r/t stated weight this admission)     Body Fat Loss:  No significant body fat loss     Muscle Mass Loss:  No significant muscle mass loss    Fluid Accumulation:  No significant fluid accumulation     Strength:  Not Performed    Nutrition Assessment:     Pt. nutritionally compromised AEB wounds.  At risk for further nutrition compromise r/t admit with OM of R foot with leukocytosis, acute on chronic metabolic encephalopathy, increased nutrient needs for wound healing, underlying medical condition (PMHx: T2DM, HTN, depression/anxiety).       Nutrition Related Findings:    Pt. Report/Treatments/Miscellaneous: Pt seen, family present at time of visit. Pt spoke to writer with eyes closed and c/o not getting fed (NPO at present for surgery). Discussed diet initiation s/p surgery and ONS.   GI Status: BM - 7/4  Pertinent Labs: Na 133, K 3.4, glucose 234, last A1C 6.8% (7/3)  Pertinent Meds: lexapro, lantus, humalog, zosyn     Wound Type:  (R foot diabetic wound - I&D 7/5)       Current Nutrition Intake & Therapies:    Average Meal Intake:  (variable intakes)  Average Supplements Intake:  (initiated today)  Diet NPO    Anthropometric Measures:  Height: 162.6 cm (5' 4\")  Ideal Body Weight (IBW): 120 lbs (55 kg)    Admission Body

## 2024-07-05 NOTE — PROGRESS NOTES
Progress note: Infectious diseases    Patient - Niya Blankenship,  Age - 74 y.o.    - 1950      Room Number - 7K-12/012-A   MRN -  [id number]   [facility] # - [id number]  Date of Admission -  2024  1:19 PM    SUBJECTIVE:   No new issues  She still doesn't want amputation  OBJECTIVE   VITALS    height is 1.626 m (5' 4\") and weight is 59 kg (130 lb). Her oral temperature is 98.4 °F (36.9 °C). Her blood pressure is 155/81 (abnormal) and her pulse is 86. Her respiration is 18 and oxygen saturation is 95%.       Wt Readings from Last 3 Encounters:   24 59 kg (130 lb)   21 70.3 kg (155 lb)       I/O (24 Hours)    Intake/Output Summary (Last 24 hours) at 2024 1123  Last data filed at 2024 0849  Gross per 24 hour   Intake 540 ml   Output --   Net 540 ml       General Appearance  Awake, alert, oriented,  not  In acute distress  HEENT - normocephalic, atraumatic, pale  conjunctiva,  anicteric sclera  Neck - Supple, no mass  Lungs -  Bilateral  air entry, no rhonchi, no wheeze  Cardiovascular - Heart sounds are normal.  Regular rate and rhythm without murmur, gallop or rub.  Abdomen - soft, not distended, nontender,   Neurologic -oriented  Skin - No bruising or bleeding  Extremities -  dressed right heel wound    MEDICATIONS:      piperacillin-tazobactam  3,375 mg IntraVENous On Call to OR    piperacillin-tazobactam  3,375 mg IntraVENous Q8H    lisinopril  40 mg Oral Daily    insulin glargine  7 Units SubCUTAneous Nightly    potassium chloride  40 mEq Oral Once    sodium chloride flush  5-40 mL IntraVENous 2 times per day    [Held by provider] enoxaparin  40 mg SubCUTAneous Daily    insulin lispro  0-8 Units SubCUTAneous TID     insulin lispro  0-4 Units SubCUTAneous Nightly    escitalopram  10 mg Oral Daily      sodium chloride      sodium chloride      dextrose       hydrOXYzine pamoate, hydrALAZINE, sodium  chloride, sodium chloride flush, sodium chloride, potassium chloride **OR** potassium alternative oral replacement **OR** potassium chloride, ondansetron **OR** ondansetron, polyethylene glycol, acetaminophen **OR** acetaminophen, zinc oxide, glucose, dextrose bolus **OR** dextrose bolus, glucagon (rDNA), dextrose      LABS:     CBC:   Recent Labs     07/03/24  0607 07/03/24  1131 07/04/24  0728 07/04/24  1134 07/05/24  0502   WBC 11.0*  --  10.1  --  9.4   HGB 7.6*   < > 7.7* 8.2* 8.4*   *  --  444*  --  474*    < > = values in this interval not displayed.     BMP:    Recent Labs     07/03/24  0607 07/04/24  0728 07/05/24  0502   * 135 133*   K 3.3* 3.8 3.4*    100 95*   CO2 22* 26 26   BUN 12 9 7   CREATININE 0.8 0.8 0.7   GLUCOSE 166* 229* 234*     Calcium:  Recent Labs     07/05/24  0502   CALCIUM 8.3*     Ionized Calcium:Invalid input(s): \"IONCA\"  Magnesium:No results for input(s): \"MG\" in the last 72 hours.  Phosphorus:No results for input(s): \"PHOS\" in the last 72 hours.  BNP:No results for input(s): \"BNP\" in the last 72 hours.  Glucose:  Recent Labs     07/04/24 2005 07/05/24  0620 07/05/24  1051   POCGLU 287* 230* 200*     HgbA1C:   Recent Labs     07/03/24  0607   LABA1C 6.8*     INR: No results for input(s): \"INR\" in the last 72 hours.  Hepatic: No results for input(s): \"ALKPHOS\", \"ALT\", \"AST\", \"BILITOT\", \"BILIDIR\", \"LABALBU\" in the last 72 hours.    Invalid input(s): \"PROT\"  Amylase and Lipase:No results for input(s): \"LACTA\", \"AMYLASE\" in the last 72 hours.  Lactic Acid: No results for input(s): \"LACTA\" in the last 72 hours.  Troponin: No results for input(s): \"CKTOTAL\", \"CKMB\", \"TROPONINI\" in the last 72 hours.  BNP: No results for input(s): \"BNP\" in the last 72 hours.    CULTURES:   UA: No results for input(s): \"SPECGRAV\", \"PHUR\", \"COLORU\", \"CLARITYU\", \"MUCUS\", \"PROTEINU\", \"BLOODU\", \"RBCUA\", \"WBCUA\", \"BACTERIA\", \"NITRU\", \"GLUCOSEU\", \"BILIRUBINUR\", \"UROBILINOGEN\", \"KETUA\",

## 2024-07-05 NOTE — BRIEF OP NOTE
Brief Postoperative Note      Patient: Niya Blankenship  YOB: 1950  MRN: [id number]    Date of Procedure: 7/5/2024    Pre-Op Diagnosis Codes:     * Ulcer of foot, unspecified laterality, unspecified ulcer stage (HCC) [L97.509]    Post-Op Diagnosis: Same       Procedure(s):  -Excisional debridement of wound, right heel, 12 x 7 cm  -Partial calcanectomy, right  -Application of wound VAC, right foot    Surgeon(s):  Cb Phelan DPM    Assistant:  Resident: Willy Campos DPM; Michael Mccoy DPM  Student: Dustin Olson, MS4    Anesthesia: General    Estimated Blood Loss (mL): Minimal    Complications: None    Specimens:   ID Type Source Tests Collected by Time Destination   1 :  Bone Foot CULTURE, ANAEROBIC AND AEROBIC bC Phelan DPM 7/5/2024 1410    A :  Bone Foot SURGICAL PATHOLOGY Cb Phelan DPM 7/5/2024 1427        Implants:  Implant Name Type Inv. Item Serial No.  Lot No. LRB No. Used Action   POWDER SURG CELLERATE RX 1 [facility] - [id number]  POWDER SURG CELLERATE RX 1 [facility]  [employer]  Right 1 Implanted         Drains: * No LDAs found *    Findings:  Infection Present At Time Of Surgery (PATOS) (choose all levels that have infection present):  - Deep Infection (muscle/fascia) present as evidenced by purulent fluid and fluid consistent with infection  Other Findings: Consistent with pre-op    Hemostasis: N/A    Injectables: 20 cc of 0.5% Marcaine plain, 10cc of 1:1 mix of normal saline and 0.5gm Cellerate powder     Materials: 3-0 Nylon, 1gm vancomycin powder, 1gm Cellerate powder, wound vac      Electronically signed by Willy Campos DPM on 7/5/2024 at 3:16 PM

## 2024-07-05 NOTE — ANESTHESIA POSTPROCEDURE EVALUATION
Department of Anesthesiology  Postprocedure Note    Patient: Niya Blankenship  MRN: [id number]  YOB: 1950  Date of evaluation: 7/5/2024    Procedure Summary       Date: 07/05/24 Room / Location: [facility] OR [address] [place] [address] OR    Anesthesia Start: 1328 Anesthesia Stop: 1524    Procedure: Debridement of Right Heel and Partial Calcanectomy (Right) Diagnosis:       Ulcer of foot, unspecified laterality, unspecified ulcer stage (HCC)      (Ulcer of foot, unspecified laterality, unspecified ulcer stage (HCC) [L97.509])    Surgeons: Cb Phelan DPM Responsible Provider: Al Knight MD    Anesthesia Type: general ASA Status: 3            Anesthesia Type: No value filed.    Vaibhav Phase I: Vaibhav Score: 8    Vaibhav Phase II:      Anesthesia Post Evaluation    Patient location during evaluation: PACU  Patient participation: complete - patient participated  Level of consciousness: awake and alert  Airway patency: patent  Nausea & Vomiting: no nausea  Cardiovascular status: blood pressure returned to baseline and hemodynamically stable  Respiratory status: acceptable and spontaneous ventilation  Hydration status: euvolemic  Pain management: adequate    No notable events documented.

## 2024-07-05 NOTE — OP NOTE
Operative Note    Patient: Niya Blankenship  YOB: 1950  MRN: [id number]    Date of Procedure: 7/5/2024    Pre-Op Diagnosis Codes:     * Ulcer of foot, unspecified laterality, unspecified ulcer stage ([facility]) [L97.509]    Post-Op Diagnosis: Same       Procedure(s):  -Excisional debridement of wound, right heel, 12 x 7 cm  -Partial calcanectomy, right  -Application of wound VAC, right foot    Surgeon(s):  Cb Phelan DPM    Assistant:  Resident: Willy Campos DPM; Michael Mccoy DPM  Student: Dustin Olson, MS4    Anesthesia: General    Estimated Blood Loss (mL): Minimal    Complications: None    Specimens:   ID Type Source Tests Collected by Time Destination   1 :  Bone Foot CULTURE, ANAEROBIC AND AEROBIC Cb Phelan DPM 7/5/2024 1410    A :  Bone Foot SURGICAL PATHOLOGY PhelanCb DPM 7/5/2024 1427        Implants:  Implant Name Type Inv. Item Serial No.  Lot No. LRB No. Used Action   POWDER SURG CELLERATE RX 1 [facility] - [id number]  POWDER SURG CELLERATE RX 1 [facility]  [employer]  Right 1 Implanted         Drains: * No LDAs found *    Findings:  Infection Present At Time Of Surgery (PATOS) (choose all levels that have infection present):  - Deep Infection (muscle/fascia) present as evidenced by purulent fluid and fluid consistent with infection  Other Findings: Consistent with pre-op    Hemostasis: N/A    Injectables: 20 cc of 0.5% Marcaine plain, 10cc of 1:1 mix of normal saline and 0.5gm Cellerate powder     Materials: 3-0 Nylon, 1gm vancomycin powder, 1gm Cellerate powder, wound vac    Detailed Description of Procedure:     Indications: Patient is a 74 y.o. female with a chief complaint of nonhealing right heel ulcer with exposed calcaneus who is being seen by Dr. Phelan and being treated for this issue.  At this time all conservative options have been exhausted and surgical intervention is necessary.  The procedure has been explained to the

## 2024-07-05 NOTE — PLAN OF CARE
Problem: Discharge Planning  Goal: Discharge to home or other facility with appropriate resources  7/4/2024 2231 by Saskia Yanes RN  Outcome: Progressing  Flowsheets (Taken 7/4/2024 2134)  Discharge to home or other facility with appropriate resources: Identify barriers to discharge with patient and caregiver  7/4/2024 1723 by Kesha Brannon LPN  Outcome: Progressing  Flowsheets (Taken 7/4/2024 1723)  Discharge to home or other facility with appropriate resources:   Identify barriers to discharge with patient and caregiver   Arrange for needed discharge resources and transportation as appropriate   Identify discharge learning needs (meds, wound care, etc)     Problem: Safety - Adult  Goal: Free from fall injury  7/4/2024 2231 by Saskia Yanes RN  Outcome: Progressing  Flowsheets (Taken 7/4/2024 1723 by Kesha Brannon LPN)  Free From Fall Injury: Instruct family/caregiver on patient safety  7/4/2024 1723 by Kesha Brannon LPN  Outcome: Progressing  Flowsheets (Taken 7/4/2024 1723)  Free From Fall Injury: Instruct family/caregiver on patient safety     Problem: Skin/Tissue Integrity  Goal: Absence of new skin breakdown  Description: 1.  Monitor for areas of redness and/or skin breakdown  2.  Assess vascular access sites hourly  3.  Every 4-6 hours minimum:  Change oxygen saturation probe site  4.  Every 4-6 hours:  If on nasal continuous positive airway pressure, respiratory therapy assess nares and determine need for appliance change or resting period.  7/4/2024 1723 by Kesha Brannon LPN  Outcome: Progressing  Note: No new skin breakdown noted at this time this shift.     Problem: Pain  Goal: Verbalizes/displays adequate comfort level or baseline comfort level  7/4/2024 2231 by Saskia Yanes RN  Outcome: Progressing  Flowsheets (Taken 7/4/2024 1723 by Kesha Brannon LPN)  Verbalizes/displays adequate comfort level or baseline comfort level:   Encourage patient to monitor pain and request  assistance   Assess pain using appropriate pain scale   Administer analgesics based on type and severity of pain and evaluate response   Implement non-pharmacological measures as appropriate and evaluate response  7/4/2024 1723 by Kesha Brannon LPN  Outcome: Progressing  Flowsheets (Taken 7/4/2024 1723)  Verbalizes/displays adequate comfort level or baseline comfort level:   Encourage patient to monitor pain and request assistance   Assess pain using appropriate pain scale   Administer analgesics based on type and severity of pain and evaluate response   Implement non-pharmacological measures as appropriate and evaluate response     Problem: Skin/Tissue Integrity - Adult  Goal: Skin integrity remains intact  7/4/2024 2231 by Saskia Yanes RN  Outcome: Progressing  Flowsheets (Taken 7/4/2024 2134)  Skin Integrity Remains Intact: Monitor for areas of redness and/or skin breakdown  7/4/2024 1723 by Kesha Brannon LPN  Outcome: Progressing  Flowsheets (Taken 7/4/2024 1723)  Skin Integrity Remains Intact: Monitor for areas of redness and/or skin breakdown  Goal: Incisions, wounds, or drain sites healing without S/S of infection  7/4/2024 2231 by Saskia Yanes RN  Outcome: Progressing  Flowsheets (Taken 7/4/2024 2134)  Incisions, Wounds, or Drain Sites Healing Without Sign and Symptoms of Infection: TWICE DAILY: Assess and document skin integrity  7/4/2024 1723 by Kesha Brannon LPN  Outcome: Progressing  Flowsheets (Taken 7/4/2024 1723)  Incisions, Wounds, or Drain Sites Healing Without Sign and Symptoms of Infection: TWICE DAILY: Assess and document skin integrity     Problem: Chronic Conditions and Co-morbidities  Goal: Patient's chronic conditions and co-morbidity symptoms are monitored and maintained or improved  7/4/2024 2231 by Saskia Yanes RN  Outcome: Progressing  Flowsheets (Taken 7/4/2024 2134)  Care Plan - Patient's Chronic Conditions and Co-Morbidity Symptoms are Monitored and Maintained or

## 2024-07-05 NOTE — PROGRESS NOTES
[facility] Nurse  Progress Note       NAME:  Niya Blankenship  MEDICAL RECORD NUMBER:  [id number]  AGE: 74 y.o.   GENDER: female  : 1950  TODAY'S DATE:  2024    Subjective   Reason for WOCN Evaluation and Assessment: non blanchable redness to buttocks       Niya Blankenship is a 74 y.o. female referred by:   [] Physician/ PA/ APRN-CNP  [x] Nursing  [] Other:     Plan     Consult received for non blanchable redness to buttocks. Primary RN to assess wounds and document findings in LDA. Awaiting RN documentation.

## 2024-07-05 NOTE — PROCEDURES
PROCEDURE NOTE  Date: 7/5/2024   Name: Niya Blankenship  YOB: 1950    Procedures  12 lead EKG completed. Results handed to Jose F NOLAND.

## 2024-07-05 NOTE — ANESTHESIA PRE PROCEDURE
BMI:   Wt Readings from Last 3 Encounters:   07/01/24 59 kg (130 lb)   04/01/21 70.3 kg (155 lb)     Body mass index is 22.31 kg/m².    CBC:   Lab Results   Component Value Date/Time    WBC 9.4 07/05/2024 05:02 AM    RBC 3.25 07/05/2024 05:02 AM    HGB 8.4 07/05/2024 05:02 AM    HCT 27.0 07/05/2024 05:02 AM    MCV 83.1 07/05/2024 05:02 AM     07/05/2024 05:02 AM       CMP:   Lab Results   Component Value Date/Time     07/05/2024 05:02 AM    K 3.4 07/05/2024 05:02 AM    K 3.1 07/02/2024 05:51 AM    CL 95 07/05/2024 05:02 AM    CO2 26 07/05/2024 05:02 AM    BUN 7 07/05/2024 05:02 AM    CREATININE 0.7 07/05/2024 05:02 AM    LABGLOM 90 07/05/2024 05:02 AM    LABGLOM 49 07/30/2022 07:30 PM    GLUCOSE 234 07/05/2024 05:02 AM    CALCIUM 8.3 07/05/2024 05:02 AM    BILITOT 0.3 07/30/2022 07:30 PM    ALKPHOS 119 07/30/2022 07:30 PM    AST 13 07/30/2022 07:30 PM    ALT 17 07/30/2022 07:30 PM       POC Tests:   Recent Labs     07/05/24  1051   POCGLU 200*       Coags:   Lab Results   Component Value Date/Time    INR 1.08 06/26/2021 07:03 PM       HCG (If Applicable): No results found for: \"PREGTESTUR\", \"PREGSERUM\", \"HCG\", \"HCGQUANT\"     ABGs: No results found for: \"PHART\", \"PO2ART\", \"VBC7YBP\", \"RWH5TAZ\", \"BEART\", \"I9QADNXE\"     Type & Screen (If Applicable):  No results found for: \"LABABO\"    Drug/Infectious Status (If Applicable):  No results found for: \"HIV\", \"HEPCAB\"    COVID-19 Screening (If Applicable): No results found for: \"COVID19\"        Anesthesia Evaluation     no history of anesthetic complications:   Airway: Mallampati: II  TM distance: >3 FB   Neck ROM: full  Mouth opening: > = 3 FB   Dental:          Pulmonary:normal exam              Patient did not smoke on day of surgery.                 Cardiovascular:  Exercise tolerance: good (>4 METS)                    Neuro/Psych:   Negative Neuro/Psych ROS              GI/Hepatic/Renal:             Endo/Other:

## 2024-07-05 NOTE — H&P
[facility]  History and Physical Update    Pt Name: Niya Blankenship  MRN: [id number]  YOB: 1950  Date of evaluation: 7/5/2024    I have examined the patient and reviewed the H&P/Consult and there are no changes to the patient or plans.      Cb Phelan DPM,FACFAS  Electronically signed 7/5/2024 at 1:28 PM

## 2024-07-05 NOTE — PROGRESS NOTES
PROGRESS NOTE      Patient:  Niya Blankenship  Unit/Bed:[facility] OR (General) POOL R*  YOB: 1950  MRN: [id number]   Acct: [id number]    PCP: Taylor Maza APRN - NP    Date of Admission: 7/1/2024 LOS: 4    Date of Evaluation:  7/5/2024    Anticipated Discharge: pending clinical course    Assessment/Plan:    Osteomyelitis of right foot with leukocytosis  MRI right foot 7/2: Soft tissue defect in the plantar soft tissues over the calcaneus. Abnormal signal intensity in the calcaneus consistent with involvement by osteomyelitis. Abnormal signal intensity in the plantar soft tissues over the medial aspect   of the calcaneus consistent with inflammatory process. There was air in the soft tissues seen on the plain radiographs. Degenerative changes. Osteochondral defect in the talar dome. Spurring at the attachment of the Achilles tendon upon the calcaneus. Soft tissue swelling over the foot.   X-ray right foot 7/1: No fracture or dislocation   Podiatry following, continue to appreciate recommendations.   Plan for OR 7/5 for right heel partial calcanectomy and debridement of wound.  Continue daily dressing   MRSA negative   S/p vancomycin (7/1-7/3)  Continue Zosyn (7/1-7/7)  ID following  blood culture x 2 NG 48H  Heel Culture: Pseudomonas aeruginosa .  Leukocytosis improving. WBC 9.4 compared to 13.1 on admission  CRP 15.80  Per chart review, Patient underwent bedside debridement on 03/27 then on 03/29 she underwent surgical debridement in the OR with Podiatry. Wound VAC was placed on right calcaneus and ankle . S/p augmentin for 4 weeks (end of treatment 04/25/2024 )  Sinus arhythmia noted on pre-op EKG   EKG 7/4 initially documented as atrial fibrillation. EKG personally reviewed and appears to be sinus rhythm with occasional PACs. EKG reread as \"Sinus rhythm with marked sinus arrhythmia\"  Continue telemetry  No hx of Afib  This does not preclude patient from planned surgery 7/5  EKG 7/5: \"Sinus rhythm with  technology.**         Electronically signed by Dr. Yung Walker          Diet: Diet NPO    Microbiology: yes - reviewed  Antibiotics: yes - continue    Steroids: no    Telemetry: [x]Yes / []No  Telemetry Review of past 24 hours:  nsr    LDA: []CVC / []PICC / [x]Midline / []Sears / []Drains / []Mediport / []PIV / []None    Labs (still needed?): [x]Yes / []No  IVF (still needed?): []Yes / [x]No    Level of care: []Step Down / [x]Med-Surg  Bed Status: [x]Inpatient / []Observation    DVT Prophylaxis: [x] Lovenox / [] Heparin / [] SCDs / [] Already on Systemic Anticoagulation / [] None     PT/OT: []Yes / [x]No    Disposition:    [x] Home       [] TCU       [] Rehab       [] Psych       [] SNF       [] Long Term Care Facility       [] Other-    Code Status: DNR-CCA      An electronic signature was used to authenticate this note  - Josefina Loya MD PGY-2 on 7/5/2024 at 2:03 PM

## 2024-07-06 PROBLEM — E83.42 HYPOMAGNESEMIA: Status: ACTIVE | Noted: 2024-07-06

## 2024-07-06 PROBLEM — F41.9 ANXIETY DISORDER: Status: ACTIVE | Noted: 2024-07-06

## 2024-07-06 PROBLEM — F32.A DEPRESSION: Status: ACTIVE | Noted: 2024-07-06

## 2024-07-06 LAB
ANION GAP SERPL CALC-SCNC: 13 MEQ/L (ref 8–16)
ANION GAP SERPL CALC-SCNC: 9 MEQ/L (ref 8–16)
BACTERIA BLD AEROBE CULT: NORMAL
BACTERIA BLD AEROBE CULT: NORMAL
BASOPHILS ABSOLUTE: 0.1 THOU/MM3 (ref 0–0.1)
BASOPHILS NFR BLD AUTO: 0.5 %
BUN SERPL-MCNC: 12 MG/DL (ref 7–22)
BUN SERPL-MCNC: 12 MG/DL (ref 7–22)
CALCIUM SERPL-MCNC: 8 MG/DL (ref 8.5–10.5)
CALCIUM SERPL-MCNC: 8.1 MG/DL (ref 8.5–10.5)
CHLORIDE SERPL-SCNC: 95 MEQ/L (ref 98–111)
CHLORIDE SERPL-SCNC: 96 MEQ/L (ref 98–111)
CO2 SERPL-SCNC: 26 MEQ/L (ref 23–33)
CO2 SERPL-SCNC: 27 MEQ/L (ref 23–33)
CREAT SERPL-MCNC: 0.9 MG/DL (ref 0.4–1.2)
CREAT SERPL-MCNC: 1.2 MG/DL (ref 0.4–1.2)
DEPRECATED RDW RBC AUTO: 48.2 FL (ref 35–45)
EOSINOPHIL NFR BLD AUTO: 0.5 %
EOSINOPHILS ABSOLUTE: 0.1 THOU/MM3 (ref 0–0.4)
ERYTHROCYTE [DISTWIDTH] IN BLOOD BY AUTOMATED COUNT: 15.6 % (ref 11.5–14.5)
GFR SERPL CREATININE-BSD FRML MDRD: 47 ML/MIN/1.73M2
GFR SERPL CREATININE-BSD FRML MDRD: 67 ML/MIN/1.73M2
GLUCOSE BLD STRIP.AUTO-MCNC: 174 MG/DL (ref 70–108)
GLUCOSE BLD STRIP.AUTO-MCNC: 298 MG/DL (ref 70–108)
GLUCOSE BLD STRIP.AUTO-MCNC: 357 MG/DL (ref 70–108)
GLUCOSE BLD STRIP.AUTO-MCNC: 359 MG/DL (ref 70–108)
GLUCOSE BLD STRIP.AUTO-MCNC: 360 MG/DL (ref 70–108)
GLUCOSE SERPL-MCNC: 310 MG/DL (ref 70–108)
GLUCOSE SERPL-MCNC: 313 MG/DL (ref 70–108)
HCT VFR BLD AUTO: 24.2 % (ref 37–47)
HCT VFR BLD AUTO: 25.5 % (ref 37–47)
HGB BLD-MCNC: 7.3 GM/DL (ref 12–16)
HGB BLD-MCNC: 7.9 GM/DL (ref 12–16)
IMM GRANULOCYTES # BLD AUTO: 0.07 THOU/MM3 (ref 0–0.07)
IMM GRANULOCYTES NFR BLD AUTO: 0.7 %
LYMPHOCYTES ABSOLUTE: 1.8 THOU/MM3 (ref 1–4.8)
LYMPHOCYTES NFR BLD AUTO: 17 %
MCH RBC QN AUTO: 25.5 PG (ref 26–33)
MCHC RBC AUTO-ENTMCNC: 30.2 GM/DL (ref 32.2–35.5)
MCV RBC AUTO: 84.6 FL (ref 81–99)
MONOCYTES ABSOLUTE: 1 THOU/MM3 (ref 0.4–1.3)
MONOCYTES NFR BLD AUTO: 9.2 %
NEUTROPHILS ABSOLUTE: 7.6 THOU/MM3 (ref 1.8–7.7)
NEUTROPHILS NFR BLD AUTO: 72.1 %
NRBC BLD AUTO-RTO: 0 /100 WBC
PH BLDV: 7.51 [PH] (ref 7.31–7.41)
PLATELET # BLD AUTO: 447 THOU/MM3 (ref 130–400)
PMV BLD AUTO: 10 FL (ref 9.4–12.4)
POTASSIUM SERPL-SCNC: 3.7 MEQ/L (ref 3.5–5.2)
POTASSIUM SERPL-SCNC: 3.8 MEQ/L (ref 3.5–5.2)
RBC # BLD AUTO: 2.86 MILL/MM3 (ref 4.2–5.4)
SODIUM SERPL-SCNC: 131 MEQ/L (ref 135–145)
SODIUM SERPL-SCNC: 135 MEQ/L (ref 135–145)
SODIUM SERPL-SCNC: 136 MEQ/L (ref 135–145)
WBC # BLD AUTO: 10.5 THOU/MM3 (ref 4.8–10.8)

## 2024-07-06 PROCEDURE — 1200000003 HC TELEMETRY R&B

## 2024-07-06 PROCEDURE — 36415 COLL VENOUS BLD VENIPUNCTURE: CPT

## 2024-07-06 PROCEDURE — 6370000000 HC RX 637 (ALT 250 FOR IP)

## 2024-07-06 PROCEDURE — 84295 ASSAY OF SERUM SODIUM: CPT

## 2024-07-06 PROCEDURE — 99233 SBSQ HOSP IP/OBS HIGH 50: CPT | Performed by: FAMILY MEDICINE

## 2024-07-06 PROCEDURE — 85025 COMPLETE CBC W/AUTO DIFF WBC: CPT

## 2024-07-06 PROCEDURE — 85018 HEMOGLOBIN: CPT

## 2024-07-06 PROCEDURE — 1200000000 HC SEMI PRIVATE

## 2024-07-06 PROCEDURE — 82800 BLOOD PH: CPT

## 2024-07-06 PROCEDURE — 6370000000 HC RX 637 (ALT 250 FOR IP): Performed by: FAMILY MEDICINE

## 2024-07-06 PROCEDURE — 2580000003 HC RX 258

## 2024-07-06 PROCEDURE — 82948 REAGENT STRIP/BLOOD GLUCOSE: CPT

## 2024-07-06 PROCEDURE — 6360000002 HC RX W HCPCS

## 2024-07-06 PROCEDURE — 85014 HEMATOCRIT: CPT

## 2024-07-06 PROCEDURE — 80048 BASIC METABOLIC PNL TOTAL CA: CPT

## 2024-07-06 RX ORDER — INSULIN GLARGINE 100 [IU]/ML
10 INJECTION, SOLUTION SUBCUTANEOUS NIGHTLY
Status: DISCONTINUED | OUTPATIENT
Start: 2024-07-06 | End: 2024-07-09

## 2024-07-06 RX ORDER — INSULIN LISPRO 100 [IU]/ML
0-16 INJECTION, SOLUTION INTRAVENOUS; SUBCUTANEOUS EVERY 4 HOURS
Status: DISCONTINUED | OUTPATIENT
Start: 2024-07-07 | End: 2024-07-08

## 2024-07-06 RX ORDER — INSULIN LISPRO 100 [IU]/ML
0-4 INJECTION, SOLUTION INTRAVENOUS; SUBCUTANEOUS NIGHTLY
Status: DISCONTINUED | OUTPATIENT
Start: 2024-07-06 | End: 2024-07-06

## 2024-07-06 RX ORDER — INSULIN LISPRO 100 [IU]/ML
0-16 INJECTION, SOLUTION INTRAVENOUS; SUBCUTANEOUS
Status: DISCONTINUED | OUTPATIENT
Start: 2024-07-06 | End: 2024-07-06

## 2024-07-06 RX ORDER — INSULIN LISPRO 100 [IU]/ML
0-8 INJECTION, SOLUTION INTRAVENOUS; SUBCUTANEOUS
Status: DISCONTINUED | OUTPATIENT
Start: 2024-07-06 | End: 2024-07-06

## 2024-07-06 RX ORDER — INSULIN LISPRO 100 [IU]/ML
5 INJECTION, SOLUTION INTRAVENOUS; SUBCUTANEOUS
Status: DISCONTINUED | OUTPATIENT
Start: 2024-07-06 | End: 2024-07-08

## 2024-07-06 RX ORDER — SODIUM CHLORIDE, SODIUM LACTATE, POTASSIUM CHLORIDE, AND CALCIUM CHLORIDE .6; .31; .03; .02 G/100ML; G/100ML; G/100ML; G/100ML
500 INJECTION, SOLUTION INTRAVENOUS ONCE
Status: DISCONTINUED | OUTPATIENT
Start: 2024-07-06 | End: 2024-07-06

## 2024-07-06 RX ADMIN — SODIUM CHLORIDE, PRESERVATIVE FREE 10 ML: 5 INJECTION INTRAVENOUS at 20:34

## 2024-07-06 RX ADMIN — INSULIN GLARGINE 10 UNITS: 100 INJECTION, SOLUTION SUBCUTANEOUS at 20:35

## 2024-07-06 RX ADMIN — ESCITALOPRAM OXALATE 10 MG: 10 TABLET, FILM COATED ORAL at 08:59

## 2024-07-06 RX ADMIN — PIPERACILLIN AND TAZOBACTAM 3375 MG: 3; .375 INJECTION, POWDER, FOR SOLUTION INTRAVENOUS at 03:51

## 2024-07-06 RX ADMIN — INSULIN LISPRO 8 UNITS: 100 INJECTION, SOLUTION INTRAVENOUS; SUBCUTANEOUS at 09:00

## 2024-07-06 RX ADMIN — ENOXAPARIN SODIUM 40 MG: 100 INJECTION SUBCUTANEOUS at 08:59

## 2024-07-06 RX ADMIN — INSULIN LISPRO 4 UNITS: 100 INJECTION, SOLUTION INTRAVENOUS; SUBCUTANEOUS at 22:22

## 2024-07-06 RX ADMIN — INSULIN LISPRO 5 UNITS: 100 INJECTION, SOLUTION INTRAVENOUS; SUBCUTANEOUS at 09:00

## 2024-07-06 RX ADMIN — PIPERACILLIN AND TAZOBACTAM 3375 MG: 3; .375 INJECTION, POWDER, FOR SOLUTION INTRAVENOUS at 11:30

## 2024-07-06 RX ADMIN — INSULIN LISPRO 4 UNITS: 100 INJECTION, SOLUTION INTRAVENOUS; SUBCUTANEOUS at 17:12

## 2024-07-06 RX ADMIN — LISINOPRIL 40 MG: 40 TABLET ORAL at 08:59

## 2024-07-06 RX ADMIN — ACETAMINOPHEN 650 MG: 325 TABLET ORAL at 20:35

## 2024-07-06 RX ADMIN — PIPERACILLIN AND TAZOBACTAM 3375 MG: 3; .375 INJECTION, POWDER, FOR SOLUTION INTRAVENOUS at 20:34

## 2024-07-06 RX ADMIN — INSULIN LISPRO 5 UNITS: 100 INJECTION, SOLUTION INTRAVENOUS; SUBCUTANEOUS at 17:12

## 2024-07-06 ASSESSMENT — PAIN DESCRIPTION - LOCATION: LOCATION: FOOT

## 2024-07-06 ASSESSMENT — PAIN DESCRIPTION - ORIENTATION: ORIENTATION: RIGHT

## 2024-07-06 ASSESSMENT — PAIN DESCRIPTION - DESCRIPTORS: DESCRIPTORS: ACHING;DISCOMFORT

## 2024-07-06 ASSESSMENT — PAIN - FUNCTIONAL ASSESSMENT: PAIN_FUNCTIONAL_ASSESSMENT: PREVENTS OR INTERFERES SOME ACTIVE ACTIVITIES AND ADLS

## 2024-07-06 ASSESSMENT — PAIN SCALES - GENERAL: PAINLEVEL_OUTOF10: 2

## 2024-07-06 NOTE — PLAN OF CARE
Problem: Discharge Planning  Goal: Discharge to home or other facility with appropriate resources  7/6/2024 0927 by Daniel Jasso RN  Outcome: Progressing  Flowsheets (Taken 7/6/2024 0927)  Discharge to home or other facility with appropriate resources:   Identify barriers to discharge with patient and caregiver   Identify discharge learning needs (meds, wound care, etc)   Refer to discharge planning if patient needs post-hospital services based on physician order or complex needs related to functional status, cognitive ability or social support system   Arrange for needed discharge resources and transportation as appropriate  7/6/2024 0024 by Lisa Blake, RN  Outcome: Progressing  Flowsheets (Taken 7/6/2024 0024)  Discharge to home or other facility with appropriate resources:   Identify barriers to discharge with patient and caregiver   Arrange for needed discharge resources and transportation as appropriate   Identify discharge learning needs (meds, wound care, etc)     Problem: Safety - Adult  Goal: Free from fall injury  7/6/2024 0927 by Daniel Jasso RN  Outcome: Progressing  Flowsheets (Taken 7/6/2024 0927)  Free From Fall Injury: Instruct family/caregiver on patient safety  7/6/2024 0024 by Lisa Blake, RN  Outcome: Progressing  Flowsheets (Taken 7/6/2024 0024)  Free From Fall Injury: Instruct family/caregiver on patient safety     Problem: Skin/Tissue Integrity  Goal: Absence of new skin breakdown  Description: 1.  Monitor for areas of redness and/or skin breakdown  2.  Assess vascular access sites hourly  3.  Every 4-6 hours minimum:  Change oxygen saturation probe site  4.  Every 4-6 hours:  If on nasal continuous positive airway pressure, respiratory therapy assess nares and determine need for appliance change or resting period.  7/6/2024 0927 by Daniel Jasso, RN  Outcome: Progressing  7/6/2024 0024 by Lisa Blake, RN  Outcome: Progressing     Problem: Pain  Goal: Verbalizes/displays  Assess and document dressing/incision, wound bed, drain sites and surrounding tissue   Implement wound care per orders   Initiate pressure ulcer prevention bundle as indicated  7/6/2024 0024 by Lisa Blake RN  Outcome: Progressing  Flowsheets (Taken 7/6/2024 0024)  Incisions, Wounds, or Drain Sites Healing Without Sign and Symptoms of Infection: TWICE DAILY: Assess and document skin integrity     Problem: Chronic Conditions and Co-morbidities  Goal: Patient's chronic conditions and co-morbidity symptoms are monitored and maintained or improved  7/6/2024 0927 by Daniel Jasso RN  Outcome: Progressing  Flowsheets (Taken 7/6/2024 0927)  Care Plan - Patient's Chronic Conditions and Co-Morbidity Symptoms are Monitored and Maintained or Improved:   Monitor and assess patient's chronic conditions and comorbid symptoms for stability, deterioration, or improvement   Collaborate with multidisciplinary team to address chronic and comorbid conditions and prevent exacerbation or deterioration   Update acute care plan with appropriate goals if chronic or comorbid symptoms are exacerbated and prevent overall improvement and discharge  7/6/2024 0024 by Lisa Blake RN  Outcome: Progressing  Flowsheets (Taken 7/6/2024 0024)  Care Plan - Patient's Chronic Conditions and Co-Morbidity Symptoms are Monitored and Maintained or Improved:   Monitor and assess patient's chronic conditions and comorbid symptoms for stability, deterioration, or improvement   Collaborate with multidisciplinary team to address chronic and comorbid conditions and prevent exacerbation or deterioration     Problem: Nutrition Deficit:  Goal: Optimize nutritional status  7/6/2024 0927 by Daniel Jasso RN  Outcome: Progressing  Flowsheets (Taken 7/6/2024 0927)  Nutrient intake appropriate for improving, restoring, or maintaining nutritional needs:   Assess nutritional status and recommend course of action   Monitor oral intake, labs, and treatment

## 2024-07-06 NOTE — PROGRESS NOTES
Podiatric Progress Note  Niya Blankenship  Subjective :   7/6  Patient seen bedside today on behalf of Dr Phelan. Patient is alert and oriented to person, place, time and event. 1 day s/p partial calcanectomy and debridement. Patient's family is present at bedside. Denies pain to her foot. Wound VAC has been working well overnight. Denies any constitutional symptoms. No other pedal concerns.    7/4/24  Progress patient was seen bedside today on behalf of Dr. Phelan.  Patient is alert and oriented to person, place, time and event.  Patient's family is present at bedside.  Patient states that she does not have any pain this morning and had a good night sleep.  Patient is understanding that she will have her procedure tomorrow of partial calcanectomy with wound debridement.  Patient denies any nausea, fever, cough, chills, shortness of breath or chest pain.  Patient denies any other pedal concerns at this time.    7/3/24  Patient was seen bedside today with Dr. Phelan.  Patient is alert and oriented to person, place, time, and event. Patient's family is present at bedside. Patient relates she is feeling well overall and denies pain to the right lower extremity. Patient states she had a good night's sleep last night. Patient's family states that the patient was coughing last night and she did vomit. Patient denies nausea, vomiting, fever, chills, shortness of breath, or chest pain at this time. Denies any other pedal complaints at this time.    7/2/24  Patient was seen bedside today alongside Dr. Phelan.  Patient relates minimal pain to the right lower extremity.  States that she overall feels well however is tired and would like to sleep.  Family at bedside appreciative the visit and wishes to discuss prognosis of wound and care from this point.  Patient denies any N/V/F/C/SOB or CP. No other pedal complaints.    HPI  Patient is a 74 y.o. female with a history of diabetes mellitus and history of chronic right heel ulcer seen  signs of leakage at -125mmHg continuous suction     Neurovascular: Light touch and gross sensation diminished     Musculoskeletal: Muscle strength and ROM testing deferred. Pain with palpation of right heel. Foot and ankle in grossly rectus alignment.      ASSESSMENT  Principle  1.  Chronic right heel ulcer, bone exposure  2.  Diabetes with peripheral neuropathy    Chronic  Patient Active Problem List   Diagnosis    Diabetic ulcer of right heel associated with type 2 diabetes mellitus (HCC)    Non-healing open wound of heel    Osteomyelitis of right foot (HCC)       PLAN:   - Pt. is a 74 y.o. female   - Patient was examined and evaluated  - 1 day s/p R partial calcanectomy and debridement  - WBC 10.5, afebrile  - H&H 7.3/24.2  - , CRP 15.8, albumin 2.8, prealbumin 9.5, total protein 6.7  -Arterial Dopplers ordered, reviewed: \"Excellent pulsatility seen throughout both legs...there is no evidence to suggest significant arterial stenosis.\"  - X-rays and MRI reviewed, noted involvement of the plantar aspect of the calcaneus with lytic destruction.  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  Question involvement of the talus as well.  - Continue IV Antibiotics per primary.  - Wound VAC left intact this visit  - Weight bearing status: Nonweightbearing RLE.  - Discussed importance of nutritional status, ordered high protein oral supplement  - Patient is agreeable to SNF placement, precert pending. Will assess wound on Monday 7/8 with plan to continue wound VAC at facility.  - Patient family expressed gratitude with discussion and would like some time to think over the options.  - Podiatry will continue to follow    DISPO: Pending facility placement, will assess wound appearance 7/8    Jose F Horn DPM PGY-3  7/6/2024   9:57 AM

## 2024-07-06 NOTE — PROGRESS NOTES
PROGRESS NOTE      Patient:  Niya Blankenship  Unit/Bed:7K-12/012-A  YOB: 1950  MRN: [id number]   Acct: [id number]    PCP: Taylor Maza APRN - NP    Date of Admission: 7/1/2024 LOS: 5    Date of Evaluation:  7/6/2024    Anticipated Discharge: 3-4 days pending SNF placement    Osteomyelitis of right foot with leukocytosis  -- MRI right foot 7/2: Soft tissue defect in the plantar soft tissues over the calcaneus. Abnormal signal intensity in the calcaneus consistent with involvement by osteomyelitis. Abnormal signal intensity in the plantar soft tissues over the medial aspect   of the calcaneus consistent with inflammatory process. There was air in the soft tissues seen on the plain radiographs. Degenerative changes. Osteochondral defect in the talar dome. Spurring at the attachment of the Achilles tendon upon the calcaneus. Soft tissue swelling over the foot.   -- X-ray right foot 7/1: No fracture or dislocation   -- Podiatry following, continue to appreciate recommendations.   -- Taken to the OR on 7/5 for right heel partial calcanectomy and debridement of wound.  Patient tolerated procedure well  -- Continue daily dressing   -- MRSA negative   -- S/p vancomycin (7/1-7/3)  -- Continue Zosyn (7/1-7/7)  -- ID following, appreciate recommendations  -- blood culture x 2 NG 48H  -- Wound culture grew Corynebacterium species, gram-negative bacilli, gram-positive cocci  -- Leukocytosis improving.  White blood cell count is 10.5 as of 7/6  -- CRP 15.80  -- Of note, Per chart review, Patient underwent bedside debridement on 03/27 then on 03/29 she underwent surgical debridement in the OR with Podiatry. Wound VAC was placed on right calcaneus and ankle . S/p augmentin for 4 weeks (end of treatment 04/25/2024 )    Sinus arrhythmia noted on pre-op EKG   -- EKG 7/4 initially documented as atrial fibrillation. EKG personally reviewed and appears to be sinus rhythm with occasional PACs. EKG reread as \"Sinus rhythm      Scheduled Medications    insulin lispro  5 Units SubCUTAneous TID     insulin lispro  0-8 Units SubCUTAneous TID WC    insulin lispro  0-4 Units SubCUTAneous Nightly    piperacillin-tazobactam  3,375 mg IntraVENous Q8H    lisinopril  40 mg Oral Daily    insulin glargine  7 Units SubCUTAneous Nightly    potassium chloride  40 mEq Oral Once    sodium chloride flush  5-40 mL IntraVENous 2 times per day    enoxaparin  40 mg SubCUTAneous Daily    escitalopram  10 mg Oral Daily     PRN Meds: hydrOXYzine pamoate, hydrALAZINE, sodium chloride flush, sodium chloride, potassium chloride **OR** potassium alternative oral replacement **OR** potassium chloride, ondansetron **OR** ondansetron, polyethylene glycol, acetaminophen **OR** acetaminophen, zinc oxide, glucose, dextrose bolus **OR** dextrose bolus, glucagon (rDNA), dextrose      Intake/Output Summary (Last 24 hours) at 7/6/2024 1623  Last data filed at 7/6/2024 1351  Gross per 24 hour   Intake 1190 ml   Output --   Net 1190 ml       Exam:  BP (!) 162/74   Pulse 81   Temp 98.5 °F (36.9 °C) (Oral)   Resp 16   Ht 1.626 m (5' 4\")   Wt 59 kg (130 lb)   SpO2 95%   BMI 22.31 kg/m²     Physical Exam  Constitutional:       General: She is not in acute distress.     Appearance: Normal appearance. She is normal weight. She is not ill-appearing, toxic-appearing or diaphoretic.   HENT:      Head: Normocephalic and atraumatic.   Cardiovascular:      Rate and Rhythm: Normal rate and regular rhythm.      Pulses: Normal pulses.      Heart sounds: Normal heart sounds. No murmur heard.     No friction rub. No gallop.   Pulmonary:      Effort: Pulmonary effort is normal. No respiratory distress.      Breath sounds: Normal breath sounds. No stridor. No wheezing, rhonchi or rales.   Abdominal:      General: Abdomen is flat. Bowel sounds are normal. There is no distension.      Palpations: Abdomen is soft.      Tenderness: There is no abdominal tenderness. There is no guarding.    3. Abnormal signal intensity in the plantar soft tissues over the medial aspect   of the calcaneus consistent with inflammatory process. There was air in the soft   tissues seen on the plain radiographs.   4. Degenerative changes.   5. Osteochondral defect in the talar dome.   6. Spurring at the attachment of the Achilles tendon upon the calcaneus.   7. Soft tissue swelling over the foot.               **This report has been created using voice recognition software. It may contain   minor errors which are inherent in voice recognition technology.**         Electronically signed by Dr. Jayne Terry      XR FOOT RIGHT (MIN 3 VIEWS)   Final Result      No fracture or dislocation.            **This report has been created using voice recognition software. It may contain   minor errors which are inherent in voice recognition technology.**         Electronically signed by Dr. Yung Walker          Diet: ADULT ORAL NUTRITION SUPPLEMENT; Dinner; Wound Healing Oral Supplement  ADULT DIET; Regular; 5 carb choices (75 gm/meal)    Microbiology: yes - reviewed  Antibiotics: yes - continue     Steroids: no     Telemetry: [x]Yes / []No  Telemetry Review of past 24 hours:  nsr     LDA: []CVC / []PICC / [x]Midline / []Sears / []Drains / []Mediport / []PIV / []None     Labs (still needed?): [x]Yes / []No  IVF (still needed?): []Yes / [x]No     Level of care: []Step Down / [x]Med-Surg  Bed Status: [x]Inpatient / []Observation     DVT Prophylaxis: [x] Lovenox / [] Heparin / [] SCDs / [] Already on Systemic Anticoagulation / [] None      PT/OT: []Yes / [x]No     Disposition:      [] Home                             [] TCU                             [] Rehab                             [] Psych                             [x] SNF                             [] Long Term Care Facility                             [] Other-    Code Status: DNR-CCA      An electronic signature was used to authenticate this note  - Mariajose Will DO

## 2024-07-06 NOTE — PLAN OF CARE
Problem: Discharge Planning  Goal: Discharge to home or other facility with appropriate resources  Outcome: Progressing  Flowsheets (Taken 7/6/2024 0024)  Discharge to home or other facility with appropriate resources:   Identify barriers to discharge with patient and caregiver   Arrange for needed discharge resources and transportation as appropriate   Identify discharge learning needs (meds, wound care, etc)     Problem: Safety - Adult  Goal: Free from fall injury  Outcome: Progressing  Flowsheets (Taken 7/6/2024 0024)  Free From Fall Injury: Instruct family/caregiver on patient safety     Problem: Skin/Tissue Integrity  Goal: Absence of new skin breakdown  Description: 1.  Monitor for areas of redness and/or skin breakdown  2.  Assess vascular access sites hourly  3.  Every 4-6 hours minimum:  Change oxygen saturation probe site  4.  Every 4-6 hours:  If on nasal continuous positive airway pressure, respiratory therapy assess nares and determine need for appliance change or resting period.  Outcome: Progressing     Problem: Pain  Goal: Verbalizes/displays adequate comfort level or baseline comfort level  Outcome: Progressing  Flowsheets (Taken 7/6/2024 0024)  Verbalizes/displays adequate comfort level or baseline comfort level:   Encourage patient to monitor pain and request assistance   Assess pain using appropriate pain scale   Administer analgesics based on type and severity of pain and evaluate response   Implement non-pharmacological measures as appropriate and evaluate response     Problem: Skin/Tissue Integrity - Adult  Goal: Skin integrity remains intact  Outcome: Progressing  Flowsheets (Taken 7/6/2024 0024)  Skin Integrity Remains Intact: Monitor for areas of redness and/or skin breakdown     Problem: Skin/Tissue Integrity - Adult  Goal: Incisions, wounds, or drain sites healing without S/S of infection  Outcome: Progressing  Flowsheets (Taken 7/6/2024 0024)  Incisions, Wounds, or Drain Sites Healing  Without Sign and Symptoms of Infection: TWICE DAILY: Assess and document skin integrity     Problem: Chronic Conditions and Co-morbidities  Goal: Patient's chronic conditions and co-morbidity symptoms are monitored and maintained or improved  Outcome: Progressing  Flowsheets (Taken 7/6/2024 0024)  Care Plan - Patient's Chronic Conditions and Co-Morbidity Symptoms are Monitored and Maintained or Improved:   Monitor and assess patient's chronic conditions and comorbid symptoms for stability, deterioration, or improvement   Collaborate with multidisciplinary team to address chronic and comorbid conditions and prevent exacerbation or deterioration     Problem: Nutrition Deficit:  Goal: Optimize nutritional status  Outcome: Progressing  Flowsheets (Taken 7/6/2024 0024)  Nutrient intake appropriate for improving, restoring, or maintaining nutritional needs:   Assess nutritional status and recommend course of action   Monitor oral intake, labs, and treatment plans     Care plan reviewed with patient. Patient verbalize understanding of the plan of care and contribute to goal setting.

## 2024-07-06 NOTE — PROGRESS NOTES
This is a spiritual health encounter in response to request for advance directives to be completed with patient. Patient, Niya, declined filling out paperwork at this time. Her daughter, Cierra, was present and received information regarding advance care planning forms.  provided overview and blank copies of the documents.      team will remain available to support patient/family, PRN.     Chaplain Reynaldo Robbins M.Div     [place] [address]  [phone]

## 2024-07-07 PROBLEM — M86.171 OSTEOMYELITIS OF FOOT, RIGHT, ACUTE (HCC): Status: ACTIVE | Noted: 2024-07-02

## 2024-07-07 PROBLEM — E11.9 TYPE 2 DIABETES MELLITUS TREATED WITHOUT INSULIN (HCC): Status: ACTIVE | Noted: 2024-07-07

## 2024-07-07 PROBLEM — I49.8 SINUS ARRHYTHMIA: Status: ACTIVE | Noted: 2024-07-07

## 2024-07-07 PROBLEM — D50.9 CHRONIC IRON DEFICIENCY ANEMIA: Status: ACTIVE | Noted: 2024-07-07

## 2024-07-07 PROBLEM — D75.839 THROMBOCYTOSIS: Status: ACTIVE | Noted: 2024-07-07

## 2024-07-07 PROBLEM — G93.41 METABOLIC ENCEPHALOPATHY: Status: ACTIVE | Noted: 2024-07-07

## 2024-07-07 PROBLEM — D62 ACUTE BLOOD LOSS ANEMIA: Status: ACTIVE | Noted: 2024-07-07

## 2024-07-07 PROBLEM — I10 ESSENTIAL HYPERTENSION: Status: ACTIVE | Noted: 2024-07-07

## 2024-07-07 PROBLEM — E87.6 HYPOKALEMIA: Status: ACTIVE | Noted: 2024-07-07

## 2024-07-07 LAB
ANION GAP SERPL CALC-SCNC: 9 MEQ/L (ref 8–16)
BASOPHILS ABSOLUTE: 0.1 THOU/MM3 (ref 0–0.1)
BASOPHILS NFR BLD AUTO: 0.8 %
BUN SERPL-MCNC: 13 MG/DL (ref 7–22)
CALCIUM SERPL-MCNC: 9 MG/DL (ref 8.5–10.5)
CHLORIDE SERPL-SCNC: 99 MEQ/L (ref 98–111)
CO2 SERPL-SCNC: 31 MEQ/L (ref 23–33)
CREAT SERPL-MCNC: 1 MG/DL (ref 0.4–1.2)
DEPRECATED RDW RBC AUTO: 46.8 FL (ref 35–45)
EOSINOPHIL NFR BLD AUTO: 3.4 %
EOSINOPHILS ABSOLUTE: 0.3 THOU/MM3 (ref 0–0.4)
ERYTHROCYTE [DISTWIDTH] IN BLOOD BY AUTOMATED COUNT: 15.4 % (ref 11.5–14.5)
GFR SERPL CREATININE-BSD FRML MDRD: 59 ML/MIN/1.73M2
GLUCOSE BLD STRIP.AUTO-MCNC: 125 MG/DL (ref 70–108)
GLUCOSE BLD STRIP.AUTO-MCNC: 143 MG/DL (ref 70–108)
GLUCOSE BLD STRIP.AUTO-MCNC: 192 MG/DL (ref 70–108)
GLUCOSE BLD STRIP.AUTO-MCNC: 234 MG/DL (ref 70–108)
GLUCOSE BLD STRIP.AUTO-MCNC: 271 MG/DL (ref 70–108)
GLUCOSE BLD STRIP.AUTO-MCNC: 296 MG/DL (ref 70–108)
GLUCOSE BLD STRIP.AUTO-MCNC: 332 MG/DL (ref 70–108)
GLUCOSE SERPL-MCNC: 123 MG/DL (ref 70–108)
HCT VFR BLD AUTO: 26.7 % (ref 37–47)
HGB BLD-MCNC: 8.1 GM/DL (ref 12–16)
IMM GRANULOCYTES # BLD AUTO: 0.08 THOU/MM3 (ref 0–0.07)
IMM GRANULOCYTES NFR BLD AUTO: 0.8 %
LYMPHOCYTES ABSOLUTE: 1.8 THOU/MM3 (ref 1–4.8)
LYMPHOCYTES NFR BLD AUTO: 18.7 %
MAGNESIUM SERPL-MCNC: 1.5 MG/DL (ref 1.6–2.4)
MCH RBC QN AUTO: 25.4 PG (ref 26–33)
MCHC RBC AUTO-ENTMCNC: 30.3 GM/DL (ref 32.2–35.5)
MCV RBC AUTO: 83.7 FL (ref 81–99)
MONOCYTES ABSOLUTE: 0.8 THOU/MM3 (ref 0.4–1.3)
MONOCYTES NFR BLD AUTO: 8.2 %
NEUTROPHILS ABSOLUTE: 6.6 THOU/MM3 (ref 1.8–7.7)
NEUTROPHILS NFR BLD AUTO: 68.1 %
NRBC BLD AUTO-RTO: 0 /100 WBC
OSMOLALITY SERPL: 290 MOSMOL/KG (ref 275–295)
PLATELET # BLD AUTO: 479 THOU/MM3 (ref 130–400)
PMV BLD AUTO: 10.1 FL (ref 9.4–12.4)
POTASSIUM SERPL-SCNC: 3.9 MEQ/L (ref 3.5–5.2)
RBC # BLD AUTO: 3.19 MILL/MM3 (ref 4.2–5.4)
SODIUM SERPL-SCNC: 139 MEQ/L (ref 135–145)
WBC # BLD AUTO: 9.7 THOU/MM3 (ref 4.8–10.8)

## 2024-07-07 PROCEDURE — 6370000000 HC RX 637 (ALT 250 FOR IP)

## 2024-07-07 PROCEDURE — 1200000003 HC TELEMETRY R&B

## 2024-07-07 PROCEDURE — 2580000003 HC RX 258

## 2024-07-07 PROCEDURE — 83930 ASSAY OF BLOOD OSMOLALITY: CPT

## 2024-07-07 PROCEDURE — 85025 COMPLETE CBC W/AUTO DIFF WBC: CPT

## 2024-07-07 PROCEDURE — 83735 ASSAY OF MAGNESIUM: CPT

## 2024-07-07 PROCEDURE — 82948 REAGENT STRIP/BLOOD GLUCOSE: CPT

## 2024-07-07 PROCEDURE — 80048 BASIC METABOLIC PNL TOTAL CA: CPT

## 2024-07-07 PROCEDURE — 6370000000 HC RX 637 (ALT 250 FOR IP): Performed by: NURSE PRACTITIONER

## 2024-07-07 PROCEDURE — 6360000002 HC RX W HCPCS

## 2024-07-07 PROCEDURE — 36415 COLL VENOUS BLD VENIPUNCTURE: CPT

## 2024-07-07 PROCEDURE — 99232 SBSQ HOSP IP/OBS MODERATE 35: CPT | Performed by: FAMILY MEDICINE

## 2024-07-07 PROCEDURE — 1200000000 HC SEMI PRIVATE

## 2024-07-07 PROCEDURE — 6370000000 HC RX 637 (ALT 250 FOR IP): Performed by: FAMILY MEDICINE

## 2024-07-07 RX ADMIN — LISINOPRIL 40 MG: 40 TABLET ORAL at 08:38

## 2024-07-07 RX ADMIN — PIPERACILLIN AND TAZOBACTAM 3375 MG: 3; .375 INJECTION, POWDER, FOR SOLUTION INTRAVENOUS at 21:26

## 2024-07-07 RX ADMIN — INSULIN LISPRO 8 UNITS: 100 INJECTION, SOLUTION INTRAVENOUS; SUBCUTANEOUS at 20:42

## 2024-07-07 RX ADMIN — INSULIN LISPRO 8 UNITS: 100 INJECTION, SOLUTION INTRAVENOUS; SUBCUTANEOUS at 16:49

## 2024-07-07 RX ADMIN — INSULIN LISPRO 5 UNITS: 100 INJECTION, SOLUTION INTRAVENOUS; SUBCUTANEOUS at 12:20

## 2024-07-07 RX ADMIN — SODIUM CHLORIDE, PRESERVATIVE FREE 10 ML: 5 INJECTION INTRAVENOUS at 21:26

## 2024-07-07 RX ADMIN — ESCITALOPRAM OXALATE 10 MG: 10 TABLET, FILM COATED ORAL at 08:38

## 2024-07-07 RX ADMIN — SODIUM CHLORIDE, PRESERVATIVE FREE 10 ML: 5 INJECTION INTRAVENOUS at 08:37

## 2024-07-07 RX ADMIN — INSULIN GLARGINE 10 UNITS: 100 INJECTION, SOLUTION SUBCUTANEOUS at 20:43

## 2024-07-07 RX ADMIN — INSULIN LISPRO 5 UNITS: 100 INJECTION, SOLUTION INTRAVENOUS; SUBCUTANEOUS at 08:38

## 2024-07-07 RX ADMIN — INSULIN LISPRO 12 UNITS: 100 INJECTION, SOLUTION INTRAVENOUS; SUBCUTANEOUS at 00:38

## 2024-07-07 RX ADMIN — INSULIN LISPRO 4 UNITS: 100 INJECTION, SOLUTION INTRAVENOUS; SUBCUTANEOUS at 23:33

## 2024-07-07 RX ADMIN — ENOXAPARIN SODIUM 40 MG: 100 INJECTION SUBCUTANEOUS at 08:38

## 2024-07-07 RX ADMIN — PIPERACILLIN AND TAZOBACTAM 3375 MG: 3; .375 INJECTION, POWDER, FOR SOLUTION INTRAVENOUS at 04:15

## 2024-07-07 RX ADMIN — INSULIN LISPRO 5 UNITS: 100 INJECTION, SOLUTION INTRAVENOUS; SUBCUTANEOUS at 16:48

## 2024-07-07 RX ADMIN — PIPERACILLIN AND TAZOBACTAM 3375 MG: 3; .375 INJECTION, POWDER, FOR SOLUTION INTRAVENOUS at 12:24

## 2024-07-07 NOTE — PLAN OF CARE
Problem: Discharge Planning  Goal: Discharge to home or other facility with appropriate resources  Outcome: Progressing  Flowsheets (Taken 7/6/2024 0927)  Discharge to home or other facility with appropriate resources:   Identify barriers to discharge with patient and caregiver   Identify discharge learning needs (meds, wound care, etc)   Refer to discharge planning if patient needs post-hospital services based on physician order or complex needs related to functional status, cognitive ability or social support system   Arrange for needed discharge resources and transportation as appropriate     Problem: Safety - Adult  Goal: Free from fall injury  7/7/2024 0953 by Daniel Jasso, RN  Outcome: Progressing  Flowsheets (Taken 7/6/2024 2256 by Montserrat Jackson, RN)  Free From Fall Injury: Instruct family/caregiver on patient safety  7/6/2024 2256 by Montserrat Jackson RN  Outcome: Progressing  Flowsheets (Taken 7/6/2024 2256)  Free From Fall Injury: Instruct family/caregiver on patient safety     Problem: Skin/Tissue Integrity  Goal: Absence of new skin breakdown  Description: 1.  Monitor for areas of redness and/or skin breakdown  2.  Assess vascular access sites hourly  3.  Every 4-6 hours minimum:  Change oxygen saturation probe site  4.  Every 4-6 hours:  If on nasal continuous positive airway pressure, respiratory therapy assess nares and determine need for appliance change or resting period.  Outcome: Progressing     Problem: Pain  Goal: Verbalizes/displays adequate comfort level or baseline comfort level  7/7/2024 0953 by Daniel Jasso, RN  Outcome: Progressing  Flowsheets (Taken 7/7/2024 0953)  Verbalizes/displays adequate comfort level or baseline comfort level:   Encourage patient to monitor pain and request assistance   Administer analgesics based on type and severity of pain and evaluate response   Consider cultural and social influences on pain and pain management   Assess pain using appropriate pain

## 2024-07-07 NOTE — PLAN OF CARE
Problem: Safety - Adult  Goal: Free from fall injury  7/6/2024 2256 by Montserrat Jackson RN  Outcome: Progressing  Flowsheets (Taken 7/6/2024 2256)  Free From Fall Injury: Instruct family/caregiver on patient safety     Problem: Pain  Goal: Verbalizes/displays adequate comfort level or baseline comfort level  7/6/2024 2256 by Montserrat Jackson RN  Outcome: Progressing  Flowsheets (Taken 7/6/2024 2256)  Verbalizes/displays adequate comfort level or baseline comfort level:   Encourage patient to monitor pain and request assistance   Assess pain using appropriate pain scale     Problem: Skin/Tissue Integrity - Adult  Goal: Skin integrity remains intact  7/6/2024 2256 by Montserrat Jackson RN  Outcome: Progressing  Flowsheets (Taken 7/6/2024 2256)  Skin Integrity Remains Intact: Monitor for areas of redness and/or skin breakdown

## 2024-07-07 NOTE — PROGRESS NOTES
outpatient, with the most recent dose 7/1 in the morning. Her daughter was present on 7/1 to assist with history. \"     Subjective/HPI:   Niya Blankenship feels fine overall. She denies HA, SOB, CP, N/V/D. States that she is hungry and is waiting for her breakfast. Pt states she is still wanting to go to SNF at [place]. Pt was seen and evaluated at bedside with family present.       PMH, SURGICAL HX, FH, SOCIAL HX reviewed and updated as needed.    Medications:  Reviewed    Infusion Medications    sodium chloride      dextrose       Scheduled Medications    insulin lispro  5 Units SubCUTAneous TID WC    insulin glargine  10 Units SubCUTAneous Nightly    insulin lispro  0-16 Units SubCUTAneous Q4H    piperacillin-tazobactam  3,375 mg IntraVENous Q8H    lisinopril  40 mg Oral Daily    potassium chloride  40 mEq Oral Once    sodium chloride flush  5-40 mL IntraVENous 2 times per day    enoxaparin  40 mg SubCUTAneous Daily    escitalopram  10 mg Oral Daily     PRN Meds: hydrOXYzine pamoate, hydrALAZINE, sodium chloride flush, sodium chloride, potassium chloride **OR** potassium alternative oral replacement **OR** potassium chloride, ondansetron **OR** ondansetron, polyethylene glycol, acetaminophen **OR** acetaminophen, zinc oxide, glucose, dextrose bolus **OR** dextrose bolus, glucagon (rDNA), dextrose      Intake/Output Summary (Last 24 hours) at 7/7/2024 1241  Last data filed at 7/7/2024 0541  Gross per 24 hour   Intake 600 ml   Output 1650 ml   Net -1050 ml       Exam:  BP (!) 160/81   Pulse 85   Temp 99.1 °F (37.3 °C)   Resp 20   Ht 1.626 m (5' 4\")   Wt 59 kg (130 lb)   SpO2 93%   BMI 22.31 kg/m²     Physical Exam  Vitals reviewed.   Constitutional:       General: She is not in acute distress.     Appearance: She is not toxic-appearing.   HENT:      Head: Normocephalic and atraumatic.   Cardiovascular:      Rate and Rhythm: Normal rate and regular rhythm.      Heart sounds: Normal heart sounds.   Pulmonary:       Effort: Pulmonary effort is normal. No respiratory distress.      Breath sounds: Normal breath sounds.   Abdominal:      Palpations: Abdomen is soft.      Tenderness: There is no abdominal tenderness.   Musculoskeletal:      Comments: Wound vac on and intact over right LE   Skin:     General: Skin is warm.   Neurological:      Mental Status: She is alert. Mental status is at baseline.   Psychiatric:         Mood and Affect: Mood normal.        All labs reviewed and interpreted by me:  Labs:   Recent Labs     07/05/24  0502 07/06/24  0722 07/06/24 1753 07/07/24  0734   WBC 9.4 10.5  --  9.7   HGB 8.4* 7.3* 7.9* 8.1*   HCT 27.0* 24.2* 25.5* 26.7*   * 447*  --  479*     Recent Labs     07/06/24  0722 07/06/24  1753 07/06/24  2221 07/07/24  0734   * 136 135 139   K 3.8  --  3.7 3.9   CL 95*  --  96* 99   CO2 27  --  26 31   BUN 12  --  12 13   CREATININE 0.9  --  1.2 1.0   CALCIUM 8.0*  --  8.1* 9.0     No results for input(s): \"AST\", \"ALT\", \"BILIDIR\", \"BILITOT\", \"ALKPHOS\" in the last 72 hours.  No results for input(s): \"INR\" in the last 72 hours.  No results for input(s): \"CKTOTAL\", \"TROPONINT\" in the last 72 hours.    Urinalysis:      Lab Results   Component Value Date/Time    NITRU NEGATIVE 07/01/2024 03:17 PM    WBCUA 0-2 07/01/2024 03:17 PM    BACTERIA NONE SEEN 07/01/2024 03:17 PM    RBCUA 0-2 07/01/2024 03:17 PM    BLOODU NEGATIVE 07/01/2024 03:17 PM    GLUCOSEU NEGATIVE 07/01/2024 03:17 PM       All radiology images and reports reviewed and interpreted by me:  Radiology:  Vascular duplex lower extremity arteries bilateral   Final Result   Excellent pulsatility throughout both legs.            **This report has been created using voice recognition software.  It may contain   minor errors which are inherent in voice recognition technology.**            Electronically signed by Dr. John Wu      MRI FOOT RIGHT WO CONTRAST   Final Result   1. Soft tissue defect in the plantar soft tissues over the

## 2024-07-08 LAB
ANION GAP SERPL CALC-SCNC: 13 MEQ/L (ref 8–16)
BASOPHILS ABSOLUTE: 0.1 THOU/MM3 (ref 0–0.1)
BASOPHILS NFR BLD AUTO: 1.1 %
BUN SERPL-MCNC: 11 MG/DL (ref 7–22)
CALCIUM SERPL-MCNC: 9.1 MG/DL (ref 8.5–10.5)
CHLORIDE SERPL-SCNC: 97 MEQ/L (ref 98–111)
CO2 SERPL-SCNC: 29 MEQ/L (ref 23–33)
CREAT SERPL-MCNC: 0.9 MG/DL (ref 0.4–1.2)
DEPRECATED RDW RBC AUTO: 46.5 FL (ref 35–45)
EOSINOPHIL NFR BLD AUTO: 4.7 %
EOSINOPHILS ABSOLUTE: 0.4 THOU/MM3 (ref 0–0.4)
ERYTHROCYTE [DISTWIDTH] IN BLOOD BY AUTOMATED COUNT: 15.5 % (ref 11.5–14.5)
GFR SERPL CREATININE-BSD FRML MDRD: 67 ML/MIN/1.73M2
GLUCOSE BLD STRIP.AUTO-MCNC: 161 MG/DL (ref 70–108)
GLUCOSE BLD STRIP.AUTO-MCNC: 173 MG/DL (ref 70–108)
GLUCOSE BLD STRIP.AUTO-MCNC: 245 MG/DL (ref 70–108)
GLUCOSE BLD STRIP.AUTO-MCNC: 327 MG/DL (ref 70–108)
GLUCOSE BLD STRIP.AUTO-MCNC: 360 MG/DL (ref 70–108)
GLUCOSE SERPL-MCNC: 164 MG/DL (ref 70–108)
HCT VFR BLD AUTO: 28.6 % (ref 37–47)
HGB BLD-MCNC: 8.7 GM/DL (ref 12–16)
IMM GRANULOCYTES # BLD AUTO: 0.09 THOU/MM3 (ref 0–0.07)
IMM GRANULOCYTES NFR BLD AUTO: 1.1 %
LYMPHOCYTES ABSOLUTE: 2.1 THOU/MM3 (ref 1–4.8)
LYMPHOCYTES NFR BLD AUTO: 23.9 %
MCH RBC QN AUTO: 25.3 PG (ref 26–33)
MCHC RBC AUTO-ENTMCNC: 30.4 GM/DL (ref 32.2–35.5)
MCV RBC AUTO: 83.1 FL (ref 81–99)
MONOCYTES ABSOLUTE: 0.9 THOU/MM3 (ref 0.4–1.3)
MONOCYTES NFR BLD AUTO: 10.3 %
NEUTROPHILS ABSOLUTE: 5.1 THOU/MM3 (ref 1.8–7.7)
NEUTROPHILS NFR BLD AUTO: 58.9 %
NRBC BLD AUTO-RTO: 0 /100 WBC
PLATELET # BLD AUTO: 473 THOU/MM3 (ref 130–400)
PMV BLD AUTO: 9.9 FL (ref 9.4–12.4)
POTASSIUM SERPL-SCNC: 4 MEQ/L (ref 3.5–5.2)
RBC # BLD AUTO: 3.44 MILL/MM3 (ref 4.2–5.4)
SODIUM SERPL-SCNC: 139 MEQ/L (ref 135–145)
WBC # BLD AUTO: 8.6 THOU/MM3 (ref 4.8–10.8)

## 2024-07-08 PROCEDURE — 99232 SBSQ HOSP IP/OBS MODERATE 35: CPT | Performed by: FAMILY MEDICINE

## 2024-07-08 PROCEDURE — 6370000000 HC RX 637 (ALT 250 FOR IP)

## 2024-07-08 PROCEDURE — 97530 THERAPEUTIC ACTIVITIES: CPT

## 2024-07-08 PROCEDURE — 97605 NEG PRS WND THER DME<=50SQCM: CPT

## 2024-07-08 PROCEDURE — 80048 BASIC METABOLIC PNL TOTAL CA: CPT

## 2024-07-08 PROCEDURE — 6370000000 HC RX 637 (ALT 250 FOR IP): Performed by: NURSE PRACTITIONER

## 2024-07-08 PROCEDURE — 1200000003 HC TELEMETRY R&B

## 2024-07-08 PROCEDURE — 97110 THERAPEUTIC EXERCISES: CPT

## 2024-07-08 PROCEDURE — 97166 OT EVAL MOD COMPLEX 45 MIN: CPT

## 2024-07-08 PROCEDURE — 85025 COMPLETE CBC W/AUTO DIFF WBC: CPT

## 2024-07-08 PROCEDURE — 6360000002 HC RX W HCPCS

## 2024-07-08 PROCEDURE — 6360000002 HC RX W HCPCS: Performed by: INTERNAL MEDICINE

## 2024-07-08 PROCEDURE — 97162 PT EVAL MOD COMPLEX 30 MIN: CPT

## 2024-07-08 PROCEDURE — 82948 REAGENT STRIP/BLOOD GLUCOSE: CPT

## 2024-07-08 PROCEDURE — 1200000000 HC SEMI PRIVATE

## 2024-07-08 PROCEDURE — 36415 COLL VENOUS BLD VENIPUNCTURE: CPT

## 2024-07-08 PROCEDURE — 2580000003 HC RX 258

## 2024-07-08 PROCEDURE — 2580000003 HC RX 258: Performed by: INTERNAL MEDICINE

## 2024-07-08 PROCEDURE — 6370000000 HC RX 637 (ALT 250 FOR IP): Performed by: FAMILY MEDICINE

## 2024-07-08 RX ORDER — AMLODIPINE BESYLATE 2.5 MG/1
2.5 TABLET ORAL DAILY
Status: DISCONTINUED | OUTPATIENT
Start: 2024-07-08 | End: 2024-07-17 | Stop reason: HOSPADM

## 2024-07-08 RX ORDER — INSULIN LISPRO 100 [IU]/ML
0-8 INJECTION, SOLUTION INTRAVENOUS; SUBCUTANEOUS
Status: DISCONTINUED | OUTPATIENT
Start: 2024-07-08 | End: 2024-07-17 | Stop reason: HOSPADM

## 2024-07-08 RX ORDER — INSULIN LISPRO 100 [IU]/ML
0-4 INJECTION, SOLUTION INTRAVENOUS; SUBCUTANEOUS NIGHTLY
Status: DISCONTINUED | OUTPATIENT
Start: 2024-07-08 | End: 2024-07-17 | Stop reason: HOSPADM

## 2024-07-08 RX ORDER — INSULIN LISPRO 100 [IU]/ML
8 INJECTION, SOLUTION INTRAVENOUS; SUBCUTANEOUS
Status: DISCONTINUED | OUTPATIENT
Start: 2024-07-09 | End: 2024-07-09

## 2024-07-08 RX ADMIN — INSULIN GLARGINE 10 UNITS: 100 INJECTION, SOLUTION SUBCUTANEOUS at 21:51

## 2024-07-08 RX ADMIN — MEROPENEM 1000 MG: 1 INJECTION INTRAVENOUS at 16:58

## 2024-07-08 RX ADMIN — INSULIN LISPRO 5 UNITS: 100 INJECTION, SOLUTION INTRAVENOUS; SUBCUTANEOUS at 16:54

## 2024-07-08 RX ADMIN — AMLODIPINE BESYLATE 2.5 MG: 2.5 TABLET ORAL at 10:24

## 2024-07-08 RX ADMIN — ENOXAPARIN SODIUM 40 MG: 100 INJECTION SUBCUTANEOUS at 09:11

## 2024-07-08 RX ADMIN — INSULIN LISPRO 4 UNITS: 100 INJECTION, SOLUTION INTRAVENOUS; SUBCUTANEOUS at 13:02

## 2024-07-08 RX ADMIN — MEROPENEM 1000 MG: 1 INJECTION INTRAVENOUS at 10:24

## 2024-07-08 RX ADMIN — PIPERACILLIN AND TAZOBACTAM 3375 MG: 3; .375 INJECTION, POWDER, FOR SOLUTION INTRAVENOUS at 05:01

## 2024-07-08 RX ADMIN — INSULIN LISPRO 5 UNITS: 100 INJECTION, SOLUTION INTRAVENOUS; SUBCUTANEOUS at 09:09

## 2024-07-08 RX ADMIN — INSULIN LISPRO 12 UNITS: 100 INJECTION, SOLUTION INTRAVENOUS; SUBCUTANEOUS at 16:54

## 2024-07-08 RX ADMIN — LISINOPRIL 40 MG: 40 TABLET ORAL at 09:11

## 2024-07-08 RX ADMIN — SODIUM CHLORIDE, PRESERVATIVE FREE 10 ML: 5 INJECTION INTRAVENOUS at 09:09

## 2024-07-08 RX ADMIN — ESCITALOPRAM OXALATE 10 MG: 10 TABLET, FILM COATED ORAL at 09:11

## 2024-07-08 RX ADMIN — INSULIN LISPRO 5 UNITS: 100 INJECTION, SOLUTION INTRAVENOUS; SUBCUTANEOUS at 13:02

## 2024-07-08 RX ADMIN — ACETAMINOPHEN 650 MG: 325 TABLET ORAL at 16:59

## 2024-07-08 RX ADMIN — SODIUM CHLORIDE, PRESERVATIVE FREE 10 ML: 5 INJECTION INTRAVENOUS at 21:57

## 2024-07-08 RX ADMIN — INSULIN LISPRO 4 UNITS: 100 INJECTION, SOLUTION INTRAVENOUS; SUBCUTANEOUS at 21:53

## 2024-07-08 ASSESSMENT — PAIN SCALES - GENERAL
PAINLEVEL_OUTOF10: 0
PAINLEVEL_OUTOF10: 3
PAINLEVEL_OUTOF10: 2

## 2024-07-08 ASSESSMENT — PAIN DESCRIPTION - DESCRIPTORS: DESCRIPTORS: SORE

## 2024-07-08 ASSESSMENT — PAIN DESCRIPTION - ORIENTATION: ORIENTATION: RIGHT

## 2024-07-08 ASSESSMENT — PAIN DESCRIPTION - LOCATION: LOCATION: FOOT

## 2024-07-08 ASSESSMENT — PAIN - FUNCTIONAL ASSESSMENT: PAIN_FUNCTIONAL_ASSESSMENT: ACTIVITIES ARE NOT PREVENTED

## 2024-07-08 NOTE — CARE COORDINATION
DISCHARGE PLANNING EVALUATION  7/8/24, 12:00 PM EDT    Reason for Referral: snf  Decision Maker: makes own decisions, no POA  Current Services: none   New Services Requested:  snf for rehab   Family/ Social/ Home environment: Niya lives at home with family, has good support from family and community, can arrange transportation  Payment Source:[employer]  Transportation at Discharge: ambulance  Post-acute (PAC) provider list was provided to patient. Patient was informed of their freedom to choose PAC provider. Discussed and offered to show the patient the relevant PAC Providers quality and resource use measures on Medicare Compare web site via computer based on patient's goals of care and treatment preferences. Questions regarding selection process were answered.  Declined list, prefers [facility] in [facility]    Teach Back Method used with patient and son regarding care plan and discharge plan  Patient and son verbalized understanding of the plan of care and contribute to goal setting.       Patient preferences and discharge plan:  spoke with patient and her son.  She requests referral to [facility] in [facility].  Referral initiated, facility will review and verify insurance benefits.    Electronically signed by SARAH Lemus on 7/8/2024 at 12:00 PM

## 2024-07-08 NOTE — PLAN OF CARE
Problem: Discharge Planning  Goal: Discharge to home or other facility with appropriate resources  Outcome: Progressing     Problem: Safety - Adult  Goal: Free from fall injury  Outcome: Progressing     Problem: Skin/Tissue Integrity  Goal: Absence of new skin breakdown  Description: 1.  Monitor for areas of redness and/or skin breakdown  2.  Assess vascular access sites hourly  3.  Every 4-6 hours minimum:  Change oxygen saturation probe site  4.  Every 4-6 hours:  If on nasal continuous positive airway pressure, respiratory therapy assess nares and determine need for appliance change or resting period.  Outcome: Progressing     Problem: Pain  Goal: Verbalizes/displays adequate comfort level or baseline comfort level  Outcome: Progressing     Problem: Skin/Tissue Integrity - Adult  Goal: Skin integrity remains intact  Outcome: Progressing

## 2024-07-08 NOTE — PLAN OF CARE
Problem: Discharge Planning  Goal: Discharge to home or other facility with appropriate resources  7/8/2024 1036 by Daniel Jasso RN  Outcome: Progressing  Flowsheets (Taken 7/6/2024 0927)  Discharge to home or other facility with appropriate resources:   Identify barriers to discharge with patient and caregiver   Identify discharge learning needs (meds, wound care, etc)   Refer to discharge planning if patient needs post-hospital services based on physician order or complex needs related to functional status, cognitive ability or social support system   Arrange for needed discharge resources and transportation as appropriate  7/8/2024 0055 by Niya Helms, RN  Outcome: Progressing     Problem: Safety - Adult  Goal: Free from fall injury  7/8/2024 1036 by Daniel Jasso RN  Outcome: Progressing  Flowsheets (Taken 7/6/2024 2256 by Montserrat Jackson RN)  Free From Fall Injury: Instruct family/caregiver on patient safety  7/8/2024 0055 by Niya Helms, RN  Outcome: Progressing     Problem: Skin/Tissue Integrity  Goal: Absence of new skin breakdown  Description: 1.  Monitor for areas of redness and/or skin breakdown  2.  Assess vascular access sites hourly  3.  Every 4-6 hours minimum:  Change oxygen saturation probe site  4.  Every 4-6 hours:  If on nasal continuous positive airway pressure, respiratory therapy assess nares and determine need for appliance change or resting period.  7/8/2024 1036 by Daniel Jasso RN  Outcome: Progressing  7/8/2024 0055 by Niya Helms, RN  Outcome: Progressing     Problem: Pain  Goal: Verbalizes/displays adequate comfort level or baseline comfort level  7/8/2024 1036 by Daniel Jasso RN  Outcome: Progressing  Flowsheets (Taken 7/7/2024 0953)  Verbalizes/displays adequate comfort level or baseline comfort level:   Encourage patient to monitor pain and request assistance   Administer analgesics based on type and severity of pain and evaluate response    Consider cultural and social influences on pain and pain management   Assess pain using appropriate pain scale   Notify Licensed Independent Practitioner if interventions unsuccessful or patient reports new pain   Implement non-pharmacological measures as appropriate and evaluate response  7/8/2024 0055 by Niya Helms, RN  Outcome: Progressing     Problem: Skin/Tissue Integrity - Adult  Goal: Skin integrity remains intact  7/8/2024 1036 by Daniel Jasso RN  Outcome: Progressing  Flowsheets (Taken 7/8/2024 1036)  Skin Integrity Remains Intact:   Monitor for areas of redness and/or skin breakdown   Assess vascular access sites hourly  7/8/2024 0055 by Niya Helms, RN  Outcome: Progressing  Goal: Incisions, wounds, or drain sites healing without S/S of infection  Outcome: Progressing  Flowsheets (Taken 7/8/2024 1036)  Incisions, Wounds, or Drain Sites Healing Without Sign and Symptoms of Infection:   ADMISSION and DAILY: Assess and document risk factors for pressure ulcer development   TWICE DAILY: Assess and document skin integrity   TWICE DAILY: Assess and document dressing/incision, wound bed, drain sites and surrounding tissue   Implement wound care per orders     Problem: Chronic Conditions and Co-morbidities  Goal: Patient's chronic conditions and co-morbidity symptoms are monitored and maintained or improved  Outcome: Progressing  Flowsheets (Taken 7/8/2024 1036)  Care Plan - Patient's Chronic Conditions and Co-Morbidity Symptoms are Monitored and Maintained or Improved:   Collaborate with multidisciplinary team to address chronic and comorbid conditions and prevent exacerbation or deterioration   Monitor and assess patient's chronic conditions and comorbid symptoms for stability, deterioration, or improvement   Update acute care plan with appropriate goals if chronic or comorbid symptoms are exacerbated and prevent overall improvement and discharge     Problem: Nutrition Deficit:  Goal:  Optimize nutritional status  Outcome: Progressing  Flowsheets (Taken 7/8/2024 1036)  Nutrient intake appropriate for improving, restoring, or maintaining nutritional needs:   Assess nutritional status and recommend course of action   Monitor oral intake, labs, and treatment plans

## 2024-07-08 NOTE — CARE COORDINATION
7/8/24, 7:20 AM EDT    DISCHARGE ON GOING EVALUATION    [facility] day: 7  Location: 7-12/012-A Reason for admit: Type 2 diabetes mellitus with right diabetic foot ulcer (HCC) [E11.621, L97.519]  Anemia, unspecified type [D64.9]  Leukocytosis, unspecified type [D72.829]  Non-healing open wound of heel, right, initial encounter [S91.301A]     Procedures:   7/5 Echo: normal left ventricular systolic function with a visually estimated EF of 55 to 60%   7/5 by Podiatry  -Excisional debridement of wound, right heel, 12 x 7 cm  -Partial calcanectomy, right  -Application of wound VAC, right foot     Imaging since last note: na    Barriers to Discharge: POD 3, wound VAC change today, Zosyn IV q 8 hrs, follow labs. Podiatry and hospitalist  Preliminary growth gram + bacilli consistent with cardiovascular Enterococcus. Klebsiella pneumonia. Needs PT/OT.  PCP: Taylor Maza APRN - NP  Readmission Risk Score: 11.6    Patient Goals/Plan/Treatment Preferences: from home with family; agreeable to SNF now. SW consulted.

## 2024-07-08 NOTE — PROGRESS NOTES
Progress note: Infectious diseases    Patient - Niya Blankenship,  Age - 74 y.o.    - 1950      Room Number - 7K-12/012-A   MRN -  [id number]   [facility] # - [id number]  Date of Admission -  2024  1:19 PM    SUBJECTIVE:   No new issues  Wound cx +ve for ESBL producing bacteria  OBJECTIVE   VITALS    height is 1.626 m (5' 4\") and weight is 59 kg (130 lb). Her temperature is 97.5 °F (36.4 °C). Her blood pressure is 126/68 and her pulse is 88. Her respiration is 16 and oxygen saturation is 98%.       Wt Readings from Last 3 Encounters:   24 59 kg (130 lb)   21 70.3 kg (155 lb)       I/O (24 Hours)    Intake/Output Summary (Last 24 hours) at 2024 1133  Last data filed at 2024 0840  Gross per 24 hour   Intake 2000 ml   Output 1300 ml   Net 700 ml         General Appearance  Awake, alert, oriented,  not  In acute distress  HEENT - normocephalic, atraumatic, pale  conjunctiva,  anicteric sclera  Neck - Supple, no mass  Lungs -  Bilateral  air entry, no rhonchi, no wheeze  Cardiovascular - Heart sounds are normal.    Abdomen - soft, not distended, nontender,   Neurologic -oriented  Skin - No bruising or bleeding  Extremities -  full thickness open right foot wound, the calcaneous is removed, has exposed bone and there is periwound necrotic wound    MEDICATIONS:      meropenem  1,000 mg IntraVENous Q8H    amLODIPine  2.5 mg Oral Daily    insulin lispro  5 Units SubCUTAneous TID     insulin glargine  10 Units SubCUTAneous Nightly    insulin lispro  0-16 Units SubCUTAneous Q4H    lisinopril  40 mg Oral Daily    potassium chloride  40 mEq Oral Once    sodium chloride flush  5-40 mL IntraVENous 2 times per day    enoxaparin  40 mg SubCUTAneous Daily    escitalopram  10 mg Oral Daily      sodium chloride      dextrose       hydrOXYzine pamoate, hydrALAZINE, sodium chloride flush, sodium chloride, potassium chloride

## 2024-07-08 NOTE — PROGRESS NOTES
[facility]ound Ostomy Continence Nurse  Progress Note       NAME:  Niya Blankenship  MEDICAL RECORD NUMBER:  [id number]  AGE: 74 y.o.   GENDER: female  : 1950  TODAY'S DATE:  2024    Subjective   Reason for WOCN Evaluation and Assessment: wound vac to right heel  Please apply wound vac to right heel s/p right heel partial calcenectomy . Please remove and reapply adaptic to heel bone     Please change and reapply MWF      Niya Blankenship is a 74 y.o. female referred by:   [x] Physician/ PA/ APRN-CNP  [] Nursing  [] Other:     Wound Identification:  Wound Type: diabetic and neuropathic  ulcer  Wound Location: right heel  Modifying factors:  infection     Patient Goal of Care:  [x] Wound Healing  [x] Drainage Management   [] Odor Control  [] Palliative Care  [x] Pain Control         Objective     Tim Risk Score: Tim Scale Score: 19    Assessment     Encounter: Wound ostomy present to room for reapplication of wound vac therapy to right heel ulcer per order from Dr Phelan. Patient in bed upon arrival. Dr Cade at bedside.  Removed old  dressing. Wound photo and assessment to follow. Cleansed wound with normal saline and gauze. Applied skin prep to rohan wound. Applied stoma wafer to rohan wound to protect good skin. Applied cuticerin to protect exposed bone. Applied cut-to-fit black foam x 1 piece to wound bed followed by clear drape. Cut quarter size hole in center of drape over sponge area and apply vac suction. Wound vac settings at 125 mmHg continuous suction. Right ankle wrapped with kerlix and ACE, secured with tape. Staff to apply extra clear drape as needed if leaks noted and to change canister as needed when full. Wound ostomy to change wound vac three times weekly. Will continue to follow patient. Bed in low, call light in reach.    Photo:     Wound type: Right heel DM ulcer  Wound size: 11cm x 6.5cm x 1.1cm  Undermining or Tunneling: None  Wound assessment/color: Slough/ necrotic, bone, pale

## 2024-07-08 NOTE — PROGRESS NOTES
[facility]  INPATIENT OCCUPATIONAL THERAPY  [facility] 7K  EVALUATION    Time:   Time In: 930  Time Out: 1003  Timed Code Treatment Minutes: 23 Minutes  Minutes: 33          Date: 2024  Patient Name: Niya Blankenship,   Gender: female      MRN: [id number]  : 1950  (74 y.o.)  Referring Practitioner: Josefina Loya MD  Diagnosis: Diabetic ulcer of right heel associated with type 2 diabetes mellitus  Additional Pertinent Hx: Per EMR: 74 y.o. female who presented to [facility] with ?Pmhx of non-insulin dependent DM2, diabetic peripheral neuropathy and HTN who presented to the ED with gradually worsening R heel ulcer.  Her symptoms began in 2025 which were managed outpatient with various at home remedies including foot baths and home remedy oil with minimal improvement.  Her ulcer gradually grew from a small spot to overtaking her heel.  During 2024, she was hospitalized and underwent a R heel debridement. She began seeing Dr. Phelan in [place] for wound care and had been scheduled for a staged calcaneus amputation for 24.  Today she presents with a right heel decubitus foot ulcer without sensations of pain or tingling. Pt s/p Excisional debridement of wound, right heel, 12 x 7 cm  -Partial calcanectomy, right  -Application of wound VAC, right foot on 24    Restrictions/Precautions:  Restrictions/Precautions: Weight Bearing, Fall Risk  Right Lower Extremity Weight Bearing: Non Weight Bearing    Subjective  Chart Reviewed: Yes, Orders, Progress Notes, History and Physical, Imaging, Operative Notes  Patient assessed for rehabilitation services?: Yes  Family / Caregiver Present: Yes (son, granddaughter)    Subjective: Pt sidelying in bed upon arrival, agreeable to OT session with minimal encouragement. Some confusion/disorientation noted. Pt reporting overall fatigue and frequently stating \"I just want to go home.\"    Pain: 0/10: =    Vitals: Vitals not  assessed per clinical judgement, see nursing flowsheet    Social/Functional History:  Lives With: Daughter (and her family; pt lives in [place] [place] so has multiple family members able to assist)  Type of Home: House  Home Layout: One level  Home Access: Ramped entrance  Home Equipment: Rollator, Wheelchair - Manual   Bathroom Equipment: Commode       ADL Assistance: Independent  Homemaking Assistance: Needs assistance (family completes)  Ambulation Assistance: Non-ambulatory (has been nonambulatory since 01/24 due to heel wound; uses w/c for mobility)  Transfer Assistance: Independent    Active : No  Patient's  Info: family via horse and buggy or hire a  for long distance          VISION:WFL    HEARING:  WFL    COGNITION: Decreased Recall, Impaired Memory, Decreased Problem Solving, Decreased Safety Awareness, and Impulsive    RANGE OF MOTION:  Right Upper Extremity: WFL  Left Upper Extremity:  Impaired - decreased at L shoulder to ~70 degrees; distally WFL. Son reported pt had fall few months back and landed on L side, reported has had decreased shoulder ROM since although denies pain throughout; With assist from OTR, pt able to achieve close to full AAROM at L shoulder    STRENGTH:  Bilateral Upper Extremity:  Impaired - grossly deconditioned    SENSATION:   **has peripheral neuropathy from DM. Poor sensation in lower legs/feet    ADL:   Footwear Management: Moderate Assistance.  Donning L sock .  **pt reported completed all other ADLs earlier this date.  **With simulated clothing mgmt task in standing, pt required moderate cues to maintain NWB to RLE while demoing brief (<3 second) unilateral release from RW    IADL:   Not Tested    BALANCE:  Sitting Balance:  Supervision.    Standing Balance: Contact Guard Assistance.      BED MOBILITY:  Supine to Sit: Minimal Assistance, with head of bed raised, with verbal cues , with increased time for completion    Sit to Supine: Minimal Assistance,  with head of bed flat, with verbal cues , with increased time for completion    Scooting: Stand By Assistance, with increased time for completion ; advancing hips forward to EOB. Pt demoes decreased compliance with NWB when attempting to scoot self further back into bed before returning to supine.    TRANSFERS:  Sit to Stand:  Contact Guard Assistance, with increased time for completion, cues for hand placement, with verbal cues.    Stand to Sit: Contact Guard Assistance, with increased time for completion, cues for hand placement, with verbal cues.    Stand Pivot: Contact Guard Assistance, with increased time for completion, cues for hand placement, with verbal cues.      FUNCTIONAL MOBILITY:  OTR to further assess as able/appropriate  **Attempted to have pt carryover \"hopping\" technique for functional mobility after initial demo provided. Pt able to adhere to NWB with static standing, although when attempts to hop, pt unable to maintain NWB despite max cues, thus attempts at fxnl mobility then terminated    AM-PAC Inpatient Daily Activity Raw Score: 17  AM-PAC Inpatient ADL T-Scale Score : 37.26  ADL Inpatient CMS 0-100% Score: 50.11    Activity Tolerance:  Patient tolerance of  treatment: Fair treatment tolerance, Limited by fatigue, and Reduced activity pace       Modified Nuckolls Scale:  Not Applicable    Assessment:  Assessment: Pt presents requiring increased assistance for ADLs, transfers, and functional mobility compared to PLOF. Pt will continue to benefit from OT services to improve independence with these tasks, in addition to overall strength/endurance to facilitate return to PLOF.     Performance deficits / Impairments: Decreased functional mobility , Decreased ADL status, Decreased ROM, Decreased strength, Decreased cognition, Decreased endurance, Decreased safe awareness, Decreased balance  Prognosis: Fair  REQUIRES OT FOLLOW-UP: Yes  Decision Making: Medium Complexity    Treatment Initiated:

## 2024-07-08 NOTE — PROGRESS NOTES
Podiatric Progress Note  Niya Blankenship  Subjective :     7/8   Patient seen bedside today on behalf of Dr. Phelan.  Patient alert and oriented to person, place, time and event.  Patient is s/p 3 days partial calcanectomy and debridement.  Patient has family present at bedside.  She denies any pain to her foot this morning or issues with back over the weekend.  Patient denies any constitutional symptoms.  No other pedal concerns.    7/6  Patient seen bedside today on behalf of Dr Phelan. Patient is alert and oriented to person, place, time and event. 1 day s/p partial calcanectomy and debridement. Patient's family is present at bedside. Denies pain to her foot. Wound VAC has been working well overnight. Denies any constitutional symptoms. No other pedal concerns.    7/4/24  Progress patient was seen bedside today on behalf of Dr. Phelan.  Patient is alert and oriented to person, place, time and event.  Patient's family is present at bedside.  Patient states that she does not have any pain this morning and had a good night sleep.  Patient is understanding that she will have her procedure tomorrow of partial calcanectomy with wound debridement.  Patient denies any nausea, fever, cough, chills, shortness of breath or chest pain.  Patient denies any other pedal concerns at this time.    7/3/24  Patient was seen bedside today with Dr. Phelan.  Patient is alert and oriented to person, place, time, and event. Patient's family is present at bedside. Patient relates she is feeling well overall and denies pain to the right lower extremity. Patient states she had a good night's sleep last night. Patient's family states that the patient was coughing last night and she did vomit. Patient denies nausea, vomiting, fever, chills, shortness of breath, or chest pain at this time. Denies any other pedal complaints at this time.    7/2/24  Patient was seen bedside today alongside Dr. Phelan.  Patient relates minimal pain to the right lower  \"Excellent pulsatility seen throughout both legs...there is no evidence to suggest significant arterial stenosis.\"  - X-rays and MRI reviewed, noted involvement of the plantar aspect of the calcaneus with lytic destruction.  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  Question involvement of the talus as well.  - Continue IV Antibiotics per primary.  - Wound VAC left intact this visit  - Weight bearing status: Nonweightbearing RLE.  - Discussed importance of nutritional status, ordered high protein oral supplement  - Patient is agreeable to SNF placement, precert pending.  - Consult placed to wound ostomy for reapplication of wound VAC to right heel with status post partial calcanectomy  - Patient family expressed gratitude with discussion and would like some time to think over the options.  - Podiatry will continue to follow    DISPO: Pending facility placement, reapplication of wound VAC for per wound ostomy.    Ludwig Wong D.P.M. PGY 2  7/8/2024   7:03 AM

## 2024-07-08 NOTE — PROGRESS NOTES
[facility]  INPATIENT PHYSICAL THERAPY  EVALUATION  [facility] ORTHOPEDICS 7K - 7K-12/012-A    Time In: 1402  Time Out: 1439  Timed Code Treatment Minutes: 25 Minutes  Minutes: 37          Date: 2024  Patient Name: Niya Blankenship,  Gender:  female        MRN: [id number]  : 1950  (74 y.o.)      Referring Practitioner: Josefina Loya MD  Diagnosis: Diabetic ulcer of right heel associated with type 2 diabetes mellitus        Restrictions/Precautions:  Restrictions/Precautions: Weight Bearing, Fall Risk  Right Lower Extremity Weight Bearing: Non Weight Bearing  Position Activity Restriction  Other position/activity restrictions: wound vac R heel    Subjective:  Chart Reviewed: Yes  Patient assessed for rehabilitation services?: Yes  Subjective: Pt resting in bed and agreed to therapy and reports needing to use BSC.    General:     Vision: Within Functional Limits  Hearing: Within functional limits       Pain: denied      Vitals: Vitals not assessed per clinical judgement, see nursing flowsheet    Social/Functional History:    Lives With: Daughter (and her family; pt lives in [facility] so has multiple family members able to assist)  Type of Home: House  Home Layout: One level  Home Access: Ramped entrance  Home Equipment: Rollator, Wheelchair - Manual     Bathroom Equipment: Commode       ADL Assistance: Independent  Homemaking Assistance: Needs assistance (family completes)  Ambulation Assistance: Non-ambulatory (has been nonambulatory since  due to heel wound; uses w/c for mobility)  Transfer Assistance: Independent    Active : No          OBJECTIVE:  Range of Motion:  Bilateral Lower Extremity: WFL    Strength:  Bilateral Lower Extremity: grossly 4-/5    Balance:  Static Sitting Balance:  Stand By Assistance  Dynamic Sitting Balance: Stand By Assistance  Static Standing Balance: Contact Guard Assistance, with verbal cues , for NWB R LE and using walker  Dynamic Standing Balance:

## 2024-07-09 LAB
ANION GAP SERPL CALC-SCNC: 16 MEQ/L (ref 8–16)
BACTERIA SPEC AEROBE CULT: ABNORMAL
BACTERIA SPEC ANAEROBE CULT: ABNORMAL
BASOPHILS ABSOLUTE: 0.1 THOU/MM3 (ref 0–0.1)
BASOPHILS NFR BLD AUTO: 1.1 %
BUN SERPL-MCNC: 15 MG/DL (ref 7–22)
CALCIUM SERPL-MCNC: 8.7 MG/DL (ref 8.5–10.5)
CHLORIDE SERPL-SCNC: 94 MEQ/L (ref 98–111)
CO2 SERPL-SCNC: 24 MEQ/L (ref 23–33)
CREAT SERPL-MCNC: 0.9 MG/DL (ref 0.4–1.2)
DEPRECATED RDW RBC AUTO: 47.4 FL (ref 35–45)
EOSINOPHIL NFR BLD AUTO: 5.3 %
EOSINOPHILS ABSOLUTE: 0.4 THOU/MM3 (ref 0–0.4)
ERYTHROCYTE [DISTWIDTH] IN BLOOD BY AUTOMATED COUNT: 15.5 % (ref 11.5–14.5)
GFR SERPL CREATININE-BSD FRML MDRD: 67 ML/MIN/1.73M2
GLUCOSE BLD STRIP.AUTO-MCNC: 132 MG/DL (ref 70–108)
GLUCOSE BLD STRIP.AUTO-MCNC: 156 MG/DL (ref 70–108)
GLUCOSE BLD STRIP.AUTO-MCNC: 235 MG/DL (ref 70–108)
GLUCOSE BLD STRIP.AUTO-MCNC: 248 MG/DL (ref 70–108)
GLUCOSE BLD STRIP.AUTO-MCNC: 256 MG/DL (ref 70–108)
GLUCOSE BLD STRIP.AUTO-MCNC: 312 MG/DL (ref 70–108)
GLUCOSE SERPL-MCNC: 236 MG/DL (ref 70–108)
GRAM STN SPEC: ABNORMAL
HCT VFR BLD AUTO: 27.9 % (ref 37–47)
HGB BLD-MCNC: 8.3 GM/DL (ref 12–16)
IMM GRANULOCYTES # BLD AUTO: 0.07 THOU/MM3 (ref 0–0.07)
IMM GRANULOCYTES NFR BLD AUTO: 1 %
LYMPHOCYTES ABSOLUTE: 1.7 THOU/MM3 (ref 1–4.8)
LYMPHOCYTES NFR BLD AUTO: 24 %
MCH RBC QN AUTO: 25.4 PG (ref 26–33)
MCHC RBC AUTO-ENTMCNC: 29.7 GM/DL (ref 32.2–35.5)
MCV RBC AUTO: 85.3 FL (ref 81–99)
MONOCYTES ABSOLUTE: 0.8 THOU/MM3 (ref 0.4–1.3)
MONOCYTES NFR BLD AUTO: 10.8 %
NEUTROPHILS ABSOLUTE: 4 THOU/MM3 (ref 1.8–7.7)
NEUTROPHILS NFR BLD AUTO: 57.8 %
NRBC BLD AUTO-RTO: 0 /100 WBC
ORGANISM: ABNORMAL
PLATELET # BLD AUTO: 445 THOU/MM3 (ref 130–400)
PMV BLD AUTO: 10.3 FL (ref 9.4–12.4)
POTASSIUM SERPL-SCNC: 3.8 MEQ/L (ref 3.5–5.2)
RBC # BLD AUTO: 3.27 MILL/MM3 (ref 4.2–5.4)
SODIUM SERPL-SCNC: 134 MEQ/L (ref 135–145)
WBC # BLD AUTO: 7 THOU/MM3 (ref 4.8–10.8)

## 2024-07-09 PROCEDURE — 85025 COMPLETE CBC W/AUTO DIFF WBC: CPT

## 2024-07-09 PROCEDURE — 2580000003 HC RX 258: Performed by: INTERNAL MEDICINE

## 2024-07-09 PROCEDURE — 82948 REAGENT STRIP/BLOOD GLUCOSE: CPT

## 2024-07-09 PROCEDURE — 99232 SBSQ HOSP IP/OBS MODERATE 35: CPT | Performed by: FAMILY MEDICINE

## 2024-07-09 PROCEDURE — 6370000000 HC RX 637 (ALT 250 FOR IP)

## 2024-07-09 PROCEDURE — 80048 BASIC METABOLIC PNL TOTAL CA: CPT

## 2024-07-09 PROCEDURE — 6360000002 HC RX W HCPCS

## 2024-07-09 PROCEDURE — 2580000003 HC RX 258

## 2024-07-09 PROCEDURE — 6360000002 HC RX W HCPCS: Performed by: INTERNAL MEDICINE

## 2024-07-09 PROCEDURE — 1200000000 HC SEMI PRIVATE

## 2024-07-09 PROCEDURE — 1200000003 HC TELEMETRY R&B

## 2024-07-09 PROCEDURE — 36415 COLL VENOUS BLD VENIPUNCTURE: CPT

## 2024-07-09 RX ORDER — INSULIN GLARGINE 100 [IU]/ML
15 INJECTION, SOLUTION SUBCUTANEOUS NIGHTLY
Status: DISCONTINUED | OUTPATIENT
Start: 2024-07-09 | End: 2024-07-17 | Stop reason: HOSPADM

## 2024-07-09 RX ORDER — INSULIN LISPRO 100 [IU]/ML
10 INJECTION, SOLUTION INTRAVENOUS; SUBCUTANEOUS
Status: DISCONTINUED | OUTPATIENT
Start: 2024-07-09 | End: 2024-07-17 | Stop reason: HOSPADM

## 2024-07-09 RX ADMIN — SODIUM CHLORIDE, PRESERVATIVE FREE 10 ML: 5 INJECTION INTRAVENOUS at 08:44

## 2024-07-09 RX ADMIN — ESCITALOPRAM OXALATE 10 MG: 10 TABLET, FILM COATED ORAL at 08:45

## 2024-07-09 RX ADMIN — SODIUM CHLORIDE, PRESERVATIVE FREE 10 ML: 5 INJECTION INTRAVENOUS at 20:34

## 2024-07-09 RX ADMIN — MEROPENEM 1000 MG: 1 INJECTION INTRAVENOUS at 12:30

## 2024-07-09 RX ADMIN — AMLODIPINE BESYLATE 2.5 MG: 2.5 TABLET ORAL at 08:45

## 2024-07-09 RX ADMIN — ENOXAPARIN SODIUM 40 MG: 100 INJECTION SUBCUTANEOUS at 08:45

## 2024-07-09 RX ADMIN — INSULIN LISPRO 2 UNITS: 100 INJECTION, SOLUTION INTRAVENOUS; SUBCUTANEOUS at 08:45

## 2024-07-09 RX ADMIN — INSULIN LISPRO 10 UNITS: 100 INJECTION, SOLUTION INTRAVENOUS; SUBCUTANEOUS at 12:26

## 2024-07-09 RX ADMIN — INSULIN GLARGINE 15 UNITS: 100 INJECTION, SOLUTION SUBCUTANEOUS at 20:34

## 2024-07-09 RX ADMIN — INSULIN LISPRO 10 UNITS: 100 INJECTION, SOLUTION INTRAVENOUS; SUBCUTANEOUS at 16:34

## 2024-07-09 RX ADMIN — LISINOPRIL 40 MG: 40 TABLET ORAL at 08:44

## 2024-07-09 RX ADMIN — MEROPENEM 1000 MG: 1 INJECTION INTRAVENOUS at 00:24

## 2024-07-09 RX ADMIN — INSULIN LISPRO 8 UNITS: 100 INJECTION, SOLUTION INTRAVENOUS; SUBCUTANEOUS at 08:45

## 2024-07-09 RX ADMIN — INSULIN LISPRO 4 UNITS: 100 INJECTION, SOLUTION INTRAVENOUS; SUBCUTANEOUS at 12:27

## 2024-07-09 RX ADMIN — SODIUM CHLORIDE, PRESERVATIVE FREE 10 ML: 5 INJECTION INTRAVENOUS at 00:22

## 2024-07-09 RX ADMIN — SODIUM CHLORIDE, PRESERVATIVE FREE 10 ML: 5 INJECTION INTRAVENOUS at 03:32

## 2024-07-09 ASSESSMENT — PAIN SCALES - GENERAL: PAINLEVEL_OUTOF10: 0

## 2024-07-09 NOTE — PROGRESS NOTES
on 03/29 she underwent surgical debridement in the OR with Podiatry. Wound VAC was placed on right calcaneus and ankle . S/p augmentin for 4 weeks (end of treatment 04/25/2024 )     Sinus arrhythmia noted on pre-op EKG   -- EKG 7/4 initially documented as atrial fibrillation. EKG personally reviewed and appears to be sinus rhythm with occasional PACs. EKG reread as \"Sinus rhythm with marked sinus arrhythmia\"  -- Continue telemetry  -- No hx of Afib  -- This does not preclude patient from planned surgery 7/5  -- EKG 7/5: \"Sinus rhythm with Premature atrial complexes \"  -- Echo reviewed from 7/5 shows normal left ventricular systolic function with a visually estimated EF of 55 to 60%.  Left ventricular size normal.  Normal wall thickness.  Normal wall motion.     Acute on chronic anemia in setting of iron deficiency anemia and recent surgery  -- Morning of 7/2 hemoglobin dropped from 7.2 to 6.7.    -- Repeat H&H showed 7.2/23.2  -- Ferritin 903, iron 12, TIBC 127, % iron saturation 9, folate 13.6, vitamin B12 625  -- S/p 1u pRBCs on 7/3  -- 7/8 Hgb 8.7  -- Hemoglobin appears stable at this time as of 7/8 and believe that the acute drop in 1 g seen on 7/6 is secondary to blood loss from surgery  - Okay to resume Lovenox on 7/6  -- Will transfusion when Hgb <7 or signs of overt bleeding  -- Per care everywhere, EGD 3/27/24 no acute disease and pt was to follow with GI OP for colonoscopy     Type II diabetes not on chronic insulin  -- 7/30/22: A1c 12.4  -- 7/3/24: A1c 6.8  -- Beta-hydroxybutyrate 1.56  -- Lantus 7 was increased to 10 units nightly on 7/6, will continue at 10U and adjust as needed  - Medium dose sliding scale insulin  - On 7/6 added lispro 5 units 3 times daily with meals  -- on 7/8 sugar was noted to be 360, pt was given 17 units total. Will adjust to 8U TID and continue with lantus 10 and medium ISS  -- Home meds include: Metformin 1000 mg twice daily, Amaryl, will hold     Acute on chronic metabolic  []Observation    DVT Prophylaxis: [x] Lovenox / [] Heparin / [] SCDs / [] Already on Systemic Anticoagulation / [] None     PT/OT: [x]Yes / []No    Disposition:    [] Home       [] TCU       [] Rehab       [] Psych       [x] SNF       [] Long Term Care Facility       [] Other-    Code Status: DNR-CCA      An electronic signature was used to authenticate this note  - Josefina Loya MD PGY-2 on 7/8/2024 at 8:18 PM

## 2024-07-09 NOTE — PROGRESS NOTES
Podiatric Progress Note  Niya Blankenship  Subjective :   7/9   Patient seen bedside today alongside Dr. Phelan patient was alert oriented to person place time and event. Patient is status postop 4 days partial calcanectomy and debridement with placement of wound VAC.  Patient had family at bedside. Patient denies any pain to her foot patient said she feels great with no constitutional symptoms. Patient expressed her desire to be placed in a skilled nursing facility of her choice.  No other pedal complaints at this time.    7/8   Patient seen bedside today on behalf of Dr. Phelan.  Patient alert and oriented to person, place, time and event.  Patient is s/p 3 days partial calcanectomy and debridement.  Patient has family present at bedside.  She denies any pain to her foot this morning or issues with back over the weekend.  Patient denies any constitutional symptoms.  No other pedal concerns.    7/6  Patient seen bedside today on behalf of Dr Phelan. Patient is alert and oriented to person, place, time and event. 1 day s/p partial calcanectomy and debridement. Patient's family is present at bedside. Denies pain to her foot. Wound VAC has been working well overnight. Denies any constitutional symptoms. No other pedal concerns.    7/4/24  Progress patient was seen bedside today on behalf of Dr. Phelan.  Patient is alert and oriented to person, place, time and event.  Patient's family is present at bedside.  Patient states that she does not have any pain this morning and had a good night sleep.  Patient is understanding that she will have her procedure tomorrow of partial calcanectomy with wound debridement.  Patient denies any nausea, fever, cough, chills, shortness of breath or chest pain.  Patient denies any other pedal concerns at this time.    7/3/24  Patient was seen bedside today with Dr. Phelan.  Patient is alert and oriented to person, place, time, and event. Patient's family is present at bedside. Patient relates she is    Output --   Net 1165 ml              Wt Readings from Last 3 Encounters:   07/01/24 59 kg (130 lb)   04/01/21 70.3 kg (155 lb)       LABS:    Recent Labs     07/08/24  0734 07/09/24  0604   WBC 8.6 7.0   HGB 8.7* 8.3*   HCT 28.6* 27.9*   * 445*        Recent Labs     07/09/24  0604   *   K 3.8   CL 94*   CO2 24   BUN 15   CREATININE 0.9      No results for input(s): \"INR\", \"APTT\" in the last 72 hours.    Invalid input(s): \"PROT\"   No results for input(s): \"CKTOTAL\", \"CKMB\", \"CKMBINDEX\", \"TROPONINI\" in the last 72 hours.      Exam:     Vascular: From 7/4: Dorsalis pedis and posterior tibial pulses are dopplerable bilaterally. Skin temperature is warm to warm from proximal tibial tuberosity to distal digits. CFT brisk to exposed digits. Edema absent. Hair growth absent.     Dermatologic: 3 days s/p partial calcanectomy right heel..  Wound measures approximate 12 x 7 cm.  Centrally there is exposed calcaneus at the posterior aspect of the incision.  Circumferentially there is beefy healthy granular tissue extending into the plantar forefoot.  At this plantar forefoot region there is necrotic like tissue in nature with macerated elements to it.  All other skin edges are viable.  There is no drainage or malodor appreciated.       Neurovascular: Light touch and gross sensation diminished     Musculoskeletal: Muscle strength and ROM testing deferred. Pain with palpation of right heel. Foot and ankle in grossly rectus alignment.        ASSESSMENT  Principle  1.  Chronic right heel ulcer, bone exposure  2.  Diabetes with peripheral neuropath    Chronic  Patient Active Problem List   Diagnosis    Diabetic ulcer of right heel associated with type 2 diabetes mellitus (HCC)    Non-healing open wound of heel    Osteomyelitis of foot, right, acute (HCC)    Hypomagnesemia    Depression    Anxiety disorder    Sinus arrhythmia    Acute blood loss anemia    Chronic iron deficiency anemia    Type 2 diabetes mellitus  treated without insulin (HCC)    Metabolic encephalopathy    Essential hypertension    Thrombocytosis    Hypokalemia       PLAN:   - Pt. is a 74 y.o. female   - Patient was examined and evaluated  - 3 day s/p R partial calcanectomy and debridement  - WBC 7.0 , afebrile  - Glucose 256  -Culture anaerobic aerobic soft tissue right foot: Preliminary growth gram-positive bacilli consistent with cardiovascular Enterococcus.  Klebsiella pneumonia.  -- Arterial Dopplers ordered, reviewed: \"Excellent pulsatility seen throughout both legs...there is no evidence to suggest significant arterial stenosis.\"  - X-rays and MRI reviewed, noted involvement of the plantar aspect of the calcaneus with lytic destruction.  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  Question involvement of the talus as well.  - Continue IV Antibiotics per primary.  - Wound VAC left intact this visit  - Weight bearing status: Nonweightbearing RLE.  - Discussed importance of nutritional status, ordered high protein oral supplement  - Patient is agreeable to SNF placement, precert pending.  - Consult placed to wound ostomy for reapplication of wound VAC to right heel with status post partial calcanectomy    - Patient family expressed gratitude with with the outcome of the surgery.- Podiatry will continue to follow    DISPO: Pending facility placement, reapplication of wound VAC for per wound ostomy.    Michael Mccoy DPM  7/9/2024   1:53 PM

## 2024-07-09 NOTE — PLAN OF CARE
hourly  7/9/2024 0114 by Julee Mcduffie RN  Outcome: Progressing  Flowsheets  Taken 7/9/2024 0113  Skin Integrity Remains Intact:   Monitor for areas of redness and/or skin breakdown   Assess vascular access sites hourly  Taken 7/8/2024 2001  Skin Integrity Remains Intact:   Monitor for areas of redness and/or skin breakdown   Assess vascular access sites hourly  Goal: Incisions, wounds, or drain sites healing without S/S of infection  7/9/2024 1420 by Sonam Fair, RN  Outcome: Progressing  Flowsheets (Taken 7/9/2024 1420)  Incisions, Wounds, or Drain Sites Healing Without Sign and Symptoms of Infection: TWICE DAILY: Assess and document skin integrity  7/9/2024 0114 by Julee Mcduffie RN  Outcome: Progressing  Flowsheets  Taken 7/9/2024 0113  Incisions, Wounds, or Drain Sites Healing Without Sign and Symptoms of Infection: TWICE DAILY: Assess and document skin integrity  Taken 7/8/2024 2001  Incisions, Wounds, or Drain Sites Healing Without Sign and Symptoms of Infection: TWICE DAILY: Assess and document skin integrity     Problem: Chronic Conditions and Co-morbidities  Goal: Patient's chronic conditions and co-morbidity symptoms are monitored and maintained or improved  7/9/2024 1420 by Sonam Fair RN  Outcome: Progressing  Flowsheets (Taken 7/9/2024 1420)  Care Plan - Patient's Chronic Conditions and Co-Morbidity Symptoms are Monitored and Maintained or Improved: Monitor and assess patient's chronic conditions and comorbid symptoms for stability, deterioration, or improvement  7/9/2024 0114 by Julee Mcduffie RN  Outcome: Progressing  Flowsheets (Taken 7/8/2024 2001)  Care Plan - Patient's Chronic Conditions and Co-Morbidity Symptoms are Monitored and Maintained or Improved: Monitor and assess patient's chronic conditions and comorbid symptoms for stability, deterioration, or improvement     Problem: Nutrition Deficit:  Goal: Optimize nutritional status  7/9/2024 1420 by Sonam Fair RN  Outcome:  Progressing  Flowsheets (Taken 7/9/2024 1420)  Nutrient intake appropriate for improving, restoring, or maintaining nutritional needs: Assess nutritional status and recommend course of action  7/9/2024 0114 by Julee Mcduffie, RN  Outcome: Not Progressing  Note: Noncompliant with diabetic diet.  Drinks reg cola and candy that family brings in     Problem: Nutrition Deficit:  Goal: Optimize nutritional status  7/9/2024 1420 by Sonam Fair RN  Outcome: Progressing  Flowsheets (Taken 7/9/2024 1420)  Nutrient intake appropriate for improving, restoring, or maintaining nutritional needs: Assess nutritional status and recommend course of action  7/9/2024 0114 by Julee Mcduffie, RN  Outcome: Not Progressing  Note: Noncompliant with diabetic diet.  Drinks reg cola and candy that family brings in   Care plan reviewed with patient.  Patient verbalize understanding of the plan of care and contribute to goal setting.

## 2024-07-09 NOTE — PROGRESS NOTES
PROGRESS NOTE      Patient:  Niya Blankenship  Unit/Bed:7K-12/012-A  YOB: 1950  MRN: [id number]   Acct: [id number]    PCP: Taylor Maza APRN - NP    Date of Admission: 7/1/2024 LOS: 8    Date of Evaluation:  7/9/2024    Anticipated Discharge: pending placement     Assessment/Plan:    Osteomyelitis of right foot with leukocytosis  S/P excisional debridement of right heel and partial right calcanectomy on 7/5  -- MRI right foot 7/2: Soft tissue defect in the plantar soft tissues over the calcaneus. Abnormal signal intensity in the calcaneus consistent with involvement by osteomyelitis. Abnormal signal intensity in the plantar soft tissues over the medial aspect   of the calcaneus consistent with inflammatory process. There was air in the soft tissues seen on the plain radiographs. Degenerative changes. Osteochondral defect in the talar dome. Spurring at the attachment of the Achilles tendon upon the calcaneus. Soft tissue swelling over the foot.   -- X-ray right foot 7/1: No fracture or dislocation   -- Podiatry following, continue to appreciate recommendations.   -- Taken to the OR on 7/5 for right heel partial calcanectomy and debridement of wound.  Patient tolerated procedure well. Continue wound vac. Pt and family agreeable to SNF.   -- Continue daily dressing   -- MRSA negative   -- ID following, appreciate recommendations  -- S/p vancomycin (7/1-7/3)  -- s/p Zosyn (7/1-7/5). Pt initially wanting to transition to po Abx, but as pt is now amenable to SNF at [place] may consider contining IV Abx at [place] vs oral Abx. Will follow up ID for recs.    -- blood culture x 2 NG 5D  -- Heel culture grew Pseudomonas aeruginosa   --Bone and foot cx: probable ESBL producing Klebsiella, Strep agalactiae and Enterococcus avium   -- Started on meropenem per ID (7/8-  -- Leukocytosis improving.  White blood cell count is 7.0 on 7/9  -- CRP 15.80 on 7/2  -- Of note, Per chart review, Patient underwent bedside debridement    Excellent pulsatility throughout both legs.            **This report has been created using voice recognition software.  It may contain   minor errors which are inherent in voice recognition technology.**            Electronically signed by Dr. John Wu      MRI FOOT RIGHT WO CONTRAST   Final Result   1. Soft tissue defect in the plantar soft tissues over the calcaneus.   2. Abnormal signal intensity in the calcaneus consistent with involvement by   osteomyelitis.   3. Abnormal signal intensity in the plantar soft tissues over the medial aspect   of the calcaneus consistent with inflammatory process. There was air in the soft   tissues seen on the plain radiographs.   4. Degenerative changes.   5. Osteochondral defect in the talar dome.   6. Spurring at the attachment of the Achilles tendon upon the calcaneus.   7. Soft tissue swelling over the foot.               **This report has been created using voice recognition software. It may contain   minor errors which are inherent in voice recognition technology.**         Electronically signed by Dr. Jayne Terry      XR FOOT RIGHT (MIN 3 VIEWS)   Final Result      No fracture or dislocation.            **This report has been created using voice recognition software. It may contain   minor errors which are inherent in voice recognition technology.**         Electronically signed by Dr. Yung Walker          Diet: ADULT ORAL NUTRITION SUPPLEMENT; Dinner; Wound Healing Oral Supplement  ADULT DIET; Regular; 5 carb choices (75 gm/meal)    Microbiology: yes - reviewed  Antibiotics: yes - continue    Steroids: no    Telemetry: []Yes / [x]No  Telemetry Review of past 24 hours: Not Applicable    LDA: []CVC / []PICC / [x]Midline / []Sears / []Drains / []Mediport / []PIV / []None    Labs (still needed?): [x]Yes / []No  IVF (still needed?): []Yes / [x]No    Level of care: []Step Down / [x]Med-Surg  Bed Status: [x]Inpatient / []Observation    DVT Prophylaxis: [x] Lovenox

## 2024-07-09 NOTE — PROGRESS NOTES
Teaching Note:    I was present with the resident during the visit.  I discussed the case with the resident and agree with the findings and the plan as documented in the residents note.    Cb Phelan DPM, FACFAS  Podiatric Medicine & Surgery       [facility]

## 2024-07-09 NOTE — PROGRESS NOTES
[facility]  PHYSICAL THERAPY MISSED TREATMENT NOTE  [facility]    Date: 2024  Patient Name: Niya Blankenship        MRN: [id number]   : 1950  (74 y.o.)  Gender: female   Referring Practitioner: Josefina Loya MD  Diagnosis: Diabetic ulcer of right heel associated with type 2 diabetes mellitus         REASON FOR MISSED TREATMENT:  Missed Treat.  RN ok'ed session. Patient was sleeping in bed upon arrival. Family was present and requested to leave patient resting at this time as they voiced patient was quickly fatigued after breakfast. PT to attempt to see at next available date as time allows and as willing.

## 2024-07-09 NOTE — PROGRESS NOTES
Pharmacy Renal Adjustment    Pharmacy renally adjusted the following medication(s) per P&T approved policy: meropenem    Recent Labs     07/07/24  0734 07/08/24  0734   BUN 13 11   CREATININE 1.0 0.9     Estimated Creatinine Clearance: 47 mL/min (based on SCr of 0.9 mg/dL).    Assessment:  N/A    Plan:   Decrease Merrem from 1 g IV Q8H to Q12H    Please call pharmacy with any questions.    Elizabeth Salgado, PharmD, BCPS  7/9/2024  7:12 AM

## 2024-07-09 NOTE — PLAN OF CARE
Problem: Discharge Planning  Goal: Discharge to home or other facility with appropriate resources  7/9/2024 0114 by Julee Mcduffie RN  Outcome: Progressing  Flowsheets (Taken 7/8/2024 2001)  Discharge to home or other facility with appropriate resources: Identify barriers to discharge with patient and caregiver     Problem: Safety - Adult  Goal: Free from fall injury  Outcome: Progressing     Problem: Skin/Tissue Integrity  Goal: Absence of new skin breakdown  Description: 1.  Monitor for areas of redness and/or skin breakdown  2.  Assess vascular access sites hourly  3.  Every 4-6 hours minimum:  Change oxygen saturation probe site  4.  Every 4-6 hours:  If on nasal continuous positive airway pressure, respiratory therapy assess nares and determine need for appliance change or resting period.  Outcome: Progressing     Problem: Pain  Goal: Verbalizes/displays adequate comfort level or baseline comfort level  Outcome: Progressing     Problem: Skin/Tissue Integrity - Adult  Goal: Skin integrity remains intact  Outcome: Progressing  Flowsheets  Taken 7/9/2024 0113  Skin Integrity Remains Intact:   Monitor for areas of redness and/or skin breakdown   Assess vascular access sites hourly  Taken 7/8/2024 2001  Skin Integrity Remains Intact:   Monitor for areas of redness and/or skin breakdown   Assess vascular access sites hourly     Problem: Skin/Tissue Integrity - Adult  Goal: Incisions, wounds, or drain sites healing without S/S of infection  Outcome: Progressing  Flowsheets  Taken 7/9/2024 0113  Incisions, Wounds, or Drain Sites Healing Without Sign and Symptoms of Infection: TWICE DAILY: Assess and document skin integrity  Taken 7/8/2024 2001  Incisions, Wounds, or Drain Sites Healing Without Sign and Symptoms of Infection: TWICE DAILY: Assess and document skin integrity     Problem: Chronic Conditions and Co-morbidities  Goal: Patient's chronic conditions and co-morbidity symptoms are monitored and maintained or  improved  Outcome: Progressing  Flowsheets (Taken 7/8/2024 2001)  Care Plan - Patient's Chronic Conditions and Co-Morbidity Symptoms are Monitored and Maintained or Improved: Monitor and assess patient's chronic conditions and comorbid symptoms for stability, deterioration, or improvement     Problem: Nutrition Deficit:  Goal: Optimize nutritional status  Outcome: Not Progressing  Note: Noncompliant with diabetic diet.  Drinks reg cola and candy that family brings in     Problem: Nutrition Deficit:  Goal: Optimize nutritional status  Outcome: Not Progressing  Note: Noncompliant with diabetic diet.  Drinks reg cola and candy that family brings in

## 2024-07-09 NOTE — PROGRESS NOTES
Progress note: Infectious diseases    Patient - Niya Blankenship,  Age - 74 y.o.    - 1950      Room Number - 7K-12/012-A   MRN -  [id number]   [facility] # - [id number]  Date of Admission -  2024  1:19 PM    SUBJECTIVE:   No new complaints  OBJECTIVE   VITALS    height is 1.626 m (5' 4\") and weight is 59 kg (130 lb). Her oral temperature is 99 °F (37.2 °C). Her blood pressure is 144/68 (abnormal) and her pulse is 91. Her respiration is 18 and oxygen saturation is 97%.       Wt Readings from Last 3 Encounters:   24 59 kg (130 lb)   21 70.3 kg (155 lb)       I/O (24 Hours)    Intake/Output Summary (Last 24 hours) at 2024 1320  Last data filed at 2024 1132  Gross per 24 hour   Intake 765 ml   Output --   Net 765 ml         General Appearance  Awake, alert, oriented,  not  In acute distress  HEENT - normocephalic, atraumatic, pale  conjunctiva,  anicteric sclera  Neck - Supple, no mass  Lungs -  Bilateral  air entry, no rhonchi, no wheeze  Cardiovascular - Heart sounds are normal.    Abdomen - soft, not distended, nontender,   Neurologic -oriented  Skin - No bruising or bleeding  Extremities -  wound vac to the right foot wound    MEDICATIONS:      meropenem  1,000 mg IntraVENous Q12H    insulin glargine  15 Units SubCUTAneous Nightly    insulin lispro  10 Units SubCUTAneous TID WC    amLODIPine  2.5 mg Oral Daily    insulin lispro  0-8 Units SubCUTAneous TID WC    insulin lispro  0-4 Units SubCUTAneous Nightly    lisinopril  40 mg Oral Daily    potassium chloride  40 mEq Oral Once    sodium chloride flush  5-40 mL IntraVENous 2 times per day    enoxaparin  40 mg SubCUTAneous Daily    escitalopram  10 mg Oral Daily      sodium chloride      dextrose       hydrOXYzine pamoate, hydrALAZINE, sodium chloride flush, sodium chloride, potassium chloride **OR** potassium alternative oral replacement **OR**  potassium chloride, ondansetron **OR** ondansetron, polyethylene glycol, acetaminophen **OR** acetaminophen, zinc oxide, glucose, dextrose bolus **OR** dextrose bolus, glucagon (rDNA), dextrose      LABS:     CBC:   Recent Labs     07/07/24  0734 07/08/24  0734 07/09/24  0604   WBC 9.7 8.6 7.0   HGB 8.1* 8.7* 8.3*   * 473* 445*       BMP:    Recent Labs     07/07/24  0734 07/08/24  0734 07/09/24  0604    139 134*   K 3.9 4.0 3.8   CL 99 97* 94*   CO2 31 29 24   BUN 13 11 15   CREATININE 1.0 0.9 0.9   GLUCOSE 123* 164* 236*       Calcium:  Recent Labs     07/09/24  0604   CALCIUM 8.7       Ionized Calcium:Invalid input(s): \"IONCA\"  Magnesium:  Recent Labs     07/07/24  0734   MG 1.5*       Phosphorus:No results for input(s): \"PHOS\" in the last 72 hours.  BNP:No results for input(s): \"BNP\" in the last 72 hours.  Glucose:  Recent Labs     07/09/24  0605 07/09/24  0803 07/09/24  1033   POCGLU 235* 248* 256*       HgbA1C:   No results for input(s): \"LABA1C\" in the last 72 hours.    INR: No results for input(s): \"INR\" in the last 72 hours.  Hepatic: No results for input(s): \"ALKPHOS\", \"ALT\", \"AST\", \"BILITOT\", \"BILIDIR\", \"LABALBU\" in the last 72 hours.    Invalid input(s): \"PROT\"  Amylase and Lipase:No results for input(s): \"LACTA\", \"AMYLASE\" in the last 72 hours.  Lactic Acid: No results for input(s): \"LACTA\" in the last 72 hours.  Troponin: No results for input(s): \"CKTOTAL\", \"CKMB\", \"TROPONINI\" in the last 72 hours.  BNP: No results for input(s): \"BNP\" in the last 72 hours.    CULTURES:   UA: No results for input(s): \"SPECGRAV\", \"PHUR\", \"COLORU\", \"CLARITYU\", \"MUCUS\", \"PROTEINU\", \"BLOODU\", \"RBCUA\", \"WBCUA\", \"BACTERIA\", \"NITRU\", \"GLUCOSEU\", \"BILIRUBINUR\", \"UROBILINOGEN\", \"KETUA\", \"LABCAST\", \"LABCASTTY\", \"AMORPHOS\" in the last 72 hours.    Invalid input(s): \"CRYSTALS\"  Micro:   Lab Results   Component Value Date/Time    BC  07/01/2024 02:34 PM     No growth 24 hours. No growth 48 hours. No growth at 5 days         Problem list of patient:     Patient Active Problem List   Diagnosis Code    Diabetic ulcer of right heel associated with type 2 diabetes mellitus ([facility]) E11.621, L97.419    Non-healing open wound of heel S91.309A    Osteomyelitis of foot, right, acute ([facility]) M86.171    Hypomagnesemia E83.42    Depression F32.A    Anxiety disorder F41.9    Sinus arrhythmia I49.8    Acute blood loss anemia D62    Chronic iron deficiency anemia D50.9    Type 2 diabetes mellitus treated without insulin ([facility]) E11.9    Metabolic encephalopathy G93.41    Essential hypertension I10    Thrombocytosis D75.839    Hypokalemia E87.6         ASSESSMENT/PLAN   Right heel osteomyelitis of the calcaneus with skin necrosis and gas formation  Diabetes mellitus with neuropathy  She has refused amputation.   Started on meropenem due to ESBL   I do not think the wound/foot is salvageable     Ministerio Cade MD, 7/9/2024 1:20 PM

## 2024-07-10 LAB
ANION GAP SERPL CALC-SCNC: 9 MEQ/L (ref 8–16)
BASOPHILS ABSOLUTE: 0.1 THOU/MM3 (ref 0–0.1)
BASOPHILS NFR BLD AUTO: 1.4 %
BUN SERPL-MCNC: 16 MG/DL (ref 7–22)
CALCIUM SERPL-MCNC: 8.9 MG/DL (ref 8.5–10.5)
CHLORIDE SERPL-SCNC: 99 MEQ/L (ref 98–111)
CO2 SERPL-SCNC: 28 MEQ/L (ref 23–33)
CREAT SERPL-MCNC: 0.8 MG/DL (ref 0.4–1.2)
DEPRECATED RDW RBC AUTO: 46.9 FL (ref 35–45)
EOSINOPHIL NFR BLD AUTO: 6.1 %
EOSINOPHILS ABSOLUTE: 0.4 THOU/MM3 (ref 0–0.4)
ERYTHROCYTE [DISTWIDTH] IN BLOOD BY AUTOMATED COUNT: 15.6 % (ref 11.5–14.5)
GFR SERPL CREATININE-BSD FRML MDRD: 77 ML/MIN/1.73M2
GLUCOSE BLD STRIP.AUTO-MCNC: 168 MG/DL (ref 70–108)
GLUCOSE BLD STRIP.AUTO-MCNC: 180 MG/DL (ref 70–108)
GLUCOSE BLD STRIP.AUTO-MCNC: 302 MG/DL (ref 70–108)
GLUCOSE BLD STRIP.AUTO-MCNC: 326 MG/DL (ref 70–108)
GLUCOSE SERPL-MCNC: 157 MG/DL (ref 70–108)
HCT VFR BLD AUTO: 28.3 % (ref 37–47)
HGB BLD-MCNC: 8.7 GM/DL (ref 12–16)
IMM GRANULOCYTES # BLD AUTO: 0.09 THOU/MM3 (ref 0–0.07)
IMM GRANULOCYTES NFR BLD AUTO: 1.2 %
LYMPHOCYTES ABSOLUTE: 2.3 THOU/MM3 (ref 1–4.8)
LYMPHOCYTES NFR BLD AUTO: 31.9 %
MCH RBC QN AUTO: 25.5 PG (ref 26–33)
MCHC RBC AUTO-ENTMCNC: 30.7 GM/DL (ref 32.2–35.5)
MCV RBC AUTO: 83 FL (ref 81–99)
MONOCYTES ABSOLUTE: 0.8 THOU/MM3 (ref 0.4–1.3)
MONOCYTES NFR BLD AUTO: 11.2 %
NEUTROPHILS ABSOLUTE: 3.5 THOU/MM3 (ref 1.8–7.7)
NEUTROPHILS NFR BLD AUTO: 48.2 %
NRBC BLD AUTO-RTO: 0 /100 WBC
PLATELET # BLD AUTO: 497 THOU/MM3 (ref 130–400)
PMV BLD AUTO: 10.1 FL (ref 9.4–12.4)
POTASSIUM SERPL-SCNC: 3.7 MEQ/L (ref 3.5–5.2)
RBC # BLD AUTO: 3.41 MILL/MM3 (ref 4.2–5.4)
SODIUM SERPL-SCNC: 136 MEQ/L (ref 135–145)
WBC # BLD AUTO: 7.2 THOU/MM3 (ref 4.8–10.8)

## 2024-07-10 PROCEDURE — 6370000000 HC RX 637 (ALT 250 FOR IP)

## 2024-07-10 PROCEDURE — 2580000003 HC RX 258

## 2024-07-10 PROCEDURE — 97110 THERAPEUTIC EXERCISES: CPT

## 2024-07-10 PROCEDURE — 2580000003 HC RX 258: Performed by: INTERNAL MEDICINE

## 2024-07-10 PROCEDURE — 97605 NEG PRS WND THER DME<=50SQCM: CPT

## 2024-07-10 PROCEDURE — 99233 SBSQ HOSP IP/OBS HIGH 50: CPT | Performed by: INTERNAL MEDICINE

## 2024-07-10 PROCEDURE — 1200000003 HC TELEMETRY R&B

## 2024-07-10 PROCEDURE — 97530 THERAPEUTIC ACTIVITIES: CPT

## 2024-07-10 PROCEDURE — 36415 COLL VENOUS BLD VENIPUNCTURE: CPT

## 2024-07-10 PROCEDURE — 6360000002 HC RX W HCPCS

## 2024-07-10 PROCEDURE — 80048 BASIC METABOLIC PNL TOTAL CA: CPT

## 2024-07-10 PROCEDURE — 82948 REAGENT STRIP/BLOOD GLUCOSE: CPT

## 2024-07-10 PROCEDURE — 97535 SELF CARE MNGMENT TRAINING: CPT

## 2024-07-10 PROCEDURE — 1200000000 HC SEMI PRIVATE

## 2024-07-10 PROCEDURE — 6360000002 HC RX W HCPCS: Performed by: INTERNAL MEDICINE

## 2024-07-10 PROCEDURE — 85025 COMPLETE CBC W/AUTO DIFF WBC: CPT

## 2024-07-10 RX ADMIN — INSULIN LISPRO 10 UNITS: 100 INJECTION, SOLUTION INTRAVENOUS; SUBCUTANEOUS at 13:24

## 2024-07-10 RX ADMIN — INSULIN LISPRO 10 UNITS: 100 INJECTION, SOLUTION INTRAVENOUS; SUBCUTANEOUS at 09:11

## 2024-07-10 RX ADMIN — SODIUM CHLORIDE, PRESERVATIVE FREE 10 ML: 5 INJECTION INTRAVENOUS at 20:54

## 2024-07-10 RX ADMIN — INSULIN LISPRO 10 UNITS: 100 INJECTION, SOLUTION INTRAVENOUS; SUBCUTANEOUS at 17:32

## 2024-07-10 RX ADMIN — AMLODIPINE BESYLATE 2.5 MG: 2.5 TABLET ORAL at 09:08

## 2024-07-10 RX ADMIN — MEROPENEM 1000 MG: 1 INJECTION INTRAVENOUS at 14:42

## 2024-07-10 RX ADMIN — ESCITALOPRAM OXALATE 10 MG: 10 TABLET, FILM COATED ORAL at 09:08

## 2024-07-10 RX ADMIN — INSULIN LISPRO 4 UNITS: 100 INJECTION, SOLUTION INTRAVENOUS; SUBCUTANEOUS at 20:52

## 2024-07-10 RX ADMIN — SODIUM CHLORIDE, PRESERVATIVE FREE 10 ML: 5 INJECTION INTRAVENOUS at 10:03

## 2024-07-10 RX ADMIN — INSULIN GLARGINE 15 UNITS: 100 INJECTION, SOLUTION SUBCUTANEOUS at 20:52

## 2024-07-10 RX ADMIN — INSULIN LISPRO 6 UNITS: 100 INJECTION, SOLUTION INTRAVENOUS; SUBCUTANEOUS at 17:33

## 2024-07-10 RX ADMIN — ACETAMINOPHEN 650 MG: 325 TABLET ORAL at 13:23

## 2024-07-10 RX ADMIN — MEROPENEM 1000 MG: 1 INJECTION INTRAVENOUS at 00:21

## 2024-07-10 RX ADMIN — ENOXAPARIN SODIUM 40 MG: 100 INJECTION SUBCUTANEOUS at 09:11

## 2024-07-10 RX ADMIN — LISINOPRIL 40 MG: 40 TABLET ORAL at 09:08

## 2024-07-10 RX ADMIN — HYDROXYZINE PAMOATE 25 MG: 25 CAPSULE ORAL at 22:59

## 2024-07-10 ASSESSMENT — PAIN DESCRIPTION - ORIENTATION: ORIENTATION: RIGHT

## 2024-07-10 ASSESSMENT — PAIN SCALES - GENERAL: PAINLEVEL_OUTOF10: 6

## 2024-07-10 ASSESSMENT — PAIN DESCRIPTION - LOCATION: LOCATION: FOOT

## 2024-07-10 ASSESSMENT — PAIN DESCRIPTION - DESCRIPTORS: DESCRIPTORS: ACHING

## 2024-07-10 NOTE — PROGRESS NOTES
PROGRESS NOTE      Patient:  Niya Blankenship  Unit/Bed:7K-12/012-A  YOB: 1950  MRN: [id number]   Acct: [id number]    PCP: Taylor Maza APRN - NP    Date of Admission: 7/1/2024 LOS: 9    Date of Evaluation:  7/10/2024    Anticipated Discharge: pending placement, SNF vs ?    Assessment/Plan:    Osteomyelitis of right foot with leukocytosis  S/P excisional debridement of right heel and partial right calcanectomy on 7/5  -- MRI right foot 7/2: Soft tissue defect in the plantar soft tissues over the calcaneus. Abnormal signal intensity in the calcaneus consistent with involvement by osteomyelitis. Abnormal signal intensity in the plantar soft tissues over the medial aspect   of the calcaneus consistent with inflammatory process. There was air in the soft tissues seen on the plain radiographs. Degenerative changes. Osteochondral defect in the talar dome. Spurring at the attachment of the Achilles tendon upon the calcaneus. Soft tissue swelling over the foot.   -- X-ray right foot 7/1: No fracture or dislocation   -- Podiatry following, continue to appreciate recommendations.   -- Taken to the OR on 7/5 for right heel partial calcanectomy and debridement of wound.  Patient tolerated procedure well. Continue wound vac. Pt and family agreeable to SNF.   -- Continue daily dressing   -- MRSA negative   -- ID following, appreciate recommendations  -- S/p vancomycin (7/1-7/3)  -- s/p Zosyn (7/1-7/5). Pt initially wanting to transition to po Abx, but as pt is now amenable to SNF at [place] may consider contining IV Abx at [place] vs oral Abx. Will follow up ID for recs.    -- blood culture x 2 NG 5D  -- Heel culture grew Pseudomonas aeruginosa   --Bone and foot cx: probable ESBL producing Klebsiella, Strep agalactiae and Enterococcus avium   -- Started on meropenem per ID (7/8-  -- Leukocytosis improving.  White blood cell count is 7.2 as of 7/10  -- CRP 15.80 on 7/2  -- Of note, Per chart review, Patient underwent  began seeing Dr. Phelan in [place] for wound care and had been scheduled for a staged calcaneus amputation for 8/9/24.  Today she presents with a right heel decubitus foot ulcer without sensations of pain or tingling. She had a low-grade fever of 99.5F and notes \"darkening\" of the foot with a foul smell. She has some baseline confusion, but her daughter notes worsening confusion and agitation in the last few days. The pt had been on Cipro and Keflex PO outpatient, with the most recent dose 7/1 in the morning. Her daughter was present on 7/1 to assist with history.     Subjective/HPI:   Niya Blankenship feels well overall. She denies HA, SOB, CP, N/V/D. States that she is eating well and has no questions/concerns today. Pt was evaluated at bedside with family in room.       PMH, SURGICAL HX, FH, SOCIAL HX reviewed and updated as needed.    Medications:  Reviewed    Infusion Medications    sodium chloride      dextrose       Scheduled Medications    meropenem  1,000 mg IntraVENous Q12H    insulin glargine  15 Units SubCUTAneous Nightly    insulin lispro  10 Units SubCUTAneous TID WC    amLODIPine  2.5 mg Oral Daily    insulin lispro  0-8 Units SubCUTAneous TID WC    insulin lispro  0-4 Units SubCUTAneous Nightly    lisinopril  40 mg Oral Daily    potassium chloride  40 mEq Oral Once    sodium chloride flush  5-40 mL IntraVENous 2 times per day    enoxaparin  40 mg SubCUTAneous Daily    escitalopram  10 mg Oral Daily     PRN Meds: hydrOXYzine pamoate, hydrALAZINE, sodium chloride flush, sodium chloride, potassium chloride **OR** potassium alternative oral replacement **OR** potassium chloride, ondansetron **OR** ondansetron, polyethylene glycol, acetaminophen **OR** acetaminophen, zinc oxide, glucose, dextrose bolus **OR** dextrose bolus, glucagon (rDNA), dextrose      Intake/Output Summary (Last 24 hours) at 7/10/2024 1524  Last data filed at 7/10/2024 1326  Gross per 24 hour   Intake 1108.82 ml   Output --   Net 1108.82  Prophylaxis: [x] Lovenox / [] Heparin / [] SCDs / [] Already on Systemic Anticoagulation / [] None     PT/OT: [x]Yes / []No    Disposition:    [] Home       [] TCU       [] Rehab       [] Psych       [] SNF       [] Long Term Care Facility       [x] Other- home with HH vs SNF    Code Status: DNR-CCA      An electronic signature was used to authenticate this note  - Josefina Loya MD PGY-2 on 7/10/2024 at 3:24 PM

## 2024-07-10 NOTE — CARE COORDINATION
7/10/24, 10:39 AM EDT    DISCHARGE PLANNING EVALUATION    Spoke with patient and family.  They are requesting to go home with home health.  Daughter shares that they have managed a wound vac before at home, patient will also need IV antibiotics.   Discussed with care manager.     Spoke with [facility], they estimate cost of care at $1,100 per day.        Working toward plan for home

## 2024-07-10 NOTE — PROGRESS NOTES
Progress note: Infectious diseases    Patient - Niya Blankenship,  Age - 74 y.o.    - 1950      Room Number - 7K-12/012-A   MRN -  [id number]   [facility] # - [id number]  Date of Admission -  2024  1:19 PM    SUBJECTIVE:   No new issues  OBJECTIVE   VITALS    height is 1.626 m (5' 4\") and weight is 59 kg (130 lb). Her oral temperature is 98.2 °F (36.8 °C). Her blood pressure is 129/62 and her pulse is 85. Her respiration is 16 and oxygen saturation is 94%.       Wt Readings from Last 3 Encounters:   24 59 kg (130 lb)   21 70.3 kg (155 lb)       I/O (24 Hours)    Intake/Output Summary (Last 24 hours) at 7/10/2024 1255  Last data filed at 7/10/2024 0857  Gross per 24 hour   Intake 1308.82 ml   Output --   Net 1308.82 ml         General Appearance  Awake, alert, oriented,  not  In acute distress  HEENT - normocephalic, atraumatic, pale  conjunctiva,  anicteric sclera  Neck - Supple, no mass  Lungs -  Bilateral  air entry, no rhonchi, no wheeze  Cardiovascular - Heart sounds are normal.    Abdomen - soft, not distended, nontender,   Neurologic -oriented  Skin - No bruising or bleeding  Extremities -  wound vac to the right foot wound    MEDICATIONS:      meropenem  1,000 mg IntraVENous Q12H    insulin glargine  15 Units SubCUTAneous Nightly    insulin lispro  10 Units SubCUTAneous TID WC    amLODIPine  2.5 mg Oral Daily    insulin lispro  0-8 Units SubCUTAneous TID WC    insulin lispro  0-4 Units SubCUTAneous Nightly    lisinopril  40 mg Oral Daily    potassium chloride  40 mEq Oral Once    sodium chloride flush  5-40 mL IntraVENous 2 times per day    enoxaparin  40 mg SubCUTAneous Daily    escitalopram  10 mg Oral Daily      sodium chloride      dextrose       hydrOXYzine pamoate, hydrALAZINE, sodium chloride flush, sodium chloride, potassium chloride **OR** potassium alternative oral replacement **OR** potassium

## 2024-07-10 NOTE — PLAN OF CARE
Problem: Discharge Planning  Goal: Discharge to home or other facility with appropriate resources  Outcome: Progressing  Flowsheets (Taken 7/10/2024 1805)  Discharge to home or other facility with appropriate resources:   Identify barriers to discharge with patient and caregiver   Arrange for needed discharge resources and transportation as appropriate   Identify discharge learning needs (meds, wound care, etc)     Problem: Safety - Adult  Goal: Free from fall injury  Outcome: Progressing  Flowsheets (Taken 7/10/2024 1805)  Free From Fall Injury: Instruct family/caregiver on patient safety     Problem: Skin/Tissue Integrity  Goal: Absence of new skin breakdown  Description: 1.  Monitor for areas of redness and/or skin breakdown  2.  Assess vascular access sites hourly  3.  Every 4-6 hours minimum:  Change oxygen saturation probe site  4.  Every 4-6 hours:  If on nasal continuous positive airway pressure, respiratory therapy assess nares and determine need for appliance change or resting period.  Outcome: Progressing  Note: No new skin breakdown noted at this time this shift.     Problem: Pain  Goal: Verbalizes/displays adequate comfort level or baseline comfort level  Outcome: Progressing  Flowsheets (Taken 7/10/2024 1805)  Verbalizes/displays adequate comfort level or baseline comfort level:   Encourage patient to monitor pain and request assistance   Assess pain using appropriate pain scale   Administer analgesics based on type and severity of pain and evaluate response   Implement non-pharmacological measures as appropriate and evaluate response     Problem: Skin/Tissue Integrity - Adult  Goal: Skin integrity remains intact  Outcome: Progressing  Flowsheets (Taken 7/10/2024 1805)  Skin Integrity Remains Intact: Monitor for areas of redness and/or skin breakdown     Problem: Skin/Tissue Integrity - Adult  Goal: Incisions, wounds, or drain sites healing without S/S of infection  Outcome: Progressing  Flowsheets

## 2024-07-10 NOTE — PROGRESS NOTES
Podiatric Progress Note  Niya Blankenship  Subjective :   7/10/24  Patient was seen bedside today on behalf of Dr. Phelan. Patient was alert and oriented to person, place, time, and event. Resident with the primary team was present in room. Patient is 5 days s/p partial calcanectomy and debridement with placement of wound VAC. Patient had family at bedside. Patient denies any pain to her foot. Relates that she feels great with no constitutional symptoms. Patient states she did not eat dinner last night because she does not like this food. No other pedal complaints at this time.    7/9   Patient seen bedside today alongside Dr. Phelan patient was alert oriented to person place time and event. Patient is status postop 4 days partial calcanectomy and debridement with placement of wound VAC.  Patient had family at bedside. Patient denies any pain to her foot patient said she feels great with no constitutional symptoms. Patient expressed her desire to be placed in a skilled nursing facility of her choice.  No other pedal complaints at this time.    7/8   Patient seen bedside today on behalf of Dr. Phelan.  Patient alert and oriented to person, place, time and event.  Patient is s/p 3 days partial calcanectomy and debridement.  Patient has family present at bedside.  She denies any pain to her foot this morning or issues with back over the weekend.  Patient denies any constitutional symptoms.  No other pedal concerns.    7/6  Patient seen bedside today on behalf of Dr Phelan. Patient is alert and oriented to person, place, time and event. 1 day s/p partial calcanectomy and debridement. Patient's family is present at bedside. Denies pain to her foot. Wound VAC has been working well overnight. Denies any constitutional symptoms. No other pedal concerns.    7/4/24  Progress patient was seen bedside today on behalf of Dr. Phelan.  Patient is alert and oriented to person, place, time and event.  Patient's family is present at bedside.   PRN  dextrose bolus 10% 125 mL, 125 mL, IntraVENous, PRN **OR** dextrose bolus 10% 250 mL, 250 mL, IntraVENous, PRN  glucagon (rDNA) injection 1 mg, 1 mg, SubCUTAneous, PRN  dextrose 10 % infusion, , IntraVENous, Continuous PRN    Objective     BP (!) 150/80   Pulse 88   Temp 98.6 °F (37 °C) (Oral)   Resp 16   Ht 1.626 m (5' 4\")   Wt 59 kg (130 lb)   SpO2 97%   BMI 22.31 kg/m²      I/O:  Intake/Output Summary (Last 24 hours) at 7/10/2024 0902  Last data filed at 7/10/2024 0857  Gross per 24 hour   Intake 1658.82 ml   Output --   Net 1658.82 ml                Wt Readings from Last 3 Encounters:   07/01/24 59 kg (130 lb)   04/01/21 70.3 kg (155 lb)       LABS:    Recent Labs     07/09/24  0604 07/10/24  0551   WBC 7.0 7.2   HGB 8.3* 8.7*   HCT 27.9* 28.3*   * 497*          Recent Labs     07/10/24  0551      K 3.7   CL 99   CO2 28   BUN 16   CREATININE 0.8        No results for input(s): \"INR\", \"APTT\" in the last 72 hours.    Invalid input(s): \"PROT\"   No results for input(s): \"CKTOTAL\", \"CKMB\", \"CKMBINDEX\", \"TROPONINI\" in the last 72 hours.      Exam:     Vascular: From 7/4: Dorsalis pedis and posterior tibial pulses are dopplerable bilaterally. Skin temperature is warm to warm from proximal tibial tuberosity to distal digits. CFT brisk to exposed digits. Edema absent. Hair growth absent.     Dermatologic: 5 days s/p partial calcanectomy right heel. Wound measures approximate 12 x 7 cm.  Centrally there is exposed calcaneus at the posterior aspect of the incision.  Circumferentially there is beefy healthy granular tissue extending into the plantar forefoot.  At this plantar forefoot region there is necrotic like tissue in nature with macerated elements to it.  All other skin edges are viable.  There is no drainage or malodor appreciated.       Neurovascular: Light touch and gross sensation diminished     Musculoskeletal: Muscle strength and ROM testing deferred. Pain with palpation of right heel.  Foot and ankle in grossly rectus alignment.        ASSESSMENT  Principle  1.  Chronic right heel ulcer, bone exposure  2.  Diabetes with peripheral neuropathy    Chronic  Patient Active Problem List   Diagnosis    Diabetic ulcer of right heel associated with type 2 diabetes mellitus (HCC)    Non-healing open wound of heel    Osteomyelitis of foot, right, acute ([facility])    Hypomagnesemia    Depression    Anxiety disorder    Sinus arrhythmia    Acute blood loss anemia    Chronic iron deficiency anemia    Type 2 diabetes mellitus treated without insulin ([facility])    Metabolic encephalopathy    Essential hypertension    Thrombocytosis    Hypokalemia       PLAN:   - Pt. is a 74 y.o. female   - Patient was examined and evaluated  - 5 day s/p R partial calcanectomy and debridement  - WBC 7.2, afebrile  - Glucose 157  - Culture anaerobic and aerobic soft tissue right foot: final result - klebsiella pneumoniae, streptococcus agalactiae - group B, enterococcus avium - group D  - Arterial Dopplers ordered, reviewed: \"Excellent pulsatility seen throughout both legs...there is no evidence to suggest significant arterial stenosis.\"  - X-rays and MRI reviewed, noted involvement of the plantar aspect of the calcaneus with lytic destruction.  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 Question involvement of the talus as well.  - Continue IV Antibiotics per ID  - Wound VAC was functioning properly with good seal noted and no leaks appreciated.   - Wound vac was taken down today and saline wet-to-dry was applied. Wound ostomy to reapply wound vac today  - Weight bearing status: Nonweightbearing RLE.  - Discussed importance of nutritional status, ordered high protein oral supplement  - Patient is agreeable to SNF placement, precert pending.  - Consult placed to wound ostomy for reapplication of wound VAC to right heel with status post partial calcanectomy  -

## 2024-07-10 NOTE — PROGRESS NOTES
[facility]  STR ORTHOPEDICS 7K  Occupational Therapy  Daily Note  Time:   Time In: 1048  Time Out: 1126  Timed Code Treatment Minutes: 38 Minutes  Minutes: 38          Date: 7/10/2024  Patient Name: Niya Blankenship,   Gender: female      Room: [facility]12/012-A  MRN: [id number]  : 1950  (74 y.o.)  Referring Practitioner: Josefina Loya MD  Diagnosis: Diabetic ulcer of right heel associated with type 2 diabetes mellitus  Additional Pertinent Hx: Per EMR: 74 y.o. female who presented to [facility] with ?Pmhx of non-insulin dependent DM2, diabetic peripheral neuropathy and HTN who presented to the ED with gradually worsening R heel ulcer.  Her symptoms began in 2025 which were managed outpatient with various at home remedies including foot baths and home remedy oil with minimal improvement.  Her ulcer gradually grew from a small spot to overtaking her heel.  During 2024, she was hospitalized and underwent a R heel debridement. She began seeing Dr. Phelan in [place] for wound care and had been scheduled for a staged calcaneus amputation for 24.  Today she presents with a right heel decubitus foot ulcer without sensations of pain or tingling. Pt s/p Excisional debridement of wound, right heel, 12 x 7 cm  -Partial calcanectomy, right  -Application of wound VAC, right foot on 24    Restrictions/Precautions:  Restrictions/Precautions: Weight Bearing, Fall Risk  Right Lower Extremity Weight Bearing: Non Weight Bearing  Position Activity Restriction  Other position/activity restrictions: wound vac R heel     Social/Functional History:  Lives With: Daughter (and her family; pt lives in [place] so has multiple family members able to assist)  Type of Home: House  Home Layout: One level  Home Access: Ramped entrance  Home Equipment: Rollator, Wheelchair - Manual   Bathroom Equipment: Commode       ADL Assistance: Independent  Homemaking Assistance: Needs assistance  (family completes)  Ambulation Assistance: Non-ambulatory (has been nonambulatory since 01/24 due to heel wound; uses w/c for mobility)  Transfer Assistance: Independent    Active : No  Patient's  Info: family via horse and buggy or hire a  for long distance          SUBJECTIVE: Pt. In room and agreeable to participate.  Pt.'s daughter present for treatment.  Pt. Repetitively stating \"I'm going home today\" encouragement provided to continue treatment Pt. Focus on returning home with daughter.      PAIN: Denies    Vitals: Nurse checked vitals prior to session    COGNITION: Decreased Recall, Impaired Memory, Decreased Problem Solving, Decreased Safety Awareness, and Impulsive    ADL:   ADLs already completed prior to treatment.  Daughter present and reports the family will assist Pt. With all daily self care activities.  .    IADL:   Not Tested    BALANCE:  Sitting Balance:  Stand By Assistance.    Standing Balance: Contact Guard Assistance, with verbal cues . Pt. Stood x 2 trials x 1.5 mins each with unilateral reach with support of the RW.  Pt. Unable to demo simulation of clothing management safely in order to maintain NWB on RLE and upright posture.     BED MOBILITY:  Sit to Supine: Minimal Assistance, X 1, with rail, with verbal cues , with increased time for completion    Scooting: Dependent hercules utilized    TRANSFERS:  Sit to Stand:  Contact Guard Assistance. Vcs for hand placement to stand from recliner  Stand to Sit: Contact Guard Assistance.    Stand Pivot: Contact Guard Assistance, X 1, cues for hand placement. Demo good adherence to NWB on RLE    FUNCTIONAL MOBILITY:  OTR to assess    ADDITIONAL ACTIVITIES:  Patient completed BUE strengthening exercises with skilled education on HEP: completed x12 reps x1 set with ~1# object in all joints and all planes in order to improve UE strength and activity tolerance required for BADL routine and toilet / shower transfers. Patient tolerated ,  requiring  rest breaks. Patient also required visual and verbal cues for technique.       Educated on precautions and possible adaptations for home and reviewed discharge plans with daughter and Pt. The daughter and Pt. now report Pt. Is returning home with family assistance from the Pt.s children and home health services  Pt. And the daughter report no DME needs at this time.     Modified Leti Scale:  Not Applicable    ASSESSMENT:     Activity Tolerance:  Patient tolerance of  treatment: Good treatment tolerance      Discharge Recommendations: 24 hour assistance or supervision, Home with assistance of aide, and Home with Home Health OT  Equipment Recommendations: Equipment Needed: No  Plan: Times Per Week: 4-5x  Current Treatment Recommendations: Strengthening, ROM, Balance training, Endurance training, Patient/Caregiver education & training, Equipment evaluation, education, & procurement, Safety education & training, Self-Care / ADL, Cognitive reorientation    Education:  Learners: Patient and Family  ADL's, Home Exercise Program, Precautions, Family Education, Fall Prevention, and Assistive Device Safety    Goals  Short Term Goals  Time Frame for Short Term Goals: by discharge  Short Term Goal 1: Pt will complete sit<>stand and stand pivot transfers with SBA and 0 cues for NWB in prep for BSC transfers.  Short Term Goal 2: Pt will complete LB ADLs with SBA to increase independence with donning socks/shoes  Short Term Goal 3: Pt will tolerate dynamic standing X2 minutes with SBA and unilateral release in prep for toileting/clothing mgmt tasks.  Long Term Goals  Time Frame for Long Term Goals : not set due to ELOS    Following session, patient left in safe position with all fall risk precautions in place.

## 2024-07-10 NOTE — PROGRESS NOTES
[facility]ound Ostomy Continence Nurse  Progress Note       NAME:  Niya Blankenship  MEDICAL RECORD NUMBER:  [id number]  AGE: 74 y.o.   GENDER: female  : 1950  TODAY'S DATE:  7/10/2024    Subjective   Reason for WOCN Evaluation and Assessment: wound vac to right heel  Please apply wound vac to right heel s/p right heel partial calcenectomy . Please remove and reapply adaptic to heel bone     Please change and reapply MWF      Niya Blankenship is a 74 y.o. female referred by:   [x] Physician/ PA/ APRN-CNP  [] Nursing  [] Other:     Wound Identification:  Wound Type: diabetic and neuropathic  ulcer  Wound Location: right heel  Modifying factors:  infection     Patient Goal of Care:  [x] Wound Healing  [x] Drainage Management   [] Odor Control  [] Palliative Care  [x] Pain Control         Objective     Tim Risk Score: Tim Scale Score: 19    Assessment     Encounter: Wound ostomy present to room for reapplication of wound vac therapy to right heel ulcer per order from Dr Phelan. Patient in bed upon arrival. Dr Cade at bedside.  Removed old  dressing. Wound photo and assessment to follow. Cleansed wound with normal saline and gauze. Applied skin prep to rohan wound. Applied stoma wafer to rohan wound to protect good skin. Applied cuticerin to protect exposed bone. Applied cut-to-fit black foam x 1 piece to wound bed followed by clear drape. Cut quarter size hole in center of drape over sponge area and apply vac suction. Wound vac settings at 125 mmHg continuous suction. Right ankle wrapped with kerlix and ACE, secured with tape. Staff to apply extra clear drape as needed if leaks noted and to change canister as needed when full. Wound ostomy to change wound vac three times weekly. Will continue to follow patient. Bed in low, call light in reach.    Photo:     24    Wound type: Right heel DM ulcer  Wound size: 11cm x 6.5cm x 1.1cm  Undermining or Tunneling: None  Wound assessment/color: Slough/ necrotic,  understanding able to:   [] Indicates understanding       [x] Needs reinforcement  [] No evidence of learning  [] Refused teaching

## 2024-07-10 NOTE — PROGRESS NOTES
[facility]  INPATIENT PHYSICAL THERAPY  DAILY NOTE  [facility] ORTHOPEDICS 7K - 7K-12/012-A    Time In: 0756  Time Out: 0819  Timed Code Treatment Minutes: 23 Minutes  Minutes: 23          Date: 7/10/2024  Patient Name: Niya Blankenship,  Gender:  female        MRN: [id number]  : 1950  (74 y.o.)     Referring Practitioner: Josefina Loya MD  Diagnosis: Diabetic ulcer of right heel associated with type 2 diabetes mellitus        Prior Level of Function:  Lives With: Daughter (and her family; pt lives in [facility] so has multiple family members able to assist)  Type of Home: House  Home Layout: One level  Home Access: Ramped entrance  Home Equipment: Rollator, Wheelchair - Manual   Bathroom Equipment: Commode    ADL Assistance: Independent  Homemaking Assistance: Needs assistance (family completes)  Ambulation Assistance: Non-ambulatory (has been nonambulatory since  due to heel wound; uses w/c for mobility)  Transfer Assistance: Independent  Active : No    Restrictions/Precautions:  Restrictions/Precautions: Weight Bearing, Fall Risk  Right Lower Extremity Weight Bearing: Non Weight Bearing  Position Activity Restriction  Other position/activity restrictions: wound vac R heel     SUBJECTIVE: RN ok'ed session. Patient resting in bed with family member present during session. Patient often voicing fatigue and tiredness throughout with encouragement provided to sit in chair for meals.     PAIN: denies pain     Vitals: Vitals not assessed per clinical judgement, see nursing flowsheet    OBJECTIVE:  Bed Mobility:  Supine to Sit: Minimal Assistance, X 1, with head of bed raised, without rail, with verbal cues , with increased time for completion    Transfers:  Sit to Stand: Contact Guard Assistance, Minimal Assistance, X 1  Stand to Sit:Contact Guard Assistance, X 1  Stand Pivot: Contact guard/minimal assistance with good carryover of R LE NWB.   Ambulation:  Not tested attempt next session

## 2024-07-10 NOTE — CARE COORDINATION
I called [facility] pharmacy.  They would be able to provide ATB for patient.  They need the script and facesheet faxed to [phone].  I told her the ID already made rounds and we would try to get a script tomorrow.   [facility] pharmacy number is [phone] option 4.    I asked [facility] which  agency do they work with in [place].  They said  [facility] out of [place], [place].  I called them and they do not take self-pay patients.  They only take Medicare.

## 2024-07-11 LAB
ANION GAP SERPL CALC-SCNC: 12 MEQ/L (ref 8–16)
BASOPHILS ABSOLUTE: 0.1 THOU/MM3 (ref 0–0.1)
BASOPHILS NFR BLD AUTO: 0.9 %
BUN SERPL-MCNC: 18 MG/DL (ref 7–22)
CALCIUM SERPL-MCNC: 8.7 MG/DL (ref 8.5–10.5)
CHLORIDE SERPL-SCNC: 100 MEQ/L (ref 98–111)
CO2 SERPL-SCNC: 25 MEQ/L (ref 23–33)
CREAT SERPL-MCNC: 0.8 MG/DL (ref 0.4–1.2)
DEPRECATED RDW RBC AUTO: 47 FL (ref 35–45)
EOSINOPHIL NFR BLD AUTO: 7.4 %
EOSINOPHILS ABSOLUTE: 0.5 THOU/MM3 (ref 0–0.4)
ERYTHROCYTE [DISTWIDTH] IN BLOOD BY AUTOMATED COUNT: 15.5 % (ref 11.5–14.5)
GFR SERPL CREATININE-BSD FRML MDRD: 77 ML/MIN/1.73M2
GLUCOSE BLD STRIP.AUTO-MCNC: 115 MG/DL (ref 70–108)
GLUCOSE BLD STRIP.AUTO-MCNC: 259 MG/DL (ref 70–108)
GLUCOSE BLD STRIP.AUTO-MCNC: 325 MG/DL (ref 70–108)
GLUCOSE SERPL-MCNC: 202 MG/DL (ref 70–108)
HCT VFR BLD AUTO: 28 % (ref 37–47)
HGB BLD-MCNC: 8.5 GM/DL (ref 12–16)
IMM GRANULOCYTES # BLD AUTO: 0.06 THOU/MM3 (ref 0–0.07)
IMM GRANULOCYTES NFR BLD AUTO: 0.9 %
LYMPHOCYTES ABSOLUTE: 1.7 THOU/MM3 (ref 1–4.8)
LYMPHOCYTES NFR BLD AUTO: 25.4 %
MCH RBC QN AUTO: 25.4 PG (ref 26–33)
MCHC RBC AUTO-ENTMCNC: 30.4 GM/DL (ref 32.2–35.5)
MCV RBC AUTO: 83.6 FL (ref 81–99)
MONOCYTES ABSOLUTE: 0.9 THOU/MM3 (ref 0.4–1.3)
MONOCYTES NFR BLD AUTO: 12.8 %
NEUTROPHILS ABSOLUTE: 3.5 THOU/MM3 (ref 1.8–7.7)
NEUTROPHILS NFR BLD AUTO: 52.6 %
NRBC BLD AUTO-RTO: 0 /100 WBC
PLATELET # BLD AUTO: 462 THOU/MM3 (ref 130–400)
PMV BLD AUTO: 10.2 FL (ref 9.4–12.4)
POTASSIUM SERPL-SCNC: 4.1 MEQ/L (ref 3.5–5.2)
RBC # BLD AUTO: 3.35 MILL/MM3 (ref 4.2–5.4)
SODIUM SERPL-SCNC: 137 MEQ/L (ref 135–145)
WBC # BLD AUTO: 6.7 THOU/MM3 (ref 4.8–10.8)

## 2024-07-11 PROCEDURE — 6360000002 HC RX W HCPCS: Performed by: INTERNAL MEDICINE

## 2024-07-11 PROCEDURE — 6370000000 HC RX 637 (ALT 250 FOR IP)

## 2024-07-11 PROCEDURE — 6360000002 HC RX W HCPCS

## 2024-07-11 PROCEDURE — 99233 SBSQ HOSP IP/OBS HIGH 50: CPT | Performed by: INTERNAL MEDICINE

## 2024-07-11 PROCEDURE — 85025 COMPLETE CBC W/AUTO DIFF WBC: CPT

## 2024-07-11 PROCEDURE — 6360000002 HC RX W HCPCS: Performed by: PHYSICIAN ASSISTANT

## 2024-07-11 PROCEDURE — 36415 COLL VENOUS BLD VENIPUNCTURE: CPT

## 2024-07-11 PROCEDURE — 2580000003 HC RX 258: Performed by: INTERNAL MEDICINE

## 2024-07-11 PROCEDURE — 82948 REAGENT STRIP/BLOOD GLUCOSE: CPT

## 2024-07-11 PROCEDURE — 1200000000 HC SEMI PRIVATE

## 2024-07-11 PROCEDURE — 80048 BASIC METABOLIC PNL TOTAL CA: CPT

## 2024-07-11 PROCEDURE — 2580000003 HC RX 258

## 2024-07-11 RX ORDER — KETOROLAC TROMETHAMINE 30 MG/ML
15 INJECTION, SOLUTION INTRAMUSCULAR; INTRAVENOUS ONCE
Status: COMPLETED | OUTPATIENT
Start: 2024-07-11 | End: 2024-07-11

## 2024-07-11 RX ORDER — BENZOCAINE/MENTHOL 6 MG-10 MG
LOZENGE MUCOUS MEMBRANE 2 TIMES DAILY PRN
Status: DISCONTINUED | OUTPATIENT
Start: 2024-07-11 | End: 2024-07-17 | Stop reason: HOSPADM

## 2024-07-11 RX ADMIN — ACETAMINOPHEN 650 MG: 325 TABLET ORAL at 15:27

## 2024-07-11 RX ADMIN — LISINOPRIL 40 MG: 40 TABLET ORAL at 09:41

## 2024-07-11 RX ADMIN — ACETAMINOPHEN 650 MG: 325 TABLET ORAL at 09:41

## 2024-07-11 RX ADMIN — SODIUM CHLORIDE, PRESERVATIVE FREE 10 ML: 5 INJECTION INTRAVENOUS at 12:31

## 2024-07-11 RX ADMIN — INSULIN LISPRO 4 UNITS: 100 INJECTION, SOLUTION INTRAVENOUS; SUBCUTANEOUS at 21:09

## 2024-07-11 RX ADMIN — HYDROMORPHONE HYDROCHLORIDE 0.5 MG: 1 INJECTION, SOLUTION INTRAMUSCULAR; INTRAVENOUS; SUBCUTANEOUS at 21:08

## 2024-07-11 RX ADMIN — INSULIN LISPRO 10 UNITS: 100 INJECTION, SOLUTION INTRAVENOUS; SUBCUTANEOUS at 09:42

## 2024-07-11 RX ADMIN — INSULIN LISPRO 10 UNITS: 100 INJECTION, SOLUTION INTRAVENOUS; SUBCUTANEOUS at 12:27

## 2024-07-11 RX ADMIN — KETOROLAC TROMETHAMINE 15 MG: 30 INJECTION, SOLUTION INTRAMUSCULAR at 17:21

## 2024-07-11 RX ADMIN — MEROPENEM 1000 MG: 1 INJECTION INTRAVENOUS at 00:23

## 2024-07-11 RX ADMIN — INSULIN GLARGINE 15 UNITS: 100 INJECTION, SOLUTION SUBCUTANEOUS at 21:08

## 2024-07-11 RX ADMIN — SODIUM CHLORIDE, PRESERVATIVE FREE 10 ML: 5 INJECTION INTRAVENOUS at 21:09

## 2024-07-11 RX ADMIN — HYDROCORTISONE: 1 CREAM TOPICAL at 18:04

## 2024-07-11 RX ADMIN — INSULIN LISPRO 4 UNITS: 100 INJECTION, SOLUTION INTRAVENOUS; SUBCUTANEOUS at 12:27

## 2024-07-11 RX ADMIN — INSULIN LISPRO 2 UNITS: 100 INJECTION, SOLUTION INTRAVENOUS; SUBCUTANEOUS at 09:43

## 2024-07-11 RX ADMIN — ENOXAPARIN SODIUM 40 MG: 100 INJECTION SUBCUTANEOUS at 09:41

## 2024-07-11 RX ADMIN — ESCITALOPRAM OXALATE 10 MG: 10 TABLET, FILM COATED ORAL at 09:41

## 2024-07-11 RX ADMIN — HYDROXYZINE PAMOATE 25 MG: 25 CAPSULE ORAL at 09:46

## 2024-07-11 RX ADMIN — ACETAMINOPHEN 650 MG: 325 TABLET ORAL at 03:29

## 2024-07-11 RX ADMIN — AMLODIPINE BESYLATE 2.5 MG: 2.5 TABLET ORAL at 09:41

## 2024-07-11 RX ADMIN — MEROPENEM 1000 MG: 1 INJECTION INTRAVENOUS at 12:30

## 2024-07-11 RX ADMIN — MEROPENEM 1000 MG: 1 INJECTION INTRAVENOUS at 21:12

## 2024-07-11 ASSESSMENT — PAIN DESCRIPTION - ORIENTATION
ORIENTATION: LEFT
ORIENTATION: RIGHT
ORIENTATION: RIGHT

## 2024-07-11 ASSESSMENT — PAIN DESCRIPTION - LOCATION
LOCATION: FOOT
LOCATION: LEG
LOCATION: FOOT

## 2024-07-11 ASSESSMENT — PAIN SCALES - GENERAL
PAINLEVEL_OUTOF10: 8
PAINLEVEL_OUTOF10: 8
PAINLEVEL_OUTOF10: 7
PAINLEVEL_OUTOF10: 5

## 2024-07-11 ASSESSMENT — PAIN DESCRIPTION - DESCRIPTORS
DESCRIPTORS: ACHING;DISCOMFORT;THROBBING
DESCRIPTORS: ACHING
DESCRIPTORS: ACHING
DESCRIPTORS: ACHING;THROBBING

## 2024-07-11 ASSESSMENT — PAIN - FUNCTIONAL ASSESSMENT
PAIN_FUNCTIONAL_ASSESSMENT: PREVENTS OR INTERFERES SOME ACTIVE ACTIVITIES AND ADLS
PAIN_FUNCTIONAL_ASSESSMENT: PREVENTS OR INTERFERES SOME ACTIVE ACTIVITIES AND ADLS

## 2024-07-11 ASSESSMENT — PAIN SCALES - WONG BAKER: WONGBAKER_NUMERICALRESPONSE: NO HURT

## 2024-07-11 NOTE — PROGRESS NOTES
Progress note: Infectious diseases    Patient - Niya Blankenship,  Age - 74 y.o.    - 1950      Room Number - 7K-12/012-A   MRN -  [id number]   [facility] # - [id number]  Date of Admission -  2024  1:19 PM    SUBJECTIVE:   No new issues. Now she wants to go to [facility]  OBJECTIVE   VITALS    height is 1.626 m (5' 4\") and weight is 59 kg (130 lb). Her temperature is 97.9 °F (36.6 °C). Her blood pressure is 149/70 (abnormal) and her pulse is 88. Her respiration is 18 and oxygen saturation is 100%.       Wt Readings from Last 3 Encounters:   24 59 kg (130 lb)   21 70.3 kg (155 lb)       I/O (24 Hours)    Intake/Output Summary (Last 24 hours) at 2024 1257  Last data filed at 7/10/2024 2019  Gross per 24 hour   Intake 320 ml   Output 3 ml   Net 317 ml         General Appearance  Awake, alert, oriented,  not  In acute distress  HEENT - normocephalic, atraumatic, pale  conjunctiva,  anicteric sclera  Neck - Supple, no mass  Lungs -  Bilateral  air entry, no rhonchi, no wheeze  Cardiovascular - Heart sounds are normal.    Abdomen - soft, not distended, nontender,   Neurologic -oriented  Skin - No bruising or bleeding  Extremities -  wound vac to the right foot wound    MEDICATIONS:      meropenem  1,000 mg IntraVENous Q12H    insulin glargine  15 Units SubCUTAneous Nightly    insulin lispro  10 Units SubCUTAneous TID WC    amLODIPine  2.5 mg Oral Daily    insulin lispro  0-8 Units SubCUTAneous TID WC    insulin lispro  0-4 Units SubCUTAneous Nightly    lisinopril  40 mg Oral Daily    potassium chloride  40 mEq Oral Once    sodium chloride flush  5-40 mL IntraVENous 2 times per day    enoxaparin  40 mg SubCUTAneous Daily    escitalopram  10 mg Oral Daily      sodium chloride      dextrose       hydrOXYzine pamoate, hydrALAZINE, sodium chloride flush, sodium chloride, potassium chloride **OR** potassium alternative oral  replacement **OR** potassium chloride, ondansetron **OR** ondansetron, polyethylene glycol, acetaminophen **OR** acetaminophen, zinc oxide, glucose, dextrose bolus **OR** dextrose bolus, glucagon (rDNA), dextrose      LABS:     CBC:   Recent Labs     07/09/24  0604 07/10/24  0551 07/11/24  0615   WBC 7.0 7.2 6.7   HGB 8.3* 8.7* 8.5*   * 497* 462*       BMP:    Recent Labs     07/09/24  0604 07/10/24  0551 07/11/24  0615   * 136 137   K 3.8 3.7 4.1   CL 94* 99 100   CO2 24 28 25   BUN 15 16 18   CREATININE 0.9 0.8 0.8   GLUCOSE 236* 157* 202*       Calcium:  Recent Labs     07/11/24  0615   CALCIUM 8.7        Recent Labs     07/10/24  1534 07/10/24  2005 07/11/24  1053   POCGLU 326* 302* 259*          CULTURES:   UA: No results for input(s): \"SPECGRAV\", \"PHUR\", \"COLORU\", \"CLARITYU\", \"MUCUS\", \"PROTEINU\", \"BLOODU\", \"RBCUA\", \"WBCUA\", \"BACTERIA\", \"NITRU\", \"GLUCOSEU\", \"BILIRUBINUR\", \"UROBILINOGEN\", \"KETUA\", \"LABCAST\", \"LABCASTTY\", \"AMORPHOS\" in the last 72 hours.    Invalid input(s): \"CRYSTALS\"  Micro:   Lab Results   Component Value Date/Time    BC  07/01/2024 02:34 PM     No growth 24 hours. No growth 48 hours. No growth at 5 days        Problem list of patient:     Patient Active Problem List   Diagnosis Code    Diabetic ulcer of right heel associated with type 2 diabetes mellitus ([facility]) E11.621, L97.419    Non-healing open wound of heel S91.309A    Osteomyelitis of foot, right, acute ([facility]) M86.171    Hypomagnesemia E83.42    Depression F32.A    Anxiety disorder F41.9    Sinus arrhythmia I49.8    Acute blood loss anemia D62    Chronic iron deficiency anemia D50.9    Type 2 diabetes mellitus treated without insulin ([facility]) E11.9    Metabolic encephalopathy G93.41    Essential hypertension I10    Thrombocytosis D75.839    Hypokalemia E87.6         ASSESSMENT/PLAN   Right heel osteomyelitis of the calcaneus with skin necrosis and gas formation: she had debridement of th wound, on wound vac.  Diabetes mellitus  with neuropathy  Continue iv meropenem  Awaiting placement to a facility    Ministerio Cade MD, 7/11/2024 12:57 PM

## 2024-07-11 NOTE — PLAN OF CARE
Problem: Discharge Planning  Goal: Discharge to home or other facility with appropriate resources  7/11/2024 0126 by Shira Loaiza, RN  Outcome: Progressing     Problem: Safety - Adult  Goal: Free from fall injury  7/11/2024 0126 by Shira Loaiza RN  Outcome: Progressing     Problem: Skin/Tissue Integrity  Goal: Absence of new skin breakdown  Description: 1.  Monitor for areas of redness and/or skin breakdown  2.  Assess vascular access sites hourly  3.  Every 4-6 hours minimum:  Change oxygen saturation probe site  4.  Every 4-6 hours:  If on nasal continuous positive airway pressure, respiratory therapy assess nares and determine need for appliance change or resting period.  7/11/2024 0126 by Shira Loaiza RN  Outcome: Progressing  7/10/2024 1805 by Kesha Brannon LPN  Outcome: Progressing  Note: No new skin breakdown noted at this time this shift.     Problem: Pain  Goal: Verbalizes/displays adequate comfort level or baseline comfort level  7/11/2024 0126 by Shira Loaiza RN  Outcome: Progressing  Flowsheets (Taken 7/10/2024 1930)  Verbalizes/displays adequate comfort level or baseline comfort level: Encourage patient to monitor pain and request assistance     Problem: Skin/Tissue Integrity - Adult  Goal: Skin integrity remains intact  7/11/2024 0126 by Shira Loaiza RN  Outcome: Progressing     Problem: Skin/Tissue Integrity - Adult  Goal: Incisions, wounds, or drain sites healing without S/S of infection  7/11/2024 0126 by Shira Loaiza, RN  Outcome: Progressing

## 2024-07-11 NOTE — PROGRESS NOTES
Comprehensive Nutrition Assessment    Type and Reason for Visit:  Reassess (Wounds; ONS)    Nutrition Recommendations/Plan:   Continue diet per provider and encourage food from home if better tolerated  Continue ONS: Cody (BID)  Recommend MVI to assist with wound healing efforts     Malnutrition Assessment:  Malnutrition Status:  At risk for malnutrition (Comment) (07/05/24 1569)    Context:  Acute Illness     Findings of the 6 clinical characteristics of malnutrition:  Energy Intake:  Mild decrease in energy intake (Comment) (with NPO status; variable PO intakes this admit)  Weight Loss:   (unable to accurately assess r/t stated weight this admission)     Body Fat Loss:  No significant body fat loss     Muscle Mass Loss:  No significant muscle mass loss    Fluid Accumulation:  No significant fluid accumulation     Strength:  Not Performed    Nutrition Assessment:     Pt. nutritionally compromised AEB wounds.  At risk for further nutrition compromise r/t admit with OM of R foot with leukocytosis, acute on chronic metabolic encephalopathy, increased nutrient needs for wound healing, underlying medical condition (PMHx: T2DM, HTN, depression/anxiety).      Nutrition Related Findings:    Pt. Report/Treatments/Miscellaneous: Pt seen, with good appetite and daughter states she had a large breakfast. Daughter was unsure of what Cody packets were - helped mix up to allow or patient to try. Will continue to encourage adequate PO intake at best efforts and family bringing food in if needed.   GI Status: BM - 7/11  Pertinent Labs: Glucose 202  Pertinent Meds: lexapro, lantus, humalog, merrem     Wound Type:  (R foot diabetic wound - I&D 7/5)       Current Nutrition Intake & Therapies:    Average Meal Intake:  (variable intakes)  Average Supplements Intake:  (mixed up for pt to try during visit)  ADULT ORAL NUTRITION SUPPLEMENT; Dinner; Wound Healing Oral Supplement  ADULT DIET; Regular; 5 carb choices (75

## 2024-07-11 NOTE — CARE COORDINATION
7/11/24, 10:51 AM EDT    DISCHARGE PLANNING EVALUATION    Spoke with family.  They now want patient to go to the snf due to family concerns about providing care in the home with all of patient's needs.        Discussed need for payment arrangements and snf needing a confirmation that they would have a plan for payment for care.   Cost per day is approximately $1,100.  Family plans to speak with their [facility] deacon about this and is considering going to [facility] to speak with them directly.      Spoke with [facility] admissions, Handy, and , Kelley.  They share that they may not have a bed available or staff to accommodate all of patient's needs due to changes in their census since patient had requested to go home.   SNF will have a determination later today if they can even consider again for admission.       Family will need to speak with [facility] staff directly to assure payment plan, if facility agrees to accept.  Family is expected to contact snf staff themselves as this is considered a private pay arrangements by the snf, and family members are aware of this expectation

## 2024-07-11 NOTE — PROGRESS NOTES
PROGRESS NOTE      Patient:  Niya Blankenship  Unit/Bed:7K-12/012-A  YOB: 1950  MRN: [id number]   Acct: [id number]    PCP: Taylor Maza APRN - NP    Date of Admission: 7/1/2024 LOS: 10    Date of Evaluation:  7/11/2024    Anticipated Discharge: pending placement     Assessment/Plan:    Osteomyelitis of right foot with leukocytosis  S/P excisional debridement of right heel and partial right calcanectomy on 7/5  -- MRI right foot 7/2: Soft tissue defect in the plantar soft tissues over the calcaneus. Abnormal signal intensity in the calcaneus consistent with involvement by osteomyelitis. Abnormal signal intensity in the plantar soft tissues over the medial aspect   of the calcaneus consistent with inflammatory process. There was air in the soft tissues seen on the plain radiographs. Degenerative changes. Osteochondral defect in the talar dome. Spurring at the attachment of the Achilles tendon upon the calcaneus. Soft tissue swelling over the foot.   -- X-ray right foot 7/1: No fracture or dislocation   -- Podiatry following, continue to appreciate recommendations.   -- Taken to the OR on 7/5 for right heel partial calcanectomy and debridement of wound.  Patient tolerated procedure well. Continue wound vac. Pt and family agreeable to SNF.   -- Continue daily dressing   -- MRSA negative   -- ID following, appreciate recommendations  -- S/p vancomycin (7/1-7/3)  -- s/p Zosyn (7/1-7/5). Pt initially wanting to transition to po Abx, but as pt is now amenable to SNF at [place] may consider contining IV Abx at [place] vs oral Abx. Will follow up ID for recs.    -- blood culture x 2 NG 5D  -- Heel culture grew Pseudomonas aeruginosa   --Bone and foot cx: probable ESBL producing Klebsiella, Strep agalactiae and Enterococcus avium   -- Started on meropenem per ID (7/8-  -- Leukocytosis improving.  White blood cell count is 6.7 on 7/11  -- CRP 15.80 on 7/2  -- Of note, Per chart review, Patient underwent bedside        All radiology images and reports reviewed and interpreted by me:  Radiology:  Vascular duplex lower extremity arteries bilateral   Final Result   Excellent pulsatility throughout both legs.            **This report has been created using voice recognition software.  It may contain   minor errors which are inherent in voice recognition technology.**            Electronically signed by Dr. John Wu      MRI FOOT RIGHT WO CONTRAST   Final Result   1. Soft tissue defect in the plantar soft tissues over the calcaneus.   2. Abnormal signal intensity in the calcaneus consistent with involvement by   osteomyelitis.   3. Abnormal signal intensity in the plantar soft tissues over the medial aspect   of the calcaneus consistent with inflammatory process. There was air in the soft   tissues seen on the plain radiographs.   4. Degenerative changes.   5. Osteochondral defect in the talar dome.   6. Spurring at the attachment of the Achilles tendon upon the calcaneus.   7. Soft tissue swelling over the foot.               **This report has been created using voice recognition software. It may contain   minor errors which are inherent in voice recognition technology.**         Electronically signed by Dr. Jayne Terry      XR FOOT RIGHT (MIN 3 VIEWS)   Final Result      No fracture or dislocation.            **This report has been created using voice recognition software. It may contain   minor errors which are inherent in voice recognition technology.**         Electronically signed by Dr. Yung Walker          Diet: ADULT ORAL NUTRITION SUPPLEMENT; Dinner; Wound Healing Oral Supplement  ADULT DIET; Regular; 5 carb choices (75 gm/meal)    Microbiology: yes - reviewed  Antibiotics: yes - continue    Steroids: no    Telemetry: []Yes / [x]No  Telemetry Review of past 24 hours: Not Applicable    LDA: []CVC / []PICC / [x]Midline / []Sears / []Drains / []Mediport / []PIV / []None    Labs (still needed?): [x]Yes /  []No  IVF (still needed?): []Yes / [x]No    Level of care: []Step Down / [x]Med-Surg  Bed Status: [x]Inpatient / []Observation    DVT Prophylaxis: [x] Lovenox / [] Heparin / [] SCDs / [] Already on Systemic Anticoagulation / [] None     PT/OT: [x]Yes / []No    Disposition:    [] Home       [] TCU       [] Rehab       [] Psych       [x] SNF       [] Long Term Care Facility       [x] Other- SNF vs Home with HH    Code Status: DNR-CCA      An electronic signature was used to authenticate this note  - Josefina Loya MD PGY-2 on 7/11/2024 at 2:27 PM

## 2024-07-11 NOTE — PROGRESS NOTES
[facility]  PHYSICAL THERAPY MISSED TREATMENT NOTE  [facility]THOPED[facility] 7K    Date: 2024  Patient Name: Niya Blankenship        MRN: [id number]   : 1950  (74 y.o.)  Gender: female   Referring Practitioner: Josefina Loya MD  Diagnosis: Diabetic ulcer of right heel associated with type 2 diabetes mellitus         REASON FOR MISSED TREATMENT:  Missed Treat.      RN approved therapy session. Patient supine in bed with HOB elevated upon PTA arrival. Patient with 2 family members in her room assisting patient with breakfast tray. Will re-attempt for PT treatment if able.

## 2024-07-11 NOTE — CARE COORDINATION
Patient and family state they have used [facility] in the past for HH.  Called to place a referral.  Phone number for HH [phone], fax number [phone].  Placed referral please fax the orders to above fax number.  They will take the referral as soon as they receive the orders.      1041 a.m. [facility] will not take the patient.

## 2024-07-11 NOTE — CARE COORDINATION
7/11/24, 1:42 PM EDT    DISCHARGE PLANNING EVALUATION    Spoke with [facility]  of finances, explained family's preference for patient to go to [facility] if financial arrangements can be made.  Explained that family had agreed to speak with their community representatives that manage financial agreements for medical care and then speak with [facility] staff to try and make arrangements for payment.       [facility] is still considering for admission if financial agreements can be made.

## 2024-07-11 NOTE — CARE COORDINATION
Home Wound Vac updates     0715 Faxed available wound vac paperwork to Jorge Luis Nuñez at [facility]. Emailed with a request to callback.     3347 Spoke with son Leland Blankenship with patient present and included, but not active in conversation. He shared that in the past Niya has used [facility] for home health ([phone]), and that [facility] had assisted with wound vac financially ([phone]). Leland also confirmed during this conversation they have the ability to provide power to the Vac and IV pump.     0905 Spoke with Tomy at [facility] . He needs  or deacon of [facility], or [facility] to determine eligibility for assistance.     1030 After speaking with Niya and family about ministry number, they shared that the family member who planned to take Niya in no longer can due to having a daughter in the hospital. Leland shares that no one else in the family is comfortable having her stay due to the risks with providing power for equipment. Niya and family would like to return to the previous plan of dc to [facility].      1036 Visited patient and family at the bedside with Elizabeth SMITH. See SW note.

## 2024-07-12 LAB
ANION GAP SERPL CALC-SCNC: 11 MEQ/L (ref 8–16)
BASOPHILS ABSOLUTE: 0.1 THOU/MM3 (ref 0–0.1)
BASOPHILS NFR BLD AUTO: 1.4 %
BUN SERPL-MCNC: 33 MG/DL (ref 7–22)
CALCIUM SERPL-MCNC: 8.9 MG/DL (ref 8.5–10.5)
CHLORIDE SERPL-SCNC: 99 MEQ/L (ref 98–111)
CO2 SERPL-SCNC: 26 MEQ/L (ref 23–33)
CREAT SERPL-MCNC: 1 MG/DL (ref 0.4–1.2)
DEPRECATED RDW RBC AUTO: 48.1 FL (ref 35–45)
EOSINOPHIL NFR BLD AUTO: 9 %
EOSINOPHILS ABSOLUTE: 0.5 THOU/MM3 (ref 0–0.4)
ERYTHROCYTE [DISTWIDTH] IN BLOOD BY AUTOMATED COUNT: 15.7 % (ref 11.5–14.5)
GFR SERPL CREATININE-BSD FRML MDRD: 59 ML/MIN/1.73M2
GLUCOSE BLD STRIP.AUTO-MCNC: 130 MG/DL (ref 70–108)
GLUCOSE BLD STRIP.AUTO-MCNC: 171 MG/DL (ref 70–108)
GLUCOSE BLD STRIP.AUTO-MCNC: 177 MG/DL (ref 70–108)
GLUCOSE BLD STRIP.AUTO-MCNC: 242 MG/DL (ref 70–108)
GLUCOSE SERPL-MCNC: 173 MG/DL (ref 70–108)
HCT VFR BLD AUTO: 28.4 % (ref 37–47)
HGB BLD-MCNC: 8.5 GM/DL (ref 12–16)
IMM GRANULOCYTES # BLD AUTO: 0.05 THOU/MM3 (ref 0–0.07)
IMM GRANULOCYTES NFR BLD AUTO: 0.9 %
LYMPHOCYTES ABSOLUTE: 1.6 THOU/MM3 (ref 1–4.8)
LYMPHOCYTES NFR BLD AUTO: 28.8 %
MCH RBC QN AUTO: 25.2 PG (ref 26–33)
MCHC RBC AUTO-ENTMCNC: 29.9 GM/DL (ref 32.2–35.5)
MCV RBC AUTO: 84.3 FL (ref 81–99)
MONOCYTES ABSOLUTE: 0.7 THOU/MM3 (ref 0.4–1.3)
MONOCYTES NFR BLD AUTO: 12.8 %
NEUTROPHILS ABSOLUTE: 2.6 THOU/MM3 (ref 1.8–7.7)
NEUTROPHILS NFR BLD AUTO: 47.1 %
NRBC BLD AUTO-RTO: 0 /100 WBC
PLATELET # BLD AUTO: 462 THOU/MM3 (ref 130–400)
PMV BLD AUTO: 10.6 FL (ref 9.4–12.4)
POTASSIUM SERPL-SCNC: 4.6 MEQ/L (ref 3.5–5.2)
RBC # BLD AUTO: 3.37 MILL/MM3 (ref 4.2–5.4)
SODIUM SERPL-SCNC: 136 MEQ/L (ref 135–145)
WBC # BLD AUTO: 5.6 THOU/MM3 (ref 4.8–10.8)

## 2024-07-12 PROCEDURE — 6370000000 HC RX 637 (ALT 250 FOR IP)

## 2024-07-12 PROCEDURE — 6360000002 HC RX W HCPCS: Performed by: INTERNAL MEDICINE

## 2024-07-12 PROCEDURE — 82948 REAGENT STRIP/BLOOD GLUCOSE: CPT

## 2024-07-12 PROCEDURE — 36415 COLL VENOUS BLD VENIPUNCTURE: CPT

## 2024-07-12 PROCEDURE — 1200000000 HC SEMI PRIVATE

## 2024-07-12 PROCEDURE — 2580000003 HC RX 258: Performed by: INTERNAL MEDICINE

## 2024-07-12 PROCEDURE — 6360000002 HC RX W HCPCS

## 2024-07-12 PROCEDURE — 99233 SBSQ HOSP IP/OBS HIGH 50: CPT | Performed by: INTERNAL MEDICINE

## 2024-07-12 PROCEDURE — 80048 BASIC METABOLIC PNL TOTAL CA: CPT

## 2024-07-12 PROCEDURE — 85025 COMPLETE CBC W/AUTO DIFF WBC: CPT

## 2024-07-12 PROCEDURE — 2580000003 HC RX 258

## 2024-07-12 RX ADMIN — ACETAMINOPHEN 650 MG: 325 TABLET ORAL at 17:01

## 2024-07-12 RX ADMIN — MEROPENEM 1000 MG: 1 INJECTION INTRAVENOUS at 18:13

## 2024-07-12 RX ADMIN — INSULIN LISPRO 10 UNITS: 100 INJECTION, SOLUTION INTRAVENOUS; SUBCUTANEOUS at 13:09

## 2024-07-12 RX ADMIN — INSULIN LISPRO 10 UNITS: 100 INJECTION, SOLUTION INTRAVENOUS; SUBCUTANEOUS at 17:01

## 2024-07-12 RX ADMIN — AMLODIPINE BESYLATE 2.5 MG: 2.5 TABLET ORAL at 11:01

## 2024-07-12 RX ADMIN — LISINOPRIL 40 MG: 40 TABLET ORAL at 11:01

## 2024-07-12 RX ADMIN — SODIUM CHLORIDE, PRESERVATIVE FREE 10 ML: 5 INJECTION INTRAVENOUS at 21:15

## 2024-07-12 RX ADMIN — ACETAMINOPHEN 650 MG: 325 TABLET ORAL at 22:12

## 2024-07-12 RX ADMIN — ACETAMINOPHEN 650 MG: 325 TABLET ORAL at 11:14

## 2024-07-12 RX ADMIN — INSULIN LISPRO 10 UNITS: 100 INJECTION, SOLUTION INTRAVENOUS; SUBCUTANEOUS at 10:52

## 2024-07-12 RX ADMIN — ESCITALOPRAM OXALATE 10 MG: 10 TABLET, FILM COATED ORAL at 11:02

## 2024-07-12 RX ADMIN — ENOXAPARIN SODIUM 40 MG: 100 INJECTION SUBCUTANEOUS at 10:53

## 2024-07-12 RX ADMIN — INSULIN LISPRO 2 UNITS: 100 INJECTION, SOLUTION INTRAVENOUS; SUBCUTANEOUS at 13:09

## 2024-07-12 RX ADMIN — INSULIN GLARGINE 15 UNITS: 100 INJECTION, SOLUTION SUBCUTANEOUS at 22:14

## 2024-07-12 RX ADMIN — SODIUM CHLORIDE, PRESERVATIVE FREE 10 ML: 5 INJECTION INTRAVENOUS at 10:35

## 2024-07-12 RX ADMIN — MEROPENEM 1000 MG: 1 INJECTION INTRAVENOUS at 05:51

## 2024-07-12 ASSESSMENT — PAIN SCALES - GENERAL
PAINLEVEL_OUTOF10: 6
PAINLEVEL_OUTOF10: 4
PAINLEVEL_OUTOF10: 0
PAINLEVEL_OUTOF10: 5
PAINLEVEL_OUTOF10: 3
PAINLEVEL_OUTOF10: 0
PAINLEVEL_OUTOF10: 0

## 2024-07-12 ASSESSMENT — PAIN DESCRIPTION - LOCATION
LOCATION: FOOT
LOCATION: LEG;FOOT

## 2024-07-12 ASSESSMENT — PAIN DESCRIPTION - DESCRIPTORS
DESCRIPTORS: ACHING
DESCRIPTORS: ACHING;THROBBING

## 2024-07-12 ASSESSMENT — PAIN SCALES - WONG BAKER: WONGBAKER_NUMERICALRESPONSE: NO HURT

## 2024-07-12 ASSESSMENT — PAIN DESCRIPTION - ORIENTATION
ORIENTATION: RIGHT
ORIENTATION: RIGHT

## 2024-07-12 NOTE — PLAN OF CARE
Problem: Discharge Planning  Goal: Discharge to home or other facility with appropriate resources  Outcome: Progressing  Flowsheets (Taken 7/12/2024 1743)  Discharge to home or other facility with appropriate resources:   Identify barriers to discharge with patient and caregiver   Arrange for needed discharge resources and transportation as appropriate   Identify discharge learning needs (meds, wound care, etc)     Problem: Safety - Adult  Goal: Free from fall injury  Outcome: Progressing  Flowsheets (Taken 7/12/2024 1743)  Free From Fall Injury: Instruct family/caregiver on patient safety     Problem: Skin/Tissue Integrity  Goal: Absence of new skin breakdown  Description: 1.  Monitor for areas of redness and/or skin breakdown  2.  Assess vascular access sites hourly  3.  Every 4-6 hours minimum:  Change oxygen saturation probe site  4.  Every 4-6 hours:  If on nasal continuous positive airway pressure, respiratory therapy assess nares and determine need for appliance change or resting period.  Outcome: Progressing  Note: No new skin breakdown noted at this time this shift.     Problem: Pain  Goal: Verbalizes/displays adequate comfort level or baseline comfort level  Outcome: Progressing  Flowsheets (Taken 7/12/2024 1743)  Verbalizes/displays adequate comfort level or baseline comfort level:   Encourage patient to monitor pain and request assistance   Assess pain using appropriate pain scale   Administer analgesics based on type and severity of pain and evaluate response   Implement non-pharmacological measures as appropriate and evaluate response     Problem: Skin/Tissue Integrity - Adult  Goal: Skin integrity remains intact  Outcome: Progressing  Flowsheets (Taken 7/12/2024 1743)  Skin Integrity Remains Intact: Monitor for areas of redness and/or skin breakdown     Problem: Skin/Tissue Integrity - Adult  Goal: Incisions, wounds, or drain sites healing without S/S of infection  Outcome: Progressing  Flowsheets

## 2024-07-12 NOTE — PROGRESS NOTES
Podiatric Progress Note  Niya Blankenship  Subjective :   7/12  Patient seen bedside today on behalf of Dr Phelan. Patient was alert and oriented to person, place, time, and event. 7 days s/p partial calcanectomy and debridement with placement of wound VAC. Patient had family at bedside. Patient denies any pain to her foot. Denies any new symptoms. No other pedal concerns.    7/10/24  Patient was seen bedside today on behalf of Dr. hPelan. Patient was alert and oriented to person, place, time, and event. Resident with the primary team was present in room. Patient is 5 days s/p partial calcanectomy and debridement with placement of wound VAC. Patient had family at bedside. Patient denies any pain to her foot. Relates that she feels great with no constitutional symptoms. Patient states she did not eat dinner last night because she does not like this food. No other pedal complaints at this time.    7/9   Patient seen bedside today alongside Dr. Phelan patient was alert oriented to person place time and event. Patient is status postop 4 days partial calcanectomy and debridement with placement of wound VAC.  Patient had family at bedside. Patient denies any pain to her foot patient said she feels great with no constitutional symptoms. Patient expressed her desire to be placed in a skilled nursing facility of her choice.  No other pedal complaints at this time.    7/8   Patient seen bedside today on behalf of Dr. Phelan.  Patient alert and oriented to person, place, time and event.  Patient is s/p 3 days partial calcanectomy and debridement.  Patient has family present at bedside.  She denies any pain to her foot this morning or issues with back over the weekend.  Patient denies any constitutional symptoms.  No other pedal concerns.    7/6  Patient seen bedside today on behalf of Dr Phelan. Patient is alert and oriented to person, place, time and event. 1 day s/p partial calcanectomy and debridement. Patient's family is present at  receiving wound care with Dr. Phelan in [place] and has been using b&w on the wound. However, she states felt like she was having chills yesterday and her daughters stated they felt her heel wound had gotten worse so she decided to come to the ED today to have it looked at. Patient denies any N/V/F/SOB or CP. No other pedal concerns.      Current Medications:    Current Facility-Administered Medications: meropenem (MERREM) 1,000 mg in sodium chloride 0.9 % 100 mL IVPB (mini-bag), 1,000 mg, IntraVENous, Q12H  hydrocortisone 1 % cream, , Topical, BID PRN  insulin glargine (LANTUS) injection vial 15 Units, 15 Units, SubCUTAneous, Nightly  insulin lispro (HUMALOG,ADMELOG) injection vial 10 Units, 10 Units, SubCUTAneous, TID WC  amLODIPine (NORVASC) tablet 2.5 mg, 2.5 mg, Oral, Daily  insulin lispro (HUMALOG,ADMELOG) injection vial 0-8 Units, 0-8 Units, SubCUTAneous, TID WC  insulin lispro (HUMALOG,ADMELOG) injection vial 0-4 Units, 0-4 Units, SubCUTAneous, Nightly  lisinopril (PRINIVIL;ZESTRIL) tablet 40 mg, 40 mg, Oral, Daily  hydrOXYzine pamoate (VISTARIL) capsule 25 mg, 25 mg, Oral, TID PRN  hydrALAZINE (APRESOLINE) injection 10 mg, 10 mg, IntraVENous, Q6H PRN  potassium chloride (KLOR-CON M) extended release tablet 40 mEq, 40 mEq, Oral, Once  sodium chloride flush 0.9 % injection 5-40 mL, 5-40 mL, IntraVENous, 2 times per day  sodium chloride flush 0.9 % injection 5-40 mL, 5-40 mL, IntraVENous, PRN  0.9 % sodium chloride infusion, , IntraVENous, PRN  potassium chloride (KLOR-CON M) extended release tablet 40 mEq, 40 mEq, Oral, PRN **OR** potassium bicarb-citric acid (EFFER-K) effervescent tablet 40 mEq, 40 mEq, Oral, PRN **OR** potassium chloride 10 mEq/100 mL IVPB (Peripheral Line), 10 mEq, IntraVENous, PRN  enoxaparin (LOVENOX) injection 40 mg, 40 mg, SubCUTAneous, Daily  ondansetron (ZOFRAN-ODT) disintegrating tablet 4 mg, 4 mg, Oral, Q8H PRN **OR** ondansetron (ZOFRAN) injection 4 mg, 4 mg, IntraVENous, Q6H

## 2024-07-12 NOTE — PLAN OF CARE
Problem: Discharge Planning  Goal: Discharge to home or other facility with appropriate resources  Outcome: Progressing     Problem: Safety - Adult  Goal: Free from fall injury  Outcome: Progressing     Problem: Skin/Tissue Integrity  Goal: Absence of new skin breakdown  Description: 1.  Monitor for areas of redness and/or skin breakdown  2.  Assess vascular access sites hourly  3.  Every 4-6 hours minimum:  Change oxygen saturation probe site  4.  Every 4-6 hours:  If on nasal continuous positive airway pressure, respiratory therapy assess nares and determine need for appliance change or resting period.  Outcome: Progressing     Problem: Pain  Goal: Verbalizes/displays adequate comfort level or baseline comfort level  Outcome: Progressing     Problem: Skin/Tissue Integrity - Adult  Goal: Skin integrity remains intact  Outcome: Progressing  Goal: Incisions, wounds, or drain sites healing without S/S of infection  Outcome: Progressing     Problem: Chronic Conditions and Co-morbidities  Goal: Patient's chronic conditions and co-morbidity symptoms are monitored and maintained or improved  Outcome: Progressing     Problem: Nutrition Deficit:  Goal: Optimize nutritional status  Outcome: Progressing

## 2024-07-12 NOTE — CARE COORDINATION
7/12/24, 12:22 PM EDT    DISCHARGE PLANNING EVALUATION    Spoke with patient and a family member.  Family member who is here today is not aware of financial arrangements with [facility] and does not know if family has been in contact with the snf.     Call made to [place] [facility], message left for admissions requesting return call if any updates or discussion with family

## 2024-07-12 NOTE — PROGRESS NOTES
Progress note: Infectious diseases    Patient - Niya Blankenship,  Age - 74 y.o.    - 1950      Room Number - 7K-12/012-A   MRN -  [id number]   [facility] # - [id number]  Date of Admission -  2024  1:19 PM    SUBJECTIVE:   No new issues.  Awaiting placement  OBJECTIVE   VITALS    height is 1.626 m (5' 4\") and weight is 59 kg (130 lb). Her oral temperature is 98 °F (36.7 °C). Her blood pressure is 157/73 (abnormal) and her pulse is 78. Her respiration is 16 and oxygen saturation is 99%.       Wt Readings from Last 3 Encounters:   24 59 kg (130 lb)   21 70.3 kg (155 lb)       I/O (24 Hours)    Intake/Output Summary (Last 24 hours) at 2024 1007  Last data filed at 2024 0451  Gross per 24 hour   Intake 560 ml   Output 300 ml   Net 260 ml         General Appearance  Awake, alert, oriented,  not  In acute distress  HEENT - normocephalic, atraumatic, pale  conjunctiva,  anicteric sclera  Neck - Supple, no mass  Lungs -  Bilateral  air entry, no rhonchi, no wheeze  Cardiovascular - Heart sounds are normal.    Abdomen - soft, not distended, nontender,   Neurologic -oriented  Skin - No bruising or bleeding  Extremities -  wound vac to the right foot wound     MEDICATIONS:      meropenem  1,000 mg IntraVENous Q12H    insulin glargine  15 Units SubCUTAneous Nightly    insulin lispro  10 Units SubCUTAneous TID WC    amLODIPine  2.5 mg Oral Daily    insulin lispro  0-8 Units SubCUTAneous TID WC    insulin lispro  0-4 Units SubCUTAneous Nightly    lisinopril  40 mg Oral Daily    potassium chloride  40 mEq Oral Once    sodium chloride flush  5-40 mL IntraVENous 2 times per day    enoxaparin  40 mg SubCUTAneous Daily    escitalopram  10 mg Oral Daily      sodium chloride      dextrose       hydrocortisone, hydrOXYzine pamoate, hydrALAZINE, sodium chloride flush, sodium chloride, potassium chloride **OR** potassium

## 2024-07-12 NOTE — PROGRESS NOTES
PROGRESS NOTE      Patient:  Niya Blankenship  Unit/Bed:7K-12/012-A  YOB: 1950  MRN: [id number]   Acct: [id number]    PCP: Taylor Mzaa APRN - NP    Date of Admission: 7/1/2024 LOS: 11    Date of Evaluation:  7/12/2024    Anticipated Discharge: pending placement    Assessment/Plan:    Osteomyelitis of right foot with leukocytosis  S/P excisional debridement of right heel and partial right calcanectomy on 7/5  -- MRI right foot 7/2: Soft tissue defect in the plantar soft tissues over the calcaneus. Abnormal signal intensity in the calcaneus consistent with involvement by osteomyelitis. Abnormal signal intensity in the plantar soft tissues over the medial aspect   of the calcaneus consistent with inflammatory process. There was air in the soft tissues seen on the plain radiographs. Degenerative changes. Osteochondral defect in the talar dome. Spurring at the attachment of the Achilles tendon upon the calcaneus. Soft tissue swelling over the foot.   -- X-ray right foot 7/1: No fracture or dislocation   -- Podiatry following, continue to appreciate recommendations.   -- Taken to the OR on 7/5 for right heel partial calcanectomy and debridement of wound.  Patient tolerated procedure well. Continue wound vac. Pt and family agreeable to SNF.   -- Continue daily dressing   -- MRSA negative   -- CRP 15.80 on 7/2  -- ID following, appreciate recommendations  -- S/p vancomycin (7/1-7/3)  -- s/p Zosyn (7/1-7/5). Pt initially wanting to transition to po Abx, but as pt is now amenable to SNF at [place] may consider contining IV Abx at [facility] vs oral Abx. Will follow up ID for recs.    -- blood culture x 2 NG 5D  -- Heel culture grew Pseudomonas aeruginosa   --Bone and foot cx: probable ESBL producing Klebsiella, Strep agalactiae and Enterococcus avium   -- Started on meropenem per ID (7/8-  -- Leukocytosis improving.  White blood cell count as of 7/12 is 5.6  -- Of note, Per chart review, Patient underwent bedside  **OR** acetaminophen, zinc oxide, glucose, dextrose bolus **OR** dextrose bolus, glucagon (rDNA), dextrose      Intake/Output Summary (Last 24 hours) at 7/12/2024 1313  Last data filed at 7/12/2024 1045  Gross per 24 hour   Intake 920 ml   Output 300 ml   Net 620 ml       Exam:  BP (!) 152/74   Pulse 89   Temp 98.6 °F (37 °C) (Oral)   Resp 16   Ht 1.626 m (5' 4\")   Wt 59 kg (130 lb)   SpO2 98%   BMI 22.31 kg/m²     Physical Exam  Vitals reviewed.   Constitutional:       General: She is not in acute distress.     Appearance: Normal appearance.   HENT:      Head: Normocephalic and atraumatic.   Cardiovascular:      Rate and Rhythm: Normal rate and regular rhythm.      Heart sounds: Normal heart sounds.   Pulmonary:      Effort: Pulmonary effort is normal. No respiratory distress.      Breath sounds: Normal breath sounds.   Abdominal:      Palpations: Abdomen is soft.      Tenderness: There is no abdominal tenderness. There is no guarding.   Musculoskeletal:         General: No tenderness.      Comments: Right LE in dressing   Skin:     General: Skin is warm.   Neurological:      General: No focal deficit present.      Mental Status: She is alert. Mental status is at baseline.   Psychiatric:         Mood and Affect: Mood normal.        All labs reviewed and interpreted by me:  Labs:   Recent Labs     07/10/24  0551 07/11/24  0615 07/12/24  0611   WBC 7.2 6.7 5.6   HGB 8.7* 8.5* 8.5*   HCT 28.3* 28.0* 28.4*   * 462* 462*     Recent Labs     07/10/24  0551 07/11/24  0615 07/12/24  0611    137 136   K 3.7 4.1 4.6   CL 99 100 99   CO2 28 25 26   BUN 16 18 33*   CREATININE 0.8 0.8 1.0   CALCIUM 8.9 8.7 8.9     No results for input(s): \"AST\", \"ALT\", \"BILIDIR\", \"BILITOT\", \"ALKPHOS\" in the last 72 hours.  No results for input(s): \"INR\" in the last 72 hours.  No results for input(s): \"CKTOTAL\", \"TROPONINT\" in the last 72 hours.    Urinalysis:      Lab Results   Component Value Date/Time    NITRU NEGATIVE  no    Telemetry: []Yes / [x]No  Telemetry Review of past 24 hours: Not Applicable    LDA: []CVC / []PICC / [x]Midline / []Sears / []Drains / []Mediport / []PIV / []None    Labs (still needed?): [x]Yes / []No  IVF (still needed?): []Yes / [x]No    Level of care: []Step Down / [x]Med-Surg  Bed Status: [x]Inpatient / []Observation    DVT Prophylaxis: [x] Lovenox / [] Heparin / [] SCDs / [] Already on Systemic Anticoagulation / [] None     PT/OT: [x]Yes / []No    Disposition:    [] Home       [] TCU       [] Rehab       [] Psych       [x] SNF       [] Long Term Care Facility       [] Other-    Code Status: DNR-CCA      An electronic signature was used to authenticate this note  - Josefina Loya MD PGY-2 on 7/12/2024 at 1:13 PM

## 2024-07-13 LAB
ANION GAP SERPL CALC-SCNC: 13 MEQ/L (ref 8–16)
BASOPHILS ABSOLUTE: 0.1 THOU/MM3 (ref 0–0.1)
BASOPHILS NFR BLD AUTO: 1.2 %
BUN SERPL-MCNC: 30 MG/DL (ref 7–22)
CALCIUM SERPL-MCNC: 9.3 MG/DL (ref 8.5–10.5)
CHLORIDE SERPL-SCNC: 99 MEQ/L (ref 98–111)
CO2 SERPL-SCNC: 24 MEQ/L (ref 23–33)
CREAT SERPL-MCNC: 0.9 MG/DL (ref 0.4–1.2)
DEPRECATED RDW RBC AUTO: 47.9 FL (ref 35–45)
EOSINOPHIL NFR BLD AUTO: 5.5 %
EOSINOPHILS ABSOLUTE: 0.4 THOU/MM3 (ref 0–0.4)
ERYTHROCYTE [DISTWIDTH] IN BLOOD BY AUTOMATED COUNT: 15.8 % (ref 11.5–14.5)
GFR SERPL CREATININE-BSD FRML MDRD: 67 ML/MIN/1.73M2
GLUCOSE BLD STRIP.AUTO-MCNC: 144 MG/DL (ref 70–108)
GLUCOSE BLD STRIP.AUTO-MCNC: 168 MG/DL (ref 70–108)
GLUCOSE BLD STRIP.AUTO-MCNC: 191 MG/DL (ref 70–108)
GLUCOSE BLD STRIP.AUTO-MCNC: 214 MG/DL (ref 70–108)
GLUCOSE SERPL-MCNC: 140 MG/DL (ref 70–108)
HCT VFR BLD AUTO: 29.9 % (ref 37–47)
HGB BLD-MCNC: 9.1 GM/DL (ref 12–16)
IMM GRANULOCYTES # BLD AUTO: 0.04 THOU/MM3 (ref 0–0.07)
IMM GRANULOCYTES NFR BLD AUTO: 0.6 %
LYMPHOCYTES ABSOLUTE: 1.6 THOU/MM3 (ref 1–4.8)
LYMPHOCYTES NFR BLD AUTO: 24.2 %
MCH RBC QN AUTO: 25.3 PG (ref 26–33)
MCHC RBC AUTO-ENTMCNC: 30.4 GM/DL (ref 32.2–35.5)
MCV RBC AUTO: 83.3 FL (ref 81–99)
MONOCYTES ABSOLUTE: 0.7 THOU/MM3 (ref 0.4–1.3)
MONOCYTES NFR BLD AUTO: 10.6 %
NEUTROPHILS ABSOLUTE: 3.9 THOU/MM3 (ref 1.8–7.7)
NEUTROPHILS NFR BLD AUTO: 57.9 %
NRBC BLD AUTO-RTO: 0 /100 WBC
PLATELET # BLD AUTO: 486 THOU/MM3 (ref 130–400)
PMV BLD AUTO: 10.5 FL (ref 9.4–12.4)
POTASSIUM SERPL-SCNC: 4.8 MEQ/L (ref 3.5–5.2)
RBC # BLD AUTO: 3.59 MILL/MM3 (ref 4.2–5.4)
SODIUM SERPL-SCNC: 136 MEQ/L (ref 135–145)
WBC # BLD AUTO: 6.7 THOU/MM3 (ref 4.8–10.8)

## 2024-07-13 PROCEDURE — 99232 SBSQ HOSP IP/OBS MODERATE 35: CPT | Performed by: INTERNAL MEDICINE

## 2024-07-13 PROCEDURE — 36415 COLL VENOUS BLD VENIPUNCTURE: CPT

## 2024-07-13 PROCEDURE — 6370000000 HC RX 637 (ALT 250 FOR IP)

## 2024-07-13 PROCEDURE — 6360000002 HC RX W HCPCS: Performed by: INTERNAL MEDICINE

## 2024-07-13 PROCEDURE — 80048 BASIC METABOLIC PNL TOTAL CA: CPT

## 2024-07-13 PROCEDURE — 6360000002 HC RX W HCPCS

## 2024-07-13 PROCEDURE — 85025 COMPLETE CBC W/AUTO DIFF WBC: CPT

## 2024-07-13 PROCEDURE — 82948 REAGENT STRIP/BLOOD GLUCOSE: CPT

## 2024-07-13 PROCEDURE — 2580000003 HC RX 258: Performed by: INTERNAL MEDICINE

## 2024-07-13 PROCEDURE — 1200000000 HC SEMI PRIVATE

## 2024-07-13 PROCEDURE — 2580000003 HC RX 258

## 2024-07-13 RX ADMIN — ACETAMINOPHEN 650 MG: 325 TABLET ORAL at 21:47

## 2024-07-13 RX ADMIN — ESCITALOPRAM OXALATE 10 MG: 10 TABLET, FILM COATED ORAL at 09:22

## 2024-07-13 RX ADMIN — SODIUM CHLORIDE, PRESERVATIVE FREE 10 ML: 5 INJECTION INTRAVENOUS at 09:22

## 2024-07-13 RX ADMIN — INSULIN LISPRO 10 UNITS: 100 INJECTION, SOLUTION INTRAVENOUS; SUBCUTANEOUS at 17:01

## 2024-07-13 RX ADMIN — MEROPENEM 1000 MG: 1 INJECTION INTRAVENOUS at 18:12

## 2024-07-13 RX ADMIN — ENOXAPARIN SODIUM 40 MG: 100 INJECTION SUBCUTANEOUS at 09:21

## 2024-07-13 RX ADMIN — ACETAMINOPHEN 650 MG: 325 TABLET ORAL at 07:11

## 2024-07-13 RX ADMIN — ACETAMINOPHEN 650 MG: 325 TABLET ORAL at 15:27

## 2024-07-13 RX ADMIN — INSULIN GLARGINE 15 UNITS: 100 INJECTION, SOLUTION SUBCUTANEOUS at 19:59

## 2024-07-13 RX ADMIN — AMLODIPINE BESYLATE 2.5 MG: 2.5 TABLET ORAL at 09:21

## 2024-07-13 RX ADMIN — INSULIN LISPRO 10 UNITS: 100 INJECTION, SOLUTION INTRAVENOUS; SUBCUTANEOUS at 12:52

## 2024-07-13 RX ADMIN — INSULIN LISPRO 10 UNITS: 100 INJECTION, SOLUTION INTRAVENOUS; SUBCUTANEOUS at 09:21

## 2024-07-13 RX ADMIN — MEROPENEM 1000 MG: 1 INJECTION INTRAVENOUS at 06:01

## 2024-07-13 RX ADMIN — LISINOPRIL 40 MG: 40 TABLET ORAL at 09:21

## 2024-07-13 ASSESSMENT — PAIN SCALES - GENERAL
PAINLEVEL_OUTOF10: 2
PAINLEVEL_OUTOF10: 6
PAINLEVEL_OUTOF10: 2
PAINLEVEL_OUTOF10: 3
PAINLEVEL_OUTOF10: 3

## 2024-07-13 ASSESSMENT — PAIN DESCRIPTION - ORIENTATION
ORIENTATION: RIGHT
ORIENTATION: RIGHT

## 2024-07-13 ASSESSMENT — PAIN DESCRIPTION - DESCRIPTORS
DESCRIPTORS: ACHING;THROBBING
DESCRIPTORS: ACHING

## 2024-07-13 ASSESSMENT — PAIN - FUNCTIONAL ASSESSMENT: PAIN_FUNCTIONAL_ASSESSMENT: ACTIVITIES ARE NOT PREVENTED

## 2024-07-13 ASSESSMENT — PAIN DESCRIPTION - LOCATION
LOCATION: FOOT
LOCATION: FOOT

## 2024-07-13 NOTE — PLAN OF CARE
Problem: Discharge Planning  Goal: Discharge to home or other facility with appropriate resources  Outcome: Progressing  Flowsheets (Taken 7/13/2024 1632)  Discharge to home or other facility with appropriate resources: Identify barriers to discharge with patient and caregiver     Problem: Safety - Adult  Goal: Free from fall injury  Outcome: Progressing  Flowsheets (Taken 7/13/2024 1632)  Free From Fall Injury: Instruct family/caregiver on patient safety     Problem: Skin/Tissue Integrity  Goal: Absence of new skin breakdown  Description: 1.  Monitor for areas of redness and/or skin breakdown  2.  Assess vascular access sites hourly  3.  Every 4-6 hours minimum:  Change oxygen saturation probe site  4.  Every 4-6 hours:  If on nasal continuous positive airway pressure, respiratory therapy assess nares and determine need for appliance change or resting period.  Outcome: Progressing     Problem: Pain  Goal: Verbalizes/displays adequate comfort level or baseline comfort level  Outcome: Progressing  Flowsheets (Taken 7/13/2024 1632)  Verbalizes/displays adequate comfort level or baseline comfort level: Assess pain using appropriate pain scale     Problem: Skin/Tissue Integrity - Adult  Goal: Skin integrity remains intact  Outcome: Progressing  Flowsheets (Taken 7/13/2024 1632)  Skin Integrity Remains Intact: Assess vascular access sites hourly  Goal: Incisions, wounds, or drain sites healing without S/S of infection  Outcome: Progressing  Flowsheets (Taken 7/13/2024 1632)  Incisions, Wounds, or Drain Sites Healing Without Sign and Symptoms of Infection: TWICE DAILY: Assess and document skin integrity     Problem: Chronic Conditions and Co-morbidities  Goal: Patient's chronic conditions and co-morbidity symptoms are monitored and maintained or improved  Outcome: Progressing  Flowsheets (Taken 7/13/2024 1632)  Care Plan - Patient's Chronic Conditions and Co-Morbidity Symptoms are Monitored and Maintained or Improved:

## 2024-07-13 NOTE — PLAN OF CARE
Problem: Discharge Planning  Goal: Discharge to home or other facility with appropriate resources  7/12/2024 2338 by Kimi Forbes RN  Outcome: Progressing  Flowsheets (Taken 7/12/2024 2100)  Discharge to home or other facility with appropriate resources:   Identify barriers to discharge with patient and caregiver   Arrange for needed discharge resources and transportation as appropriate  7/12/2024 1743 by Kesha Brannon LPN  Outcome: Progressing  Flowsheets (Taken 7/12/2024 1743)  Discharge to home or other facility with appropriate resources:   Identify barriers to discharge with patient and caregiver   Arrange for needed discharge resources and transportation as appropriate   Identify discharge learning needs (meds, wound care, etc)     Problem: Safety - Adult  Goal: Free from fall injury  7/12/2024 2338 by Kimi Forbes RN  Outcome: Progressing  7/12/2024 1743 by Kesha Brannon LPN  Outcome: Progressing  Flowsheets (Taken 7/12/2024 1743)  Free From Fall Injury: Instruct family/caregiver on patient safety     Problem: Skin/Tissue Integrity  Goal: Absence of new skin breakdown  Description: 1.  Monitor for areas of redness and/or skin breakdown  2.  Assess vascular access sites hourly  3.  Every 4-6 hours minimum:  Change oxygen saturation probe site  4.  Every 4-6 hours:  If on nasal continuous positive airway pressure, respiratory therapy assess nares and determine need for appliance change or resting period.  7/12/2024 2338 by Kimi Forbes RN  Outcome: Progressing  7/12/2024 1743 by Kesha Brannon LPN  Outcome: Progressing  Note: No new skin breakdown noted at this time this shift.     Problem: Pain  Goal: Verbalizes/displays adequate comfort level or baseline comfort level  7/12/2024 2338 by Kimi Forbes RN  Outcome: Progressing  7/12/2024 1743 by Kesha Brannon LPN  Outcome: Progressing  Flowsheets (Taken 7/12/2024 1743)  Verbalizes/displays adequate comfort level or baseline comfort  level:   Encourage patient to monitor pain and request assistance   Assess pain using appropriate pain scale   Administer analgesics based on type and severity of pain and evaluate response   Implement non-pharmacological measures as appropriate and evaluate response     Problem: Skin/Tissue Integrity - Adult  Goal: Skin integrity remains intact  7/12/2024 2338 by Kimi Forbes RN  Outcome: Progressing  Flowsheets (Taken 7/12/2024 2100)  Skin Integrity Remains Intact: Monitor for areas of redness and/or skin breakdown  7/12/2024 1743 by Kesha Brannon LPN  Outcome: Progressing  Flowsheets (Taken 7/12/2024 1743)  Skin Integrity Remains Intact: Monitor for areas of redness and/or skin breakdown  Goal: Incisions, wounds, or drain sites healing without S/S of infection  7/12/2024 2338 by Kimi Forbes RN  Outcome: Progressing  Flowsheets (Taken 7/12/2024 2100)  Incisions, Wounds, or Drain Sites Healing Without Sign and Symptoms of Infection: TWICE DAILY: Assess and document skin integrity  7/12/2024 1743 by Kesha Brannon LPN  Outcome: Progressing  Flowsheets (Taken 7/12/2024 1743)  Incisions, Wounds, or Drain Sites Healing Without Sign and Symptoms of Infection: TWICE DAILY: Assess and document skin integrity     Problem: Chronic Conditions and Co-morbidities  Goal: Patient's chronic conditions and co-morbidity symptoms are monitored and maintained or improved  7/12/2024 2338 by Kimi Forbes RN  Outcome: Progressing  Flowsheets (Taken 7/12/2024 2100)  Care Plan - Patient's Chronic Conditions and Co-Morbidity Symptoms are Monitored and Maintained or Improved: Monitor and assess patient's chronic conditions and comorbid symptoms for stability, deterioration, or improvement  7/12/2024 1743 by Kesha Brannon LPN  Outcome: Progressing  Flowsheets (Taken 7/12/2024 1743)  Care Plan - Patient's Chronic Conditions and Co-Morbidity Symptoms are Monitored and Maintained or Improved:   Monitor and assess patient's  chronic conditions and comorbid symptoms for stability, deterioration, or improvement   Collaborate with multidisciplinary team to address chronic and comorbid conditions and prevent exacerbation or deterioration     Problem: Nutrition Deficit:  Goal: Optimize nutritional status  7/12/2024 2338 by Kimi Forbes RN  Outcome: Progressing  7/12/2024 1743 by Kesha Brannon LPN  Outcome: Progressing  Flowsheets (Taken 7/12/2024 1743)  Nutrient intake appropriate for improving, restoring, or maintaining nutritional needs:   Assess nutritional status and recommend course of action   Monitor oral intake, labs, and treatment plans

## 2024-07-13 NOTE — CARE COORDINATION
CM Weekend Notes    7/13    Call received from primary RN. Niya's family is wondering why she was denied to [facility]. This will be a question for Monday morning, as LSW who is not here today may already know, or if not, there is no SNF office staff on the weekend to ask.     Call received from Dr Xiong, discussing discharge planning. He notified CM that family mentioned a referral to somewhere in [place]. Team will f/u Monday as referrals can't be made over the weekend, which he understands. Appreciate the notification to direct Monday planning.       Efforts to discharge home are still in process/on hold while SNF options are explored.   Currently no HH agency, [facility] (previous HH agency) denied. CM team will re-address finding HH agency if plan changes to discharge home.   I Wound vac [facility]a is still in process with [facility]. He will give an approval, denial and cost once order is processed (faxed Friday 7/12).   Home infusion is approved and would be set up through [facility] pharmacy. See Queenie Nowak note dated 7/10 for contact info and further details.     7/14    Dr Xiong called with an update. Family is asking about hospice care and still receiving abx, with 24 hr nursing care at home. He did attempt to have some discussion about hospice care and end of life options, but consulted palliative care and hospice both to see and explain options to patient and family.

## 2024-07-13 NOTE — PROGRESS NOTES
PROGRESS NOTE      Patient:  Niya Blankenship  Unit/Bed:7K-12/012-A  YOB: 1950  MRN: [id number]   Acct: [id number]    PCP: Taylor Maza APRN - NP    Date of Admission: 7/1/2024 LOS: 12    Date of Evaluation:  7/13/2024    Anticipated Discharge: pending placement     Assessment/Plan:    Osteomyelitis of right foot with leukocytosis  S/P excisional debridement of right heel and partial right calcanectomy on 7/5  -- MRI right foot 7/2: Soft tissue defect in the plantar soft tissues over the calcaneus. Abnormal signal intensity in the calcaneus consistent with involvement by osteomyelitis. Abnormal signal intensity in the plantar soft tissues over the medial aspect   of the calcaneus consistent with inflammatory process. There was air in the soft tissues seen on the plain radiographs. Degenerative changes. Osteochondral defect in the talar dome. Spurring at the attachment of the Achilles tendon upon the calcaneus. Soft tissue swelling over the foot.   -- X-ray right foot 7/1: No fracture or dislocation   -- Podiatry following, continue to appreciate recommendations.   -- Taken to the OR on 7/5 for right heel partial calcanectomy and debridement of wound.  Patient tolerated procedure well. Continue wound vac. Pt and family agreeable to SNF.   -- Continue daily dressing   -- MRSA negative   -- CRP 15.80 on 7/2  -- ID following, appreciate recommendations  -- S/p vancomycin (7/1-7/3)  -- s/p Zosyn (7/1-7/5). Pt initially wanting to transition to po Abx, but as pt is now amenable to SNF at [place] may consider contining IV Abx at [place] vs oral Abx. Will follow up ID for recs.    -- blood culture x 2 NG 5D  -- Heel culture grew Pseudomonas aeruginosa   --Bone and foot cx: probable ESBL producing Klebsiella, Strep agalactiae and Enterococcus avium   -- Started on meropenem per ID (7/8-  -- Leukocytosis improving.  White blood cell count as of 7/13 is 6.7  -- Of note, Per chart review, Patient underwent bedside       General: No deformity.      Comments: Right foot and ankle dressing noted.  Tenderness to palpation of the right plantar and dorsal aspects of foot over the bandage.  Patient is able to plantarflex and dorsiflex both feet with less motion on the right than the left.   Skin:     General: Skin is warm and dry.      Capillary Refill: Capillary refill takes less than 2 seconds.      Findings: No bruising.   Neurological:      Mental Status: She is alert. Mental status is at baseline.      Motor: Weakness present.      Comments: Oriented to self and place but not time.  She is baseline confused and speaks softly.   Psychiatric:         Mood and Affect: Mood normal.        All labs reviewed and interpreted by me:  Labs:   Recent Labs     07/11/24  0615 07/12/24  0611 07/13/24  0806   WBC 6.7 5.6 6.7   HGB 8.5* 8.5* 9.1*   HCT 28.0* 28.4* 29.9*   * 462* 486*     Recent Labs     07/11/24  0615 07/12/24  0611 07/13/24  0806    136 136   K 4.1 4.6 4.8    99 99   CO2 25 26 24   BUN 18 33* 30*   CREATININE 0.8 1.0 0.9   CALCIUM 8.7 8.9 9.3     No results for input(s): \"AST\", \"ALT\", \"BILIDIR\", \"BILITOT\", \"ALKPHOS\" in the last 72 hours.  No results for input(s): \"INR\" in the last 72 hours.  No results for input(s): \"CKTOTAL\", \"TROPONINT\" in the last 72 hours.    Urinalysis:      Lab Results   Component Value Date/Time    NITRU NEGATIVE 07/01/2024 03:17 PM    WBCUA 0-2 07/01/2024 03:17 PM    BACTERIA NONE SEEN 07/01/2024 03:17 PM    RBCUA 0-2 07/01/2024 03:17 PM    BLOODU NEGATIVE 07/01/2024 03:17 PM    GLUCOSEU NEGATIVE 07/01/2024 03:17 PM       All radiology images and reports reviewed and interpreted by me:  Radiology:  Vascular duplex lower extremity arteries bilateral   Final Result   Excellent pulsatility throughout both legs.            **This report has been created using voice recognition software.  It may contain   minor errors which are inherent in voice recognition technology.**

## 2024-07-14 LAB
ANION GAP SERPL CALC-SCNC: 10 MEQ/L (ref 8–16)
BASOPHILS ABSOLUTE: 0.1 THOU/MM3 (ref 0–0.1)
BASOPHILS NFR BLD AUTO: 1 %
BUN SERPL-MCNC: 36 MG/DL (ref 7–22)
CALCIUM SERPL-MCNC: 8.7 MG/DL (ref 8.5–10.5)
CHLORIDE SERPL-SCNC: 101 MEQ/L (ref 98–111)
CO2 SERPL-SCNC: 23 MEQ/L (ref 23–33)
CREAT SERPL-MCNC: 1 MG/DL (ref 0.4–1.2)
DEPRECATED RDW RBC AUTO: 48.2 FL (ref 35–45)
EOSINOPHIL NFR BLD AUTO: 4.9 %
EOSINOPHILS ABSOLUTE: 0.3 THOU/MM3 (ref 0–0.4)
ERYTHROCYTE [DISTWIDTH] IN BLOOD BY AUTOMATED COUNT: 15.7 % (ref 11.5–14.5)
GFR SERPL CREATININE-BSD FRML MDRD: 59 ML/MIN/1.73M2
GLUCOSE BLD STRIP.AUTO-MCNC: 216 MG/DL (ref 70–108)
GLUCOSE BLD STRIP.AUTO-MCNC: 222 MG/DL (ref 70–108)
GLUCOSE BLD STRIP.AUTO-MCNC: 243 MG/DL (ref 70–108)
GLUCOSE BLD STRIP.AUTO-MCNC: 282 MG/DL (ref 70–108)
GLUCOSE SERPL-MCNC: 233 MG/DL (ref 70–108)
HCT VFR BLD AUTO: 27.6 % (ref 37–47)
HGB BLD-MCNC: 8.5 GM/DL (ref 12–16)
IMM GRANULOCYTES # BLD AUTO: 0.05 THOU/MM3 (ref 0–0.07)
IMM GRANULOCYTES NFR BLD AUTO: 0.7 %
LYMPHOCYTES ABSOLUTE: 2.1 THOU/MM3 (ref 1–4.8)
LYMPHOCYTES NFR BLD AUTO: 31.6 %
MCH RBC QN AUTO: 25.9 PG (ref 26–33)
MCHC RBC AUTO-ENTMCNC: 30.8 GM/DL (ref 32.2–35.5)
MCV RBC AUTO: 84.1 FL (ref 81–99)
MONOCYTES ABSOLUTE: 0.7 THOU/MM3 (ref 0.4–1.3)
MONOCYTES NFR BLD AUTO: 10.5 %
NEUTROPHILS ABSOLUTE: 3.4 THOU/MM3 (ref 1.8–7.7)
NEUTROPHILS NFR BLD AUTO: 51.3 %
NRBC BLD AUTO-RTO: 0 /100 WBC
PLATELET # BLD AUTO: 437 THOU/MM3 (ref 130–400)
PMV BLD AUTO: 10.6 FL (ref 9.4–12.4)
POTASSIUM SERPL-SCNC: 4 MEQ/L (ref 3.5–5.2)
RBC # BLD AUTO: 3.28 MILL/MM3 (ref 4.2–5.4)
SODIUM SERPL-SCNC: 134 MEQ/L (ref 135–145)
WBC # BLD AUTO: 6.7 THOU/MM3 (ref 4.8–10.8)

## 2024-07-14 PROCEDURE — 99232 SBSQ HOSP IP/OBS MODERATE 35: CPT | Performed by: INTERNAL MEDICINE

## 2024-07-14 PROCEDURE — 36415 COLL VENOUS BLD VENIPUNCTURE: CPT

## 2024-07-14 PROCEDURE — 1200000000 HC SEMI PRIVATE

## 2024-07-14 PROCEDURE — 85025 COMPLETE CBC W/AUTO DIFF WBC: CPT

## 2024-07-14 PROCEDURE — 2580000003 HC RX 258: Performed by: INTERNAL MEDICINE

## 2024-07-14 PROCEDURE — 6370000000 HC RX 637 (ALT 250 FOR IP): Performed by: PHYSICIAN ASSISTANT

## 2024-07-14 PROCEDURE — 6360000002 HC RX W HCPCS: Performed by: INTERNAL MEDICINE

## 2024-07-14 PROCEDURE — 80048 BASIC METABOLIC PNL TOTAL CA: CPT

## 2024-07-14 PROCEDURE — 6370000000 HC RX 637 (ALT 250 FOR IP)

## 2024-07-14 PROCEDURE — 2580000003 HC RX 258

## 2024-07-14 PROCEDURE — 6360000002 HC RX W HCPCS

## 2024-07-14 PROCEDURE — 82948 REAGENT STRIP/BLOOD GLUCOSE: CPT

## 2024-07-14 RX ADMIN — INSULIN LISPRO 10 UNITS: 100 INJECTION, SOLUTION INTRAVENOUS; SUBCUTANEOUS at 17:08

## 2024-07-14 RX ADMIN — ESCITALOPRAM OXALATE 10 MG: 10 TABLET, FILM COATED ORAL at 08:59

## 2024-07-14 RX ADMIN — INSULIN LISPRO 2 UNITS: 100 INJECTION, SOLUTION INTRAVENOUS; SUBCUTANEOUS at 11:48

## 2024-07-14 RX ADMIN — INSULIN LISPRO 10 UNITS: 100 INJECTION, SOLUTION INTRAVENOUS; SUBCUTANEOUS at 08:59

## 2024-07-14 RX ADMIN — DICLOFENAC SODIUM 4 G: 10 GEL TOPICAL at 23:41

## 2024-07-14 RX ADMIN — INSULIN LISPRO 2 UNITS: 100 INJECTION, SOLUTION INTRAVENOUS; SUBCUTANEOUS at 17:08

## 2024-07-14 RX ADMIN — AMLODIPINE BESYLATE 2.5 MG: 2.5 TABLET ORAL at 08:59

## 2024-07-14 RX ADMIN — ACETAMINOPHEN 650 MG: 325 TABLET ORAL at 19:40

## 2024-07-14 RX ADMIN — ENOXAPARIN SODIUM 40 MG: 100 INJECTION SUBCUTANEOUS at 08:59

## 2024-07-14 RX ADMIN — INSULIN LISPRO 2 UNITS: 100 INJECTION, SOLUTION INTRAVENOUS; SUBCUTANEOUS at 08:59

## 2024-07-14 RX ADMIN — MEROPENEM 1000 MG: 1 INJECTION INTRAVENOUS at 05:32

## 2024-07-14 RX ADMIN — ACETAMINOPHEN 650 MG: 325 TABLET ORAL at 13:51

## 2024-07-14 RX ADMIN — INSULIN LISPRO 10 UNITS: 100 INJECTION, SOLUTION INTRAVENOUS; SUBCUTANEOUS at 11:47

## 2024-07-14 RX ADMIN — SODIUM CHLORIDE, PRESERVATIVE FREE 10 ML: 5 INJECTION INTRAVENOUS at 08:58

## 2024-07-14 RX ADMIN — MEROPENEM 1000 MG: 1 INJECTION INTRAVENOUS at 17:37

## 2024-07-14 RX ADMIN — INSULIN GLARGINE 15 UNITS: 100 INJECTION, SOLUTION SUBCUTANEOUS at 19:45

## 2024-07-14 RX ADMIN — LISINOPRIL 40 MG: 40 TABLET ORAL at 08:59

## 2024-07-14 ASSESSMENT — PAIN - FUNCTIONAL ASSESSMENT
PAIN_FUNCTIONAL_ASSESSMENT: PREVENTS OR INTERFERES SOME ACTIVE ACTIVITIES AND ADLS
PAIN_FUNCTIONAL_ASSESSMENT: ACTIVITIES ARE NOT PREVENTED
PAIN_FUNCTIONAL_ASSESSMENT: ACTIVITIES ARE NOT PREVENTED

## 2024-07-14 ASSESSMENT — PAIN DESCRIPTION - LOCATION
LOCATION: KNEE
LOCATION: FOOT
LOCATION: FOOT

## 2024-07-14 ASSESSMENT — PAIN DESCRIPTION - DESCRIPTORS
DESCRIPTORS: ACHING
DESCRIPTORS: SORE
DESCRIPTORS: ACHING

## 2024-07-14 ASSESSMENT — PAIN SCALES - GENERAL
PAINLEVEL_OUTOF10: 3
PAINLEVEL_OUTOF10: 5
PAINLEVEL_OUTOF10: 5
PAINLEVEL_OUTOF10: 3

## 2024-07-14 ASSESSMENT — PAIN DESCRIPTION - ORIENTATION
ORIENTATION: RIGHT

## 2024-07-14 NOTE — PROGRESS NOTES
PROGRESS NOTE      Patient:  Niya Blankenship  Unit/Bed:7K-12/012-A  YOB: 1950  MRN: [id number]   Acct: [id number]    PCP: Taylor Maza APRN - NP    Date of Admission: 7/1/2024 LOS: 13    Date of Evaluation:  7/14/2024    Anticipated Discharge: pending placement     Assessment/Plan:    Osteomyelitis of right foot with leukocytosis  S/P excisional debridement of right heel and partial right calcanectomy on 7/5  MRI right foot 7/2: Soft tissue defect in the plantar soft tissues over the calcaneus. Abnormal signal intensity in the calcaneus consistent with involvement by osteomyelitis. Abnormal signal intensity in the plantar soft tissues over the medial aspect   of the calcaneus consistent with inflammatory process. There was air in the soft tissues seen on the plain radiographs. Degenerative changes. Osteochondral defect in the talar dome. Spurring at the attachment of the Achilles tendon upon the calcaneus. Soft tissue swelling over the foot.   X-ray right foot 7/1: No fracture or dislocation   Podiatry following, continue to appreciate recommendations.   Taken to the OR on 7/5 for right heel partial calcanectomy and debridement of wound.  Patient tolerated procedure well. Continue wound vac. Pt and family agreeable to SNF.   Continue daily dressing   MRSA negative   CRP 15.80 on 7/2  ID following, appreciate recommendations  S/p vancomycin (7/1-7/3)  s/p Zosyn (7/1-7/5). Pt initially wanting to transition to po Abx, but as pt is now amenable to SNF at [place] may consider contining IV Abx at [facility] vs oral Abx. Will follow up ID for recs.    blood culture x 2 NG 5D  Heel culture grew Pseudomonas aeruginosa   Bone and foot cx: probable ESBL producing Klebsiella, Strep agalactiae and Enterococcus avium   Started on meropenem per ID (7/8-  Leukocytosis improving.  White blood cell count as of 7/14 is 6.7  Of note, Per chart review, Patient underwent bedside debridement on 03/27 then on 03/29 she underwent  mg twice daily, Amaryl, will hold while IP     Acute on chronic metabolic encephalopathy, stable  Likely secondary to current infection vs undiagnosed dementia   UA 7/1 unremarkable  Continue to monitor      Hypertension  Continue home lisinopril; 20 mg increased to 40mg on 7/5, will continue at this dose.  Low dose of Norvasc added on 7/8 with parameters given her elevated pressures despite above change.      Thrombocytosis, possibly secondary to infection, improving  Platelets 494 on admission  7/14 platelets are 437  Continue to monitor.     Hyponatremia, improving  Na 131 on admission corrected to 134 likely secondary to hyperglycemia  Sodium 131 on 7/6, believe likely 2/2 hypovolemia in setting of poor oral intake  As of 7/14, Na at 134.   Will continue to follow with daily BMP     Hypokalemia, improving  K 3.4 on admission  7/2: K 3.1, received 40 mEq po  7/3: K 3.3, received 40 mEq po  7/5 K 3.4, received 40 mEq po  as of 7/14, potassium 4.0  Potassium protocols in place  Continue to replete as needed     Depression/anxiety  Stable on home meds     Deconditioning   Continue PT/OT   on 7/10 CM spoke with pt and family and they would not like home with .   on 7/11, Family decided that they would like SNF [facility] and will talk to their community about financial plan. - initially plan was for SNF as pt is NWB on right foot, along with need of wound vac and IV Abx . Pt is requesting [facility] snf in [place].  Pt declined from [facility] . Family is interested in a possible ANF in [facility], but also mentioning interest in home with  and/or home with hospice  Pending disposition for discharge  Will follow up with CM and palliative for recs            Chief Complaint: Diabetic R heel wound, confusion     Hospital Course: Per HPI, \"74 y.o. female who presented to [facility] with Pmhx of non-insulin dependent DM2, diabetic peripheral neuropathy and HTN who presented to the ED with

## 2024-07-14 NOTE — PLAN OF CARE
Problem: Discharge Planning  Goal: Discharge to home or other facility with appropriate resources  7/14/2024 0952 by Daniel Jasso RN  Outcome: Progressing  Flowsheets (Taken 7/14/2024 0952)  Discharge to home or other facility with appropriate resources:   Identify barriers to discharge with patient and caregiver   Identify discharge learning needs (meds, wound care, etc)   Refer to discharge planning if patient needs post-hospital services based on physician order or complex needs related to functional status, cognitive ability or social support system   Arrange for needed discharge resources and transportation as appropriate  7/14/2024 0156 by Lisa Blake RN  Outcome: Progressing  Flowsheets (Taken 7/14/2024 0156)  Discharge to home or other facility with appropriate resources:   Identify barriers to discharge with patient and caregiver   Arrange for needed discharge resources and transportation as appropriate   Identify discharge learning needs (meds, wound care, etc)  7/14/2024 0154 by Lisa Blake RN  Outcome: Not Progressing     Problem: Safety - Adult  Goal: Free from fall injury  7/14/2024 0952 by aDniel Jasso RN  Outcome: Progressing  Flowsheets (Taken 7/14/2024 0952)  Free From Fall Injury: Instruct family/caregiver on patient safety  7/14/2024 0156 by Lisa Blake RN  Outcome: Progressing  Flowsheets (Taken 7/14/2024 0156)  Free From Fall Injury: Instruct family/caregiver on patient safety  7/14/2024 0154 by Lisa Blake RN  Outcome: Not Progressing     Problem: Skin/Tissue Integrity  Goal: Absence of new skin breakdown  Description: 1.  Monitor for areas of redness and/or skin breakdown  2.  Assess vascular access sites hourly  3.  Every 4-6 hours minimum:  Change oxygen saturation probe site  4.  Every 4-6 hours:  If on nasal continuous positive airway pressure, respiratory therapy assess nares and determine need for appliance change or resting period.  7/14/2024 0952 by Romero  infection  7/14/2024 0952 by Daniel Jasso RN  Outcome: Progressing  Flowsheets (Taken 7/14/2024 0952)  Incisions, Wounds, or Drain Sites Healing Without Sign and Symptoms of Infection:   ADMISSION and DAILY: Assess and document risk factors for pressure ulcer development   TWICE DAILY: Assess and document skin integrity   TWICE DAILY: Assess and document dressing/incision, wound bed, drain sites and surrounding tissue  7/14/2024 0156 by Lisa Blake RN  Outcome: Progressing  Flowsheets (Taken 7/14/2024 0156)  Incisions, Wounds, or Drain Sites Healing Without Sign and Symptoms of Infection: TWICE DAILY: Assess and document skin integrity  7/14/2024 0154 by Lisa Blake RN  Outcome: Not Progressing     Problem: Chronic Conditions and Co-morbidities  Goal: Patient's chronic conditions and co-morbidity symptoms are monitored and maintained or improved  7/14/2024 0952 by Daniel Jasso RN  Outcome: Progressing  Flowsheets (Taken 7/14/2024 0952)  Care Plan - Patient's Chronic Conditions and Co-Morbidity Symptoms are Monitored and Maintained or Improved:   Monitor and assess patient's chronic conditions and comorbid symptoms for stability, deterioration, or improvement   Collaborate with multidisciplinary team to address chronic and comorbid conditions and prevent exacerbation or deterioration   Update acute care plan with appropriate goals if chronic or comorbid symptoms are exacerbated and prevent overall improvement and discharge  7/14/2024 0156 by Lisa Blake RN  Outcome: Progressing  Flowsheets (Taken 7/14/2024 0156)  Care Plan - Patient's Chronic Conditions and Co-Morbidity Symptoms are Monitored and Maintained or Improved:   Monitor and assess patient's chronic conditions and comorbid symptoms for stability, deterioration, or improvement   Collaborate with multidisciplinary team to address chronic and comorbid conditions and prevent exacerbation or deterioration  7/14/2024 0154 by Lisa Blake,  RN  Outcome: Not Progressing     Problem: Nutrition Deficit:  Goal: Optimize nutritional status  7/14/2024 0952 by Daniel Jasso, RN  Outcome: Progressing  Flowsheets (Taken 7/14/2024 0952)  Nutrient intake appropriate for improving, restoring, or maintaining nutritional needs:   Assess nutritional status and recommend course of action   Monitor oral intake, labs, and treatment plans  7/14/2024 0156 by Lisa Blake, RN  Outcome: Progressing  Flowsheets (Taken 7/14/2024 0156)  Nutrient intake appropriate for improving, restoring, or maintaining nutritional needs:   Assess nutritional status and recommend course of action   Monitor oral intake, labs, and treatment plans  7/14/2024 0154 by Lisa Blake, RN  Outcome: Not Progressing

## 2024-07-14 NOTE — PLAN OF CARE
Problem: Discharge Planning  Goal: Discharge to home or other facility with appropriate resources  7/14/2024 0156 by Lisa Blake RN  Outcome: Progressing  Flowsheets (Taken 7/14/2024 0156)  Discharge to home or other facility with appropriate resources:   Identify barriers to discharge with patient and caregiver   Arrange for needed discharge resources and transportation as appropriate   Identify discharge learning needs (meds, wound care, etc)     Problem: Safety - Adult  Goal: Free from fall injury  7/14/2024 0156 by Lisa Blake, RN  Outcome: Progressing  Flowsheets (Taken 7/14/2024 0156)  Free From Fall Injury: Instruct family/caregiver on patient safety     Problem: Skin/Tissue Integrity  Goal: Absence of new skin breakdown  Description: 1.  Monitor for areas of redness and/or skin breakdown  2.  Assess vascular access sites hourly  3.  Every 4-6 hours minimum:  Change oxygen saturation probe site  4.  Every 4-6 hours:  If on nasal continuous positive airway pressure, respiratory therapy assess nares and determine need for appliance change or resting period.  7/14/2024 0156 by Lisa Blake RN  Outcome: Progressing     Problem: Pain  Goal: Verbalizes/displays adequate comfort level or baseline comfort level  7/14/2024 0156 by Lisa Blake RN  Outcome: Progressing  Flowsheets (Taken 7/14/2024 0156)  Verbalizes/displays adequate comfort level or baseline comfort level:   Encourage patient to monitor pain and request assistance   Assess pain using appropriate pain scale   Administer analgesics based on type and severity of pain and evaluate response   Implement non-pharmacological measures as appropriate and evaluate response     Problem: Skin/Tissue Integrity - Adult  Goal: Skin integrity remains intact  7/14/2024 0156 by Lisa Blake RN  Outcome: Progressing  Flowsheets (Taken 7/14/2024 0156)  Skin Integrity Remains Intact: Monitor for areas of redness and/or skin breakdown     Problem:  Skin/Tissue Integrity - Adult  Goal: Incisions, wounds, or drain sites healing without S/S of infection  7/14/2024 0156 by Lisa Blake RN  Outcome: Progressing  Flowsheets (Taken 7/14/2024 0156)  Incisions, Wounds, or Drain Sites Healing Without Sign and Symptoms of Infection: TWICE DAILY: Assess and document skin integrity     Problem: Chronic Conditions and Co-morbidities  Goal: Patient's chronic conditions and co-morbidity symptoms are monitored and maintained or improved  7/14/2024 0156 by Lisa Blake RN  Outcome: Progressing  Flowsheets (Taken 7/14/2024 0156)  Care Plan - Patient's Chronic Conditions and Co-Morbidity Symptoms are Monitored and Maintained or Improved:   Monitor and assess patient's chronic conditions and comorbid symptoms for stability, deterioration, or improvement   Collaborate with multidisciplinary team to address chronic and comorbid conditions and prevent exacerbation or deterioration     Problem: Nutrition Deficit:  Goal: Optimize nutritional status  7/14/2024 0156 by Lisa Blake RN  Outcome: Progressing  Flowsheets (Taken 7/14/2024 0156)  Nutrient intake appropriate for improving, restoring, or maintaining nutritional needs:   Assess nutritional status and recommend course of action   Monitor oral intake, labs, and treatment plans     Care plan reviewed with patient and family. Patient and family verbalize understanding of the plan of care and contribute to goal setting.

## 2024-07-15 LAB
ANION GAP SERPL CALC-SCNC: 12 MEQ/L (ref 8–16)
BASOPHILS ABSOLUTE: 0.1 THOU/MM3 (ref 0–0.1)
BASOPHILS NFR BLD AUTO: 0.9 %
BUN SERPL-MCNC: 30 MG/DL (ref 7–22)
CALCIUM SERPL-MCNC: 8.9 MG/DL (ref 8.5–10.5)
CHLORIDE SERPL-SCNC: 101 MEQ/L (ref 98–111)
CO2 SERPL-SCNC: 23 MEQ/L (ref 23–33)
CREAT SERPL-MCNC: 0.9 MG/DL (ref 0.4–1.2)
DEPRECATED RDW RBC AUTO: 46.8 FL (ref 35–45)
EOSINOPHIL NFR BLD AUTO: 5.5 %
EOSINOPHILS ABSOLUTE: 0.4 THOU/MM3 (ref 0–0.4)
ERYTHROCYTE [DISTWIDTH] IN BLOOD BY AUTOMATED COUNT: 15.4 % (ref 11.5–14.5)
GFR SERPL CREATININE-BSD FRML MDRD: 67 ML/MIN/1.73M2
GLUCOSE BLD STRIP.AUTO-MCNC: 178 MG/DL (ref 70–108)
GLUCOSE BLD STRIP.AUTO-MCNC: 199 MG/DL (ref 70–108)
GLUCOSE BLD STRIP.AUTO-MCNC: 211 MG/DL (ref 70–108)
GLUCOSE BLD STRIP.AUTO-MCNC: 313 MG/DL (ref 70–108)
GLUCOSE SERPL-MCNC: 176 MG/DL (ref 70–108)
HCT VFR BLD AUTO: 28.3 % (ref 37–47)
HGB BLD-MCNC: 8.8 GM/DL (ref 12–16)
IMM GRANULOCYTES # BLD AUTO: 0.05 THOU/MM3 (ref 0–0.07)
IMM GRANULOCYTES NFR BLD AUTO: 0.6 %
LYMPHOCYTES ABSOLUTE: 2 THOU/MM3 (ref 1–4.8)
LYMPHOCYTES NFR BLD AUTO: 25.2 %
MCH RBC QN AUTO: 26 PG (ref 26–33)
MCHC RBC AUTO-ENTMCNC: 31.1 GM/DL (ref 32.2–35.5)
MCV RBC AUTO: 83.5 FL (ref 81–99)
MONOCYTES ABSOLUTE: 0.8 THOU/MM3 (ref 0.4–1.3)
MONOCYTES NFR BLD AUTO: 10.2 %
NEUTROPHILS ABSOLUTE: 4.6 THOU/MM3 (ref 1.8–7.7)
NEUTROPHILS NFR BLD AUTO: 57.6 %
NRBC BLD AUTO-RTO: 0 /100 WBC
PLATELET # BLD AUTO: 428 THOU/MM3 (ref 130–400)
PMV BLD AUTO: 10.5 FL (ref 9.4–12.4)
POTASSIUM SERPL-SCNC: 4.5 MEQ/L (ref 3.5–5.2)
RBC # BLD AUTO: 3.39 MILL/MM3 (ref 4.2–5.4)
SODIUM SERPL-SCNC: 136 MEQ/L (ref 135–145)
WBC # BLD AUTO: 8 THOU/MM3 (ref 4.8–10.8)

## 2024-07-15 PROCEDURE — 6360000002 HC RX W HCPCS: Performed by: INTERNAL MEDICINE

## 2024-07-15 PROCEDURE — 6370000000 HC RX 637 (ALT 250 FOR IP)

## 2024-07-15 PROCEDURE — 2580000003 HC RX 258

## 2024-07-15 PROCEDURE — 82948 REAGENT STRIP/BLOOD GLUCOSE: CPT

## 2024-07-15 PROCEDURE — 6360000002 HC RX W HCPCS

## 2024-07-15 PROCEDURE — 85025 COMPLETE CBC W/AUTO DIFF WBC: CPT

## 2024-07-15 PROCEDURE — 80048 BASIC METABOLIC PNL TOTAL CA: CPT

## 2024-07-15 PROCEDURE — 36415 COLL VENOUS BLD VENIPUNCTURE: CPT

## 2024-07-15 PROCEDURE — 2580000003 HC RX 258: Performed by: INTERNAL MEDICINE

## 2024-07-15 PROCEDURE — 1200000000 HC SEMI PRIVATE

## 2024-07-15 PROCEDURE — 99233 SBSQ HOSP IP/OBS HIGH 50: CPT | Performed by: INTERNAL MEDICINE

## 2024-07-15 RX ADMIN — ACETAMINOPHEN 650 MG: 325 TABLET ORAL at 17:31

## 2024-07-15 RX ADMIN — INSULIN LISPRO 10 UNITS: 100 INJECTION, SOLUTION INTRAVENOUS; SUBCUTANEOUS at 12:26

## 2024-07-15 RX ADMIN — ENOXAPARIN SODIUM 40 MG: 100 INJECTION SUBCUTANEOUS at 08:39

## 2024-07-15 RX ADMIN — HYOSCYAMINE SULFATE: 16 SOLUTION at 18:33

## 2024-07-15 RX ADMIN — INSULIN LISPRO 10 UNITS: 100 INJECTION, SOLUTION INTRAVENOUS; SUBCUTANEOUS at 08:38

## 2024-07-15 RX ADMIN — INSULIN LISPRO 2 UNITS: 100 INJECTION, SOLUTION INTRAVENOUS; SUBCUTANEOUS at 17:30

## 2024-07-15 RX ADMIN — SODIUM CHLORIDE, PRESERVATIVE FREE 10 ML: 5 INJECTION INTRAVENOUS at 18:31

## 2024-07-15 RX ADMIN — INSULIN LISPRO 4 UNITS: 100 INJECTION, SOLUTION INTRAVENOUS; SUBCUTANEOUS at 20:37

## 2024-07-15 RX ADMIN — MEROPENEM 1000 MG: 1 INJECTION INTRAVENOUS at 06:07

## 2024-07-15 RX ADMIN — ACETAMINOPHEN 650 MG: 325 TABLET ORAL at 08:38

## 2024-07-15 RX ADMIN — MEROPENEM 1000 MG: 1 INJECTION INTRAVENOUS at 18:29

## 2024-07-15 RX ADMIN — ESCITALOPRAM OXALATE 10 MG: 10 TABLET, FILM COATED ORAL at 08:40

## 2024-07-15 RX ADMIN — INSULIN GLARGINE 15 UNITS: 100 INJECTION, SOLUTION SUBCUTANEOUS at 20:36

## 2024-07-15 RX ADMIN — INSULIN LISPRO 10 UNITS: 100 INJECTION, SOLUTION INTRAVENOUS; SUBCUTANEOUS at 17:31

## 2024-07-15 ASSESSMENT — PAIN SCALES - GENERAL: PAINLEVEL_OUTOF10: 0

## 2024-07-15 NOTE — PALLIATIVE CARE
Initial Evaluation        Patient:   Niya Blankenship  YOB: 1950  Age:  74 y.o.  Room:  [facility]/012-  MRN:  [id number]   Acct: [id number]    Date of Admission:  7/1/2024  1:19 PM  Date of Service:  7/15/2024  Completed By:  Johnson Interiano RN        Reason for Palliative Care Evaluation:-   Goals of Care     Current Concerns   Patient denies symptoms at this time     Palliative Performance Scale   50%  Mainly sit/lie; Extensive disease; Can't do any work; Considerable assist; intake normal or reduced; LOC full/confusion     History    Patient admitted 7/1 from home with diabetic foot ulcer to the right foot. Patient with past medical history of DM, HTN.      Goals of Care Discussions and Plan         Family/Patient Discussion:- Met with patient and daughter-in-law at bedside. Introduced palliative care and role on the team. Inquired if patient had any questions about hospice care. Patient deferred to her daughter-in-law. They both would like this RN to come back when Nilay, patient son, returns to the room.     Plan/Follow-Up:-   Will attempt to meet with son and patient later today.      Code Status:DNR-CCA No additional code details    ACP documents on file:  -None    Healthcare Power of /Healthcare Surrogate Decision Makers:  Patient has spouse.             Electronically signed by Johnson Interiano RN on 7/15/2024 at 1:01 PM           Palliative Care Office: [phone]

## 2024-07-15 NOTE — CARE COORDINATION
7/15/24, 9:31 AM EDT    DISCHARGE PLANNING EVALUATION    spoke with patient and family members.   They confirm they are planning home with hospice, prefer [facility] or hospice in [place], including [facility].  Family has discussed need to provide care at home and will have someone with Niya at all times.       They would like ambulette transport home at discharge.        Hospice consult anticipated today.

## 2024-07-15 NOTE — PROGRESS NOTES
Progress note: Infectious diseases    Patient - Niya Blankenship,  Age - 74 y.o.    - 1950      Room Number - 7K-12/012-A   MRN -  [id number]   [facility] # - [id number]  Date of Admission -  2024  1:19 PM    SUBJECTIVE:   She wants to go home on hospice.  OBJECTIVE   VITALS    height is 1.626 m (5' 4\") and weight is 59 kg (130 lb). Her oral temperature is 98.6 °F (37 °C). Her blood pressure is 106/49 (abnormal) and her pulse is 90. Her respiration is 18 and oxygen saturation is 97%.       Wt Readings from Last 3 Encounters:   24 59 kg (130 lb)   21 70.3 kg (155 lb)       I/O (24 Hours)    Intake/Output Summary (Last 24 hours) at 7/15/2024 0911  Last data filed at 7/15/2024 0627  Gross per 24 hour   Intake 550 ml   Output --   Net 550 ml         General Appearance  Awake, alert, oriented,  not  In acute distress  HEENT - normocephalic, atraumatic, pale  conjunctiva,  anicteric sclera  Neck - Supple, no mass  Lungs -  Bilateral  air entry, no rhonchi, no wheeze  Cardiovascular - Heart sounds are normal.    Abdomen - soft, not distended, nontender,   Neurologic -oriented  Skin - No bruising or bleeding  Extremities -  wound vac to the right foot wound     MEDICATIONS:      meropenem  1,000 mg IntraVENous Q12H    insulin glargine  15 Units SubCUTAneous Nightly    insulin lispro  10 Units SubCUTAneous TID WC    amLODIPine  2.5 mg Oral Daily    insulin lispro  0-8 Units SubCUTAneous TID WC    insulin lispro  0-4 Units SubCUTAneous Nightly    lisinopril  40 mg Oral Daily    potassium chloride  40 mEq Oral Once    sodium chloride flush  5-40 mL IntraVENous 2 times per day    enoxaparin  40 mg SubCUTAneous Daily    escitalopram  10 mg Oral Daily      sodium chloride      dextrose       diclofenac sodium, hydrocortisone, hydrOXYzine pamoate, hydrALAZINE, sodium chloride flush, sodium chloride, potassium chloride **OR**  vac.  Diabetes mellitus with neuropathy  On iv meropenem  She wants to go home on hospice: discussed with /  Ministerio Cade MD, 7/15/2024 9:11 AM    PRINCIPAL DISCHARGE DIAGNOSIS  Diagnosis: Seizure  Assessment and Plan of Treatment: You had your seizure medications adjusted. Please take them as directed. Follow up with outpatient neurology      SECONDARY DISCHARGE DIAGNOSES  Diagnosis: ESRD on dialysis  Assessment and Plan of Treatment: Please report to usual HD time & follow up with your nephrologist.    Diagnosis: History of ITP  Assessment and Plan of Treatment: No changes were made to your home medications, please take them as directed by hematologist and follow up with her.

## 2024-07-15 NOTE — CARE COORDINATION
7/15/24, 1:31 PM EDT    DISCHARGE PLANNING EVALUATION    [facility]  [facility] information sent for review.  [facility] will consider for hospice admission

## 2024-07-15 NOTE — PLAN OF CARE
Problem: Discharge Planning  Goal: Discharge to home or other facility with appropriate resources  Outcome: Progressing  Flowsheets (Taken 7/15/2024 1454)  Discharge to home or other facility with appropriate resources:   Identify barriers to discharge with patient and caregiver   Arrange for needed discharge resources and transportation as appropriate   Identify discharge learning needs (meds, wound care, etc)     Problem: Safety - Adult  Goal: Free from fall injury  Outcome: Progressing  Flowsheets (Taken 7/15/2024 1454)  Free From Fall Injury: Instruct family/caregiver on patient safety     Problem: Skin/Tissue Integrity  Goal: Absence of new skin breakdown  Description: 1.  Monitor for areas of redness and/or skin breakdown  2.  Assess vascular access sites hourly  3.  Every 4-6 hours minimum:  Change oxygen saturation probe site  4.  Every 4-6 hours:  If on nasal continuous positive airway pressure, respiratory therapy assess nares and determine need for appliance change or resting period.  Outcome: Progressing  Note: No new skin breakdown noted at this time this shift.     Problem: Pain  Goal: Verbalizes/displays adequate comfort level or baseline comfort level  Outcome: Progressing  Flowsheets (Taken 7/15/2024 1454)  Verbalizes/displays adequate comfort level or baseline comfort level:   Encourage patient to monitor pain and request assistance   Assess pain using appropriate pain scale   Administer analgesics based on type and severity of pain and evaluate response   Implement non-pharmacological measures as appropriate and evaluate response     Problem: Skin/Tissue Integrity - Adult  Goal: Skin integrity remains intact  Outcome: Progressing  Flowsheets (Taken 7/15/2024 1454)  Skin Integrity Remains Intact: Monitor for areas of redness and/or skin breakdown     Problem: Skin/Tissue Integrity - Adult  Goal: Incisions, wounds, or drain sites healing without S/S of infection  Outcome: Progressing  Flowsheets

## 2024-07-15 NOTE — PALLIATIVE CARE
Follow Up / Progress Note        Patient:   Niya Blankenship  YOB: 1950  Age:  74 y.o.  Room:  [facility]12/012-A  MRN:  [id number]         Family/Patient Discussion:  Met with son Nilay per patient request. Explained palliative care and role on medical team. Explained hospice and what the transition means as it relates to goals for care. Shared with Nilay that our focus becomes on keeping the patient comfortable and not completing further medical interventions. Explained hospice treats symptoms rather than numbers and lab results which means no further testing. Explained the family would take care of her needs 24/7 and hospice would be available for needs by phone or other issues she was having. Explained all meds are switched to oral and she will no longer have an IV. Nilay inquired about her staying here. This RN shared she would not qualify for her to stay inpatient with hospice due to not having symptoms at this time and we will need to discharge her. Nilay appreciated the conversation and stated it was different than he expected. Nilay wanted this RN to talk with Johan who is the patient's POA. Nilay is going to have Johan call this RN when he has a phone available (probably tomorrow). Nilay struggling with the decision, offered emotional support. Reinforced that this is Niya's decision as she can speak for herself and we are here to help support that decision.       Plan/Follow-Up:  Palliative care will await follow up from Johan.  made referral to outside hospice today. Please call for additional needs.         Electronically signed by Johnson Interiano RN on 7/15/2024 at 1:52 PM             Palliative Care Office: [phone]

## 2024-07-15 NOTE — PROGRESS NOTES
Podiatric Progress Note  Niya Blankenship  Subjective :   7/15  Patient seen at bedside today on behalf of Dr. Phelan.  Patient is 9 days s/p partial calcanectomy and debridement placement of wound VAC on the right.  Patient sleeping on exam but easily arousable.  Family at bedside.  Disposition and placement process continues with SNF versus home health versus hospice.  Patient has some mild pain to the right heel with  palpation.  Denies any systemic symptoms.    7/12  Patient seen bedside today on behalf of Dr Phelan. Patient was alert and oriented to person, place, time, and event. 7 days s/p partial calcanectomy and debridement with placement of wound VAC. Patient had family at bedside. Patient denies any pain to her foot. Denies any new symptoms. No other pedal concerns.    7/10/24  Patient was seen bedside today on behalf of Dr. Phelan. Patient was alert and oriented to person, place, time, and event. Resident with the primary team was present in room. Patient is 5 days s/p partial calcanectomy and debridement with placement of wound VAC. Patient had family at bedside. Patient denies any pain to her foot. Relates that she feels great with no constitutional symptoms. Patient states she did not eat dinner last night because she does not like this food. No other pedal complaints at this time.    7/9   Patient seen bedside today alongside Dr. Phelan patient was alert oriented to person place time and event. Patient is status postop 4 days partial calcanectomy and debridement with placement of wound VAC.  Patient had family at bedside. Patient denies any pain to her foot patient said she feels great with no constitutional symptoms. Patient expressed her desire to be placed in a skilled nursing facility of her choice.  No other pedal complaints at this time.    7/8   Patient seen bedside today on behalf of Dr. Phelan.  Patient alert and oriented to person, place, time and event.  Patient is s/p 3 days partial calcanectomy and  PRN  potassium chloride (KLOR-CON M) extended release tablet 40 mEq, 40 mEq, Oral, PRN **OR** potassium bicarb-citric acid (EFFER-K) effervescent tablet 40 mEq, 40 mEq, Oral, PRN **OR** potassium chloride 10 mEq/100 mL IVPB (Peripheral Line), 10 mEq, IntraVENous, PRN  enoxaparin (LOVENOX) injection 40 mg, 40 mg, SubCUTAneous, Daily  ondansetron (ZOFRAN-ODT) disintegrating tablet 4 mg, 4 mg, Oral, Q8H PRN **OR** ondansetron (ZOFRAN) injection 4 mg, 4 mg, IntraVENous, Q6H PRN  polyethylene glycol (GLYCOLAX) packet 17 g, 17 g, Oral, Daily PRN  acetaminophen (TYLENOL) tablet 650 mg, 650 mg, Oral, Q6H PRN **OR** acetaminophen (TYLENOL) suppository 650 mg, 650 mg, Rectal, Q6H PRN  escitalopram (LEXAPRO) tablet 10 mg, 10 mg, Oral, Daily  zinc oxide (PINXAV) 30 % ointment, , Topical, 4x Daily PRN  glucose chewable tablet 16 g, 4 tablet, Oral, PRN  dextrose bolus 10% 125 mL, 125 mL, IntraVENous, PRN **OR** dextrose bolus 10% 250 mL, 250 mL, IntraVENous, PRN  glucagon (rDNA) injection 1 mg, 1 mg, SubCUTAneous, PRN  dextrose 10 % infusion, , IntraVENous, Continuous PRN    Objective     BP (!) 156/82   Pulse 91   Temp 98.2 °F (36.8 °C) (Oral)   Resp 17   Ht 1.626 m (5' 4\")   Wt 59 kg (130 lb)   SpO2 96%   BMI 22.31 kg/m²      I/O:  Intake/Output Summary (Last 24 hours) at 7/15/2024 0641  Last data filed at 7/15/2024 0627  Gross per 24 hour   Intake 550 ml   Output --   Net 550 ml              Wt Readings from Last 3 Encounters:   07/01/24 59 kg (130 lb)   04/01/21 70.3 kg (155 lb)       LABS:    Recent Labs     07/14/24  0732 07/15/24  0401   WBC 6.7 8.0   HGB 8.5* 8.8*   HCT 27.6* 28.3*   * 428*        Recent Labs     07/15/24  0401      K 4.5      CO2 23   BUN 30*   CREATININE 0.9      No results for input(s): \"INR\", \"APTT\" in the last 72 hours.    Invalid input(s): \"PROT\"   No results for input(s): \"CKTOTAL\", \"CKMB\", \"CKMBINDEX\", \"TROPONINI\" in the last 72 hours.      Exam:     Vascular: Dorsalis

## 2024-07-15 NOTE — PLAN OF CARE
Problem: Discharge Planning  Goal: Discharge to home or other facility with appropriate resources  7/14/2024 2208 by Lisa Blake RN  Outcome: Progressing  Flowsheets (Taken 7/14/2024 2208)  Discharge to home or other facility with appropriate resources:   Identify barriers to discharge with patient and caregiver   Arrange for needed discharge resources and transportation as appropriate   Identify discharge learning needs (meds, wound care, etc)     Problem: Safety - Adult  Goal: Free from fall injury  7/14/2024 2208 by Lisa Blake, RN  Outcome: Progressing  Flowsheets (Taken 7/14/2024 2208)  Free From Fall Injury: Instruct family/caregiver on patient safety     Problem: Skin/Tissue Integrity  Goal: Absence of new skin breakdown  Description: 1.  Monitor for areas of redness and/or skin breakdown  2.  Assess vascular access sites hourly  3.  Every 4-6 hours minimum:  Change oxygen saturation probe site  4.  Every 4-6 hours:  If on nasal continuous positive airway pressure, respiratory therapy assess nares and determine need for appliance change or resting period.  7/14/2024 2208 by Lisa Blake, RN  Outcome: Progressing     Problem: Pain  Goal: Verbalizes/displays adequate comfort level or baseline comfort level  7/14/2024 2208 by Lisa Blake RN  Outcome: Progressing  Flowsheets (Taken 7/14/2024 2208)  Verbalizes/displays adequate comfort level or baseline comfort level:   Encourage patient to monitor pain and request assistance   Assess pain using appropriate pain scale   Administer analgesics based on type and severity of pain and evaluate response   Implement non-pharmacological measures as appropriate and evaluate response     Problem: Skin/Tissue Integrity - Adult  Goal: Skin integrity remains intact  7/14/2024 2208 by Lisa Blake, RN  Outcome: Progressing  Flowsheets (Taken 7/14/2024 2208)  Skin Integrity Remains Intact: Monitor for areas of redness and/or skin breakdown     Problem:

## 2024-07-15 NOTE — PROGRESS NOTES
PROGRESS NOTE      Patient:  Niya Blankenship  Unit/Bed:7K-12/012-A  YOB: 1950  MRN: [id number]   Acct: [id number]    PCP: Taylor Maza APRN - NP    Date of Admission: 7/1/2024 LOS: 14    Date of Evaluation:  7/15/2024    Anticipated Discharge: pending dispo, likely tomorrow 7/16    Assessment/Plan:    Osteomyelitis of right foot with leukocytosis  S/P excisional debridement of right heel and partial right calcanectomy on 7/5  MRI right foot 7/2: Soft tissue defect in the plantar soft tissues over the calcaneus. Abnormal signal intensity in the calcaneus consistent with involvement by osteomyelitis. Abnormal signal intensity in the plantar soft tissues over the medial aspect of the calcaneus consistent with inflammatory process. There was air in the soft tissues seen on the plain radiographs. Degenerative changes. Osteochondral defect in the talar dome. Spurring at the attachment of the Achilles tendon upon the calcaneus. Soft tissue swelling over the foot.   X-ray right foot 7/1: No fracture or dislocation   Podiatry following, continue to appreciate recommendations.   Taken to the OR on 7/5 for right heel partial calcanectomy and debridement of wound.  Patient tolerated procedure well. Continue wound vac. Pt and family agreeable to SNF.   Continue daily dressing   MRSA negative   CRP 15.80 on 7/2  ID following, appreciate recommendations  S/p vancomycin (7/1-7/3)  s/p Zosyn (7/1-7/5). Pt initially wanting to transition to po Abx, but as pt is now amenable to SNF at [place] may consider contining IV Abx at [place] vs oral Abx. Will follow up ID for recs.    blood culture x 2 NG 5D  Heel culture grew Pseudomonas aeruginosa   Bone and foot cx: probable ESBL producing Klebsiella, Strep agalactiae and Enterococcus avium   Started on meropenem per ID (7/8-  Leukocytosis improving.  White blood cell count as of 7/15 8.0  Of note, Per chart review, Patient underwent bedside debridement on 03/27 then on 03/29 she  worsening R heel ulcer.  Her symptoms began in January 2025 which were managed outpatient with various at home remedies including foot baths and home remedy oil with minimal improvement.  Her ulcer gradually grew from a small spot to overtaking her heel.  During March 2024, she was hospitalized and underwent a R heel debridement. She began seeing Dr. Phelan in [place] for wound care and had been scheduled for a staged calcaneus amputation for 8/9/24.  Today she presents with a right heel decubitus foot ulcer without sensations of pain or tingling. She had a low-grade fever of 99.5F and notes \"darkening\" of the foot with a foul smell. She has some baseline confusion, but her daughter notes worsening confusion and agitation in the last few days. The pt had been on Cipro and Keflex PO outpatient, with the most recent dose 7/1 in the morning. Her daughter was present on 7/1 to assist with history.     Subjective/HPI:   Niya Blankenship feels well overall. She denies HA, SOB, CP, N/V/D. Pt was seen and evaluated with family at bedside. Family has decided hospice at this time. All questions answered.       PMH, SURGICAL HX, FH, SOCIAL HX reviewed and updated as needed.    Medications:  Reviewed    Infusion Medications    sodium chloride      dextrose       Scheduled Medications    meropenem  1,000 mg IntraVENous Q12H    insulin glargine  15 Units SubCUTAneous Nightly    insulin lispro  10 Units SubCUTAneous TID WC    amLODIPine  2.5 mg Oral Daily    insulin lispro  0-8 Units SubCUTAneous TID WC    insulin lispro  0-4 Units SubCUTAneous Nightly    lisinopril  40 mg Oral Daily    potassium chloride  40 mEq Oral Once    sodium chloride flush  5-40 mL IntraVENous 2 times per day    enoxaparin  40 mg SubCUTAneous Daily    escitalopram  10 mg Oral Daily     PRN Meds: diclofenac sodium, hydrocortisone, hydrOXYzine pamoate, hydrALAZINE, sodium chloride flush, sodium chloride, potassium chloride **OR** potassium alternative oral  continue    Steroids: no    Telemetry: []Yes / [x]No  Telemetry Review of past 24 hours: Not Applicable    LDA: []CVC / []PICC / [x]Midline / []Sears / []Drains / []Mediport / []PIV / []None    Labs (still needed?): [x]Yes / []No  IVF (still needed?): []Yes / [x]No    Level of care: []Step Down / [x]Med-Surg  Bed Status: [x]Inpatient / []Observation    DVT Prophylaxis: [x] Lovenox / [] Heparin / [] SCDs / [] Already on Systemic Anticoagulation / [] None     PT/OT: [x]Yes / []No    Disposition:    [] Home       [] TCU       [] Rehab       [] Psych       [] SNF       [] Long Term Care Facility       [x] Other-home with hospice     Code Status: DNR-CCA      An electronic signature was used to authenticate this note  - Josefina Loya MD PGY-2 on 7/15/2024 at 11:23 AM

## 2024-07-15 NOTE — PROGRESS NOTES
[facility]ound Ostomy Continence Nurse  Progress Note       Niya Blankenship  AGE: 74 y.o.   GENDER: female  : 1950  UNIT: 7K-12/012-A  TODAY'S DATE:  7/15/2024  ADMISSION DATE: 2024  1:19 PM    Summary:     Wound ostomy following for wound vac dressing changes to right foot post-surgical wound. Patient elected to transition to hospice care. Contacted podiatry resident to determine treatment plan moving forward. Reviewed with Dr Phelan. Plan for Dakins 0.25% daily dressing changes to right heel wound. Order placed as received from Dr Campos, DPCHAIM. Will follow as needed.

## 2024-07-16 LAB
ANION GAP SERPL CALC-SCNC: 12 MEQ/L (ref 8–16)
BASOPHILS ABSOLUTE: 0.1 THOU/MM3 (ref 0–0.1)
BASOPHILS NFR BLD AUTO: 0.9 %
BUN SERPL-MCNC: 26 MG/DL (ref 7–22)
CALCIUM SERPL-MCNC: 8.8 MG/DL (ref 8.5–10.5)
CHLORIDE SERPL-SCNC: 103 MEQ/L (ref 98–111)
CO2 SERPL-SCNC: 22 MEQ/L (ref 23–33)
CREAT SERPL-MCNC: 0.8 MG/DL (ref 0.4–1.2)
DEPRECATED RDW RBC AUTO: 46.7 FL (ref 35–45)
EOSINOPHIL NFR BLD AUTO: 6 %
EOSINOPHILS ABSOLUTE: 0.4 THOU/MM3 (ref 0–0.4)
ERYTHROCYTE [DISTWIDTH] IN BLOOD BY AUTOMATED COUNT: 15.4 % (ref 11.5–14.5)
GFR SERPL CREATININE-BSD FRML MDRD: 77 ML/MIN/1.73M2
GLUCOSE BLD STRIP.AUTO-MCNC: 220 MG/DL (ref 70–108)
GLUCOSE BLD STRIP.AUTO-MCNC: 222 MG/DL (ref 70–108)
GLUCOSE BLD STRIP.AUTO-MCNC: 298 MG/DL (ref 70–108)
GLUCOSE BLD STRIP.AUTO-MCNC: 317 MG/DL (ref 70–108)
GLUCOSE SERPL-MCNC: 235 MG/DL (ref 70–108)
HCT VFR BLD AUTO: 28.8 % (ref 37–47)
HGB BLD-MCNC: 8.9 GM/DL (ref 12–16)
IMM GRANULOCYTES # BLD AUTO: 0.04 THOU/MM3 (ref 0–0.07)
IMM GRANULOCYTES NFR BLD AUTO: 0.6 %
LYMPHOCYTES ABSOLUTE: 1.8 THOU/MM3 (ref 1–4.8)
LYMPHOCYTES NFR BLD AUTO: 26.4 %
MCH RBC QN AUTO: 25.8 PG (ref 26–33)
MCHC RBC AUTO-ENTMCNC: 30.9 GM/DL (ref 32.2–35.5)
MCV RBC AUTO: 83.5 FL (ref 81–99)
MONOCYTES ABSOLUTE: 0.7 THOU/MM3 (ref 0.4–1.3)
MONOCYTES NFR BLD AUTO: 10.3 %
NEUTROPHILS ABSOLUTE: 3.7 THOU/MM3 (ref 1.8–7.7)
NEUTROPHILS NFR BLD AUTO: 55.8 %
NRBC BLD AUTO-RTO: 0 /100 WBC
PLATELET # BLD AUTO: 400 THOU/MM3 (ref 130–400)
PMV BLD AUTO: 10.6 FL (ref 9.4–12.4)
POTASSIUM SERPL-SCNC: 4.1 MEQ/L (ref 3.5–5.2)
RBC # BLD AUTO: 3.45 MILL/MM3 (ref 4.2–5.4)
SODIUM SERPL-SCNC: 137 MEQ/L (ref 135–145)
WBC # BLD AUTO: 6.7 THOU/MM3 (ref 4.8–10.8)

## 2024-07-16 PROCEDURE — 6370000000 HC RX 637 (ALT 250 FOR IP)

## 2024-07-16 PROCEDURE — 36415 COLL VENOUS BLD VENIPUNCTURE: CPT

## 2024-07-16 PROCEDURE — 2580000003 HC RX 258

## 2024-07-16 PROCEDURE — 6360000002 HC RX W HCPCS

## 2024-07-16 PROCEDURE — 80048 BASIC METABOLIC PNL TOTAL CA: CPT

## 2024-07-16 PROCEDURE — 97116 GAIT TRAINING THERAPY: CPT

## 2024-07-16 PROCEDURE — 6360000002 HC RX W HCPCS: Performed by: INTERNAL MEDICINE

## 2024-07-16 PROCEDURE — 85025 COMPLETE CBC W/AUTO DIFF WBC: CPT

## 2024-07-16 PROCEDURE — 99233 SBSQ HOSP IP/OBS HIGH 50: CPT | Performed by: INTERNAL MEDICINE

## 2024-07-16 PROCEDURE — 1200000000 HC SEMI PRIVATE

## 2024-07-16 PROCEDURE — 2580000003 HC RX 258: Performed by: INTERNAL MEDICINE

## 2024-07-16 PROCEDURE — 97530 THERAPEUTIC ACTIVITIES: CPT

## 2024-07-16 PROCEDURE — 82948 REAGENT STRIP/BLOOD GLUCOSE: CPT

## 2024-07-16 RX ADMIN — HYOSCYAMINE SULFATE: 16 SOLUTION at 09:58

## 2024-07-16 RX ADMIN — INSULIN LISPRO 10 UNITS: 100 INJECTION, SOLUTION INTRAVENOUS; SUBCUTANEOUS at 17:10

## 2024-07-16 RX ADMIN — MEROPENEM 1000 MG: 1 INJECTION INTRAVENOUS at 13:35

## 2024-07-16 RX ADMIN — MEROPENEM 1000 MG: 1 INJECTION INTRAVENOUS at 05:48

## 2024-07-16 RX ADMIN — INSULIN LISPRO 2 UNITS: 100 INJECTION, SOLUTION INTRAVENOUS; SUBCUTANEOUS at 09:59

## 2024-07-16 RX ADMIN — SODIUM CHLORIDE, PRESERVATIVE FREE 10 ML: 5 INJECTION INTRAVENOUS at 13:41

## 2024-07-16 RX ADMIN — LISINOPRIL 40 MG: 40 TABLET ORAL at 09:58

## 2024-07-16 RX ADMIN — INSULIN LISPRO 6 UNITS: 100 INJECTION, SOLUTION INTRAVENOUS; SUBCUTANEOUS at 17:10

## 2024-07-16 RX ADMIN — INSULIN GLARGINE 15 UNITS: 100 INJECTION, SOLUTION SUBCUTANEOUS at 20:29

## 2024-07-16 RX ADMIN — ACETAMINOPHEN 650 MG: 325 TABLET ORAL at 23:22

## 2024-07-16 RX ADMIN — INSULIN LISPRO 10 UNITS: 100 INJECTION, SOLUTION INTRAVENOUS; SUBCUTANEOUS at 12:24

## 2024-07-16 RX ADMIN — ENOXAPARIN SODIUM 40 MG: 100 INJECTION SUBCUTANEOUS at 09:59

## 2024-07-16 RX ADMIN — AMLODIPINE BESYLATE 2.5 MG: 2.5 TABLET ORAL at 09:58

## 2024-07-16 RX ADMIN — INSULIN LISPRO 4 UNITS: 100 INJECTION, SOLUTION INTRAVENOUS; SUBCUTANEOUS at 12:23

## 2024-07-16 RX ADMIN — ESCITALOPRAM OXALATE 10 MG: 10 TABLET, FILM COATED ORAL at 10:01

## 2024-07-16 RX ADMIN — INSULIN LISPRO 10 UNITS: 100 INJECTION, SOLUTION INTRAVENOUS; SUBCUTANEOUS at 09:59

## 2024-07-16 RX ADMIN — ACETAMINOPHEN 650 MG: 325 TABLET ORAL at 09:57

## 2024-07-16 ASSESSMENT — PAIN SCALES - WONG BAKER: WONGBAKER_NUMERICALRESPONSE: NO HURT

## 2024-07-16 ASSESSMENT — PAIN SCALES - GENERAL
PAINLEVEL_OUTOF10: 8
PAINLEVEL_OUTOF10: 0

## 2024-07-16 ASSESSMENT — PAIN - FUNCTIONAL ASSESSMENT: PAIN_FUNCTIONAL_ASSESSMENT: ACTIVITIES ARE NOT PREVENTED

## 2024-07-16 ASSESSMENT — PAIN DESCRIPTION - ORIENTATION: ORIENTATION: RIGHT

## 2024-07-16 ASSESSMENT — PAIN DESCRIPTION - LOCATION: LOCATION: LEG

## 2024-07-16 ASSESSMENT — PAIN DESCRIPTION - DESCRIPTORS: DESCRIPTORS: ACHING

## 2024-07-16 NOTE — PROGRESS NOTES
Teaching Note:    I was present with the resident during the visit.  I discussed the case with the resident and agree with the findings and the plan as documented in the residents note.    Cb Phelan DPM, FACFAS  Podiatric Medicine & Surgery       [facility]

## 2024-07-16 NOTE — PROGRESS NOTES
partial calcanectomy with wound debridement.  Patient denies any nausea, fever, cough, chills, shortness of breath or chest pain.  Patient denies any other pedal concerns at this time.    7/3/24  Patient was seen bedside today with Dr. Phelan.  Patient is alert and oriented to person, place, time, and event. Patient's family is present at bedside. Patient relates she is feeling well overall and denies pain to the right lower extremity. Patient states she had a good night's sleep last night. Patient's family states that the patient was coughing last night and she did vomit. Patient denies nausea, vomiting, fever, chills, shortness of breath, or chest pain at this time. Denies any other pedal complaints at this time.    7/2/24  Patient was seen bedside today alongside Dr. Phelan.  Patient relates minimal pain to the right lower extremity.  States that she overall feels well however is tired and would like to sleep.  Family at bedside appreciative the visit and wishes to discuss prognosis of wound and care from this point.  Patient denies any N/V/F/C/SOB or CP. No other pedal complaints.    HPI  Patient is a 74 y.o. female with a history of diabetes mellitus and history of chronic right heel ulcer seen bedside today on behalf of Dr Phelan. Patient appeared pleasant, was oriented to person, place and time and in no acute distress. Patient states her daughter noticed her heel had turned a \"darker color\" and felt \"hollow\". Patient states she has been receiving wound care with Dr. Phelan in [place] and has been using b&w on the wound. However, she states felt like she was having chills yesterday and her daughters stated they felt her heel wound had gotten worse so she decided to come to the ED today to have it looked at. Patient denies any N/V/F/SOB or CP. No other pedal concerns.      Current Medications:    Current Facility-Administered Medications: meropenem (MERREM) 1,000 mg in sodium chloride 0.9 % 100 mL IVPB (mini-bag),    Musculoskeletal: Muscle strength and ROM testing deferred. Pain with palpation of right heel. Foot and ankle in grossly rectus alignment.    7/16  There is no exposed bone at this time. The wound bed is progressing and having more granulation tissue present. There are still a few areas of gangrenous, nonviable tissue present but overall the wound has improved significantly. There is no drainage or malodor present.          ASSESSMENT  Principle  1.  Chronic right heel ulcer, bone exposure  2.  Diabetes with peripheral neuropathy    Chronic  Patient Active Problem List   Diagnosis    Diabetic ulcer of right heel associated with type 2 diabetes mellitus (HCC)    Non-healing open wound of heel    Osteomyelitis of right foot (HCC)    Hypomagnesemia    Depression    Anxiety disorder    Sinus arrhythmia    Acute blood loss anemia    Chronic iron deficiency anemia    Type 2 diabetes mellitus treated without insulin (HCC)    Metabolic encephalopathy    Essential hypertension    Thrombocytosis    Hypokalemia       PLAN:   - Pt. is a 74 y.o. female examined and evaluated.  - WBC 6.7; afebrile  - Culture anaerobic and aerobic soft tissue right foot: final result - klebsiella pneumoniae, streptococcus agalactiae - group B, enterococcus avium - group D  - X-rays and MRI reviewed, noted involvement of the plantar aspect of the calcaneus with lytic destruction. 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 Question involvement of the talus as well.   - NWB RLE  - Patient is scheduled to be discharged today  - Patient relates she will be discharged home and will have hospice care  - Hospice care to perform daily dressing changes of Dakin's solution wet-to-dry  - We did have a discussion with the patient and patient's daughter and at this time patient would prefer to go home and have hospice care. Patient understands that she will not have wound VAC  therapy provided by hospice. We discussed and educated with the patient that the current treatment with wound VAC therapy is significantly improving the wound and there is no longer bone exposed. We discussed and educated with the patient that it is our recommendation to continue wound VAC therapy as this is showing to be effective. The patient's daughter states that she agrees with our recommendation. The patient herself relates that she does not prefer to have wound VAC therapy performed by home health or go to a SNF.  - Per the patient, she wishes to go home and have hospice care perform Dakin's wet to dry dressing changes daily.  - Patient can follow-up outpatient in [place] with Dr. Phelan in 2-3 weeks    DISPO: Patient is stable from a podiatry standpoint. Patient can follow-up outpatient in [place] with Dr. Phelan in 2-3 weeks    Channing Ibarra DPM PGY-2  7/16/2024   4:40 PM

## 2024-07-16 NOTE — PALLIATIVE CARE
Follow Up / Progress Note        Patient:   Niya Blankenship  YOB: 1950  Age:  74 y.o.  Room:  [facility]12/012-A  MRN:  [id number]         Family/Patient Discussion:  0829 Received call from Johan to discuss potential hospice care for his mother. Educated Johan on the focus and goals for hospice care and how choosing hospice means stopping medical interventions to treat disease and focus on symptom control. Shared that hospice will evaluate medicine and other interventions to decide if they align with the goal of symptom control and comfort, if they do not they will typically stop those interventions. Shared with Johan the medical team is looking to Niya to make the decision and we understand she doesn't want surgery or amputation on her foot and this may lead to eventual overwhelming infection and sepsis. Johan is unclear if Niya understands what she is choosing and asked this RN to meet with Niya to ensure she understands her choices.     840 Met with Niya and Cierra (daughter) at bedside. Introduced palliative care and role on team. Explained to Niya what going on hospice care means and how our goals of care change by choosing hospice care. Explained hospice treats symptoms and our interventions are aimed at reducing suffering but will not result in curing her medical condition. Niya shared she is ready to go to her \"heavenly home\" and she is tired. Shared with Niya that Nilay, Johan, and Cierra are struggling with that decision. Niya would quickly defer to \"whatever they think is best\" Niya would then quickly revert back to \"I want to go home, my heavenly home.\" This RN reflected for Cierra and Niya that Niya seems to want to choose hospice care and no more medical interventions but her family seems to be struggling with that. Cierra shared she will honor what Niya wants. Shared with Niya and Cierra Prado is calling at 1015 and will join them in the room during the call

## 2024-07-16 NOTE — CARE COORDINATION
7/16/24, 3:56 PM EDT    DISCHARGE PLANNING EVALUATION    Spoke with patient and daughter, updated that [facility] will accept and can begin services when she arrives home.       Discussed plans for transport home, other family members had mentioned ambulette.  Daughter is unsure, states patient's son and daughter in law will be here later today and they will discuss.     Updated RN      If family arranges their own transportation and discharges today, please notify [facility], [phone].  If needing transport arranged, will work on ambulette transport for tomorrow.

## 2024-07-16 NOTE — PALLIATIVE CARE
Follow Up / Progress Note        Patient:   Niya Blankenship  YOB: 1950  Age:  74 y.o.  Room:  [facility]12/012-A  MRN:  [id number]         Family/Patient Discussion:  1015 Spoke with Johan and relayed conversation with Niya and Cierra. Shared content of conversation with Niya including that she is choosing to prepare for a focus on quality of life and not prolong her life. Shared with Johan Núñez made references to going to her heavenly home and during her conversation Niya understood what she is choosing but is struggling because she knows the family is struggling. At times she would defer to \"whatever they think was best\" and then go back to discussing being ready to go to her heavenly home. Reflected for Johan that in the last phase of our lives we may change our focus to preparing for a comfortable dignified death rather than continuing medical interventions and that may be a way of understanding her decision differently. Their time as a family can be spent with her focused on being with her and sharing time rather than hospitalizations and doctor's visits. Johan verbalized understanding and is ok with proceeding with the referral to hospice care as the discharge plan. Shared with him per the  we are awaiting a decision from [place] [facility] on acceptance. Shared with Cierra and Niya in the room we await a decision from [place] [facility].       Plan/Follow-Up:  Palliative care will be available as needed, please call for additional needs.          Electronically signed by Johnson Interiano RN on 7/16/2024 at 10:45 AM             Palliative Care Office: [phone]

## 2024-07-16 NOTE — PROGRESS NOTES
PROGRESS NOTE      Patient:  Niya Blankenship  Unit/Bed:7K-12/012-A  YOB: 1950  MRN: [id number]   Acct: [id number]    PCP: Taylor Maza APRN - NP    Date of Admission: 7/1/2024 LOS: 15    Date of Evaluation:  7/16/2024    Anticipated Discharge: pending dispo    Assessment/Plan:    Osteomyelitis of right foot with leukocytosis  S/P excisional debridement of right heel and partial right calcanectomy on 7/5  MRI right foot 7/2: Soft tissue defect in the plantar soft tissues over the calcaneus. Abnormal signal intensity in the calcaneus consistent with involvement by osteomyelitis. Abnormal signal intensity in the plantar soft tissues over the medial aspect of the calcaneus consistent with inflammatory process. There was air in the soft tissues seen on the plain radiographs. Degenerative changes. Osteochondral defect in the talar dome. Spurring at the attachment of the Achilles tendon upon the calcaneus. Soft tissue swelling over the foot.   X-ray right foot 7/1: No fracture or dislocation   Podiatry following, continue to appreciate recommendations.   Taken to the OR on 7/5 for right heel partial calcanectomy and debridement of wound.  Patient tolerated procedure well. Continue wound vac. Pt and family agreeable to SNF.   Continue daily dressing   MRSA negative   CRP 15.80 on 7/2  ID following, appreciate recommendations  S/p vancomycin (7/1-7/3)  s/p Zosyn (7/1-7/5). Pt initially wanting to transition to po Abx, but as pt is now amenable to SNF at [place] may consider contining IV Abx at [place] vs oral Abx. Will follow up ID for recs.    blood culture x 2 NG 5D  Heel culture grew Pseudomonas aeruginosa   Bone and foot cx: probable ESBL producing Klebsiella, Strep agalactiae and Enterococcus avium   Started on meropenem per ID (7/8-  Leukocytosis improving.  White blood cell count as of 7/16 is 6.7  Of note, Per chart review, Patient underwent bedside debridement on 03/27 then on 03/29 she underwent surgical        Diet: ADULT ORAL NUTRITION SUPPLEMENT; Dinner; Wound Healing Oral Supplement  ADULT DIET; Regular; 5 carb choices (75 gm/meal)    Microbiology: yes - reviewed  Antibiotics: yes - continue     Steroids: no    Telemetry: []Yes / [x]No  Telemetry Review of past 24 hours: Not Applicable    LDA: []CVC / []PICC / [x]Midline / []Sears / []Drains / []Mediport / []PIV / []None    Labs (still needed?): []Yes / [x]No  IVF (still needed?): []Yes / [x]No    Level of care: []Step Down / [x]Med-Surg  Bed Status: [x]Inpatient / []Observation    DVT Prophylaxis: [x] Lovenox / [] Heparin / [] SCDs / [] Already on Systemic Anticoagulation / [] None     PT/OT: [x]Yes / []No    Disposition:    [] Home       [] TCU       [] Rehab       [] Psych       [] SNF       [] Long Term Care Facility       [x] Other- Home with hospice     Code Status: DNR-CCA      An electronic signature was used to authenticate this note  - Josefina Loya MD PGY-2 on 7/16/2024 at 12:23 PM

## 2024-07-16 NOTE — CARE COORDINATION
7/16/24, 9:25 AM EDT    DISCHARGE ON GOING EVALUATION    [facility] day: 15  Location: -12/012-A Reason for admit: Type 2 diabetes mellitus with right diabetic foot ulcer (HCC) [E11.621, L97.519]  Anemia, unspecified type [D64.9]  Leukocytosis, unspecified type [D72.829]  Non-healing open wound of heel, right, initial encounter [S91.301A]     Barriers to Discharge: POC and dispo planning.    PCP: Taylor Maza APRN - NP  Readmission Risk Score: 12.4    Patient Goals/Plan/Treatment Preferences: Hospice consult pending.     4780 Received a call from Jorge Luis at [facility]. Updated on current POC. Home wound vac referral is still on hold until decision is made.     1400 Review of palliative care note reveals that family and patient are wanting to proceed with hospice consult still. SW waiting for decision from [facility]. CM updated Jorge Luis, who still has home vac application on hold.

## 2024-07-16 NOTE — PLAN OF CARE
Problem: Discharge Planning  Goal: Discharge to home or other facility with appropriate resources  7/16/2024 1540 by Kesha Brannon LPN  Outcome: Progressing  Flowsheets (Taken 7/16/2024 1540)  Discharge to home or other facility with appropriate resources:   Identify barriers to discharge with patient and caregiver   Arrange for needed discharge resources and transportation as appropriate     Problem: Safety - Adult  Goal: Free from fall injury  7/16/2024 1540 by Kesha Brannon LPN  Outcome: Progressing  Flowsheets (Taken 7/16/2024 1540)  Free From Fall Injury: Instruct family/caregiver on patient safety     Problem: Skin/Tissue Integrity  Goal: Absence of new skin breakdown  Description: 1.  Monitor for areas of redness and/or skin breakdown  2.  Assess vascular access sites hourly  3.  Every 4-6 hours minimum:  Change oxygen saturation probe site  4.  Every 4-6 hours:  If on nasal continuous positive airway pressure, respiratory therapy assess nares and determine need for appliance change or resting period.  7/16/2024 1540 by Kesha Brannon LPN  Outcome: Progressing  Note: No new skin breakdown noted at this time this shift.     Problem: Pain  Goal: Verbalizes/displays adequate comfort level or baseline comfort level  7/16/2024 1540 by Kesha Brannon LPN  Outcome: Progressing  Flowsheets (Taken 7/16/2024 1540)  Verbalizes/displays adequate comfort level or baseline comfort level:   Encourage patient to monitor pain and request assistance   Assess pain using appropriate pain scale   Administer analgesics based on type and severity of pain and evaluate response   Implement non-pharmacological measures as appropriate and evaluate response     Problem: Skin/Tissue Integrity - Adult  Goal: Skin integrity remains intact  7/16/2024 1540 by Kesha Brannon LPN  Outcome: Progressing  Flowsheets (Taken 7/16/2024 1540)  Skin Integrity Remains Intact: Monitor for areas of redness and/or skin breakdown

## 2024-07-16 NOTE — PROGRESS NOTES
[facility]  INPATIENT PHYSICAL THERAPY  DAILY NOTE  [facility] ORTHOPEDICS 7K - 7K-12/012-A    Time In: 1006  Time Out: 1036  Timed Code Treatment Minutes: 30 Minutes  Minutes: 30          Date: 2024  Patient Name: Niya Blankenship,  Gender:  female        MRN: [id number]  : 1950  (74 y.o.)     Referring Practitioner: Josefina Loya MD  Diagnosis: Diabetic ulcer of right heel associated with type 2 diabetes mellitus        Prior Level of Function:  Lives With: Daughter (and her family; pt lives in [facility] so has multiple family members able to assist)  Type of Home: House  Home Layout: One level  Home Access: Ramped entrance  Home Equipment: Rollator, Wheelchair - Manual   Bathroom Equipment: Commode    ADL Assistance: Independent  Homemaking Assistance: Needs assistance (family completes)  Ambulation Assistance: Non-ambulatory (has been nonambulatory since  due to heel wound; uses w/c for mobility)  Transfer Assistance: Independent  Active : No    Restrictions/Precautions:  Restrictions/Precautions: Weight Bearing, Fall Risk  Right Lower Extremity Weight Bearing: Non Weight Bearing  Position Activity Restriction  Other position/activity restrictions: wound vac R heel     SUBJECTIVE: RN approves patient for PT today. Upon arrival patient is mildly confused and needs reminded the purpose of today's activities. She is pleasant and agreeable throughout session when redirected.    PAIN: Denies    Vitals: Vitals not assessed per clinical judgement, see nursing flowsheet    OBJECTIVE:  Bed Mobility:  Supine to Sit: Minimal Assistance with HOB elevated    Transfers:  Sit to Stand: Contact Guard Assistance  Stand to Sit:Contact Guard Assistance  Stand Pivot:Minimal Assistance, Moderate Assistance    Ambulation:  Moderate Assistance  Distance: 6 ft   Surface: Level Tile  Device: Rolling Walker  Gait Deviations:  Forward Flexed Posture, Decreased Step Length on Left, Decreased Weight  Continued Education    Goals:  Patient Goals : go home  Short Term Goals  Time Frame for Short Term Goals: at discharge  Short Term Goal 1: Pt to be Mod I for supine <> sit to get in/out of bed  Short Term Goal 2: Pt to be SBA for sit <> stand to get up to ambulate  Short Term Goal 3: Pt to be SBA for stand-pivot with walker with NWB R LE to get between seats  Short Term Goal 4: Pt to ambulate >5 ft with walker with NWB R LE to ambulate short distances  Long Term Goals  Time Frame for Long Term Goals : not set due to short ELOS    Following session, patient left in safe position with all fall risk precautions in place.

## 2024-07-16 NOTE — PLAN OF CARE
Problem: Discharge Planning  Goal: Discharge to home or other facility with appropriate resources  7/16/2024 0417 by Lisa Blake RN  Outcome: Progressing  Flowsheets (Taken 7/16/2024 0417)  Discharge to home or other facility with appropriate resources:   Identify barriers to discharge with patient and caregiver   Arrange for needed discharge resources and transportation as appropriate   Identify discharge learning needs (meds, wound care, etc)     Problem: Safety - Adult  Goal: Free from fall injury  7/16/2024 0417 by Lisa Blake, RN  Outcome: Progressing  Flowsheets (Taken 7/16/2024 0417)  Free From Fall Injury: Instruct family/caregiver on patient safety     Problem: Skin/Tissue Integrity  Goal: Absence of new skin breakdown  Description: 1.  Monitor for areas of redness and/or skin breakdown  2.  Assess vascular access sites hourly  3.  Every 4-6 hours minimum:  Change oxygen saturation probe site  4.  Every 4-6 hours:  If on nasal continuous positive airway pressure, respiratory therapy assess nares and determine need for appliance change or resting period.  7/16/2024 0417 by Lisa Blake RN  Outcome: Progressing     Problem: Pain  Goal: Verbalizes/displays adequate comfort level or baseline comfort level  7/16/2024 0417 by Lisa Blake RN  Outcome: Progressing  Flowsheets (Taken 7/16/2024 0417)  Verbalizes/displays adequate comfort level or baseline comfort level:   Encourage patient to monitor pain and request assistance   Assess pain using appropriate pain scale   Administer analgesics based on type and severity of pain and evaluate response   Implement non-pharmacological measures as appropriate and evaluate response     Problem: Skin/Tissue Integrity - Adult  Goal: Skin integrity remains intact  7/16/2024 0417 by Lisa Blake, RN  Outcome: Progressing  Flowsheets (Taken 7/16/2024 0417)  Skin Integrity Remains Intact: Monitor for areas of redness and/or skin breakdown     Problem:

## 2024-07-16 NOTE — CARE COORDINATION
7/16/24, 8:53 AM EDT    DISCHARGE PLANNING EVALUATION    Spoke with [facility], agency is reviewing.  If hospice accepts, plan is for home with family, will need ambulette transport arranged at time of discharge.

## 2024-07-17 VITALS
HEIGHT: 64 IN | OXYGEN SATURATION: 98 % | WEIGHT: 130 LBS | RESPIRATION RATE: 18 BRPM | SYSTOLIC BLOOD PRESSURE: 143 MMHG | TEMPERATURE: 98.1 F | BODY MASS INDEX: 22.2 KG/M2 | DIASTOLIC BLOOD PRESSURE: 57 MMHG | HEART RATE: 93 BPM

## 2024-07-17 LAB
ANION GAP SERPL CALC-SCNC: 10 MEQ/L (ref 8–16)
BASOPHILS ABSOLUTE: 0.1 THOU/MM3 (ref 0–0.1)
BASOPHILS NFR BLD AUTO: 1.4 %
BUN SERPL-MCNC: 18 MG/DL (ref 7–22)
CALCIUM SERPL-MCNC: 8.5 MG/DL (ref 8.5–10.5)
CHLORIDE SERPL-SCNC: 103 MEQ/L (ref 98–111)
CO2 SERPL-SCNC: 24 MEQ/L (ref 23–33)
CREAT SERPL-MCNC: 0.7 MG/DL (ref 0.4–1.2)
DEPRECATED RDW RBC AUTO: 46.6 FL (ref 35–45)
EOSINOPHIL NFR BLD AUTO: 7.1 %
EOSINOPHILS ABSOLUTE: 0.4 THOU/MM3 (ref 0–0.4)
ERYTHROCYTE [DISTWIDTH] IN BLOOD BY AUTOMATED COUNT: 15 % (ref 11.5–14.5)
GFR SERPL CREATININE-BSD FRML MDRD: 90 ML/MIN/1.73M2
GLUCOSE BLD STRIP.AUTO-MCNC: 191 MG/DL (ref 70–108)
GLUCOSE BLD STRIP.AUTO-MCNC: 229 MG/DL (ref 70–108)
GLUCOSE SERPL-MCNC: 191 MG/DL (ref 70–108)
HCT VFR BLD AUTO: 27.9 % (ref 37–47)
HGB BLD-MCNC: 8.5 GM/DL (ref 12–16)
IMM GRANULOCYTES # BLD AUTO: 0.04 THOU/MM3 (ref 0–0.07)
IMM GRANULOCYTES NFR BLD AUTO: 0.6 %
LYMPHOCYTES ABSOLUTE: 1.8 THOU/MM3 (ref 1–4.8)
LYMPHOCYTES NFR BLD AUTO: 29.5 %
MCH RBC QN AUTO: 26 PG (ref 26–33)
MCHC RBC AUTO-ENTMCNC: 30.5 GM/DL (ref 32.2–35.5)
MCV RBC AUTO: 85.3 FL (ref 81–99)
MONOCYTES ABSOLUTE: 0.7 THOU/MM3 (ref 0.4–1.3)
MONOCYTES NFR BLD AUTO: 11.6 %
NEUTROPHILS ABSOLUTE: 3.1 THOU/MM3 (ref 1.8–7.7)
NEUTROPHILS NFR BLD AUTO: 49.8 %
NRBC BLD AUTO-RTO: 0 /100 WBC
PLATELET # BLD AUTO: 403 THOU/MM3 (ref 130–400)
PMV BLD AUTO: 11.2 FL (ref 9.4–12.4)
POTASSIUM SERPL-SCNC: 4.1 MEQ/L (ref 3.5–5.2)
RBC # BLD AUTO: 3.27 MILL/MM3 (ref 4.2–5.4)
SODIUM SERPL-SCNC: 137 MEQ/L (ref 135–145)
WBC # BLD AUTO: 6.2 THOU/MM3 (ref 4.8–10.8)

## 2024-07-17 PROCEDURE — 99239 HOSP IP/OBS DSCHRG MGMT >30: CPT | Performed by: FAMILY MEDICINE

## 2024-07-17 PROCEDURE — 85025 COMPLETE CBC W/AUTO DIFF WBC: CPT

## 2024-07-17 PROCEDURE — 6370000000 HC RX 637 (ALT 250 FOR IP)

## 2024-07-17 PROCEDURE — 80048 BASIC METABOLIC PNL TOTAL CA: CPT

## 2024-07-17 PROCEDURE — 82948 REAGENT STRIP/BLOOD GLUCOSE: CPT

## 2024-07-17 PROCEDURE — 36415 COLL VENOUS BLD VENIPUNCTURE: CPT

## 2024-07-17 PROCEDURE — 6360000002 HC RX W HCPCS

## 2024-07-17 RX ORDER — AMLODIPINE BESYLATE 2.5 MG/1
2.5 TABLET ORAL DAILY
Qty: 30 TABLET | Refills: 3 | Status: SHIPPED | OUTPATIENT
Start: 2024-07-18

## 2024-07-17 RX ORDER — LISINOPRIL 40 MG/1
40 TABLET ORAL DAILY
Qty: 30 TABLET | Refills: 3 | Status: SHIPPED | OUTPATIENT
Start: 2024-07-18

## 2024-07-17 RX ADMIN — INSULIN LISPRO 2 UNITS: 100 INJECTION, SOLUTION INTRAVENOUS; SUBCUTANEOUS at 12:31

## 2024-07-17 RX ADMIN — AMLODIPINE BESYLATE 2.5 MG: 2.5 TABLET ORAL at 08:53

## 2024-07-17 RX ADMIN — INSULIN LISPRO 10 UNITS: 100 INJECTION, SOLUTION INTRAVENOUS; SUBCUTANEOUS at 08:51

## 2024-07-17 RX ADMIN — INSULIN LISPRO 10 UNITS: 100 INJECTION, SOLUTION INTRAVENOUS; SUBCUTANEOUS at 12:31

## 2024-07-17 RX ADMIN — HYOSCYAMINE SULFATE: 16 SOLUTION at 09:50

## 2024-07-17 RX ADMIN — LISINOPRIL 40 MG: 40 TABLET ORAL at 08:53

## 2024-07-17 RX ADMIN — ENOXAPARIN SODIUM 40 MG: 100 INJECTION SUBCUTANEOUS at 08:51

## 2024-07-17 RX ADMIN — ESCITALOPRAM OXALATE 10 MG: 10 TABLET, FILM COATED ORAL at 08:53

## 2024-07-17 ASSESSMENT — PAIN DESCRIPTION - ORIENTATION: ORIENTATION: RIGHT

## 2024-07-17 ASSESSMENT — PAIN DESCRIPTION - DESCRIPTORS: DESCRIPTORS: ACHING

## 2024-07-17 ASSESSMENT — PAIN DESCRIPTION - LOCATION: LOCATION: LEG

## 2024-07-17 NOTE — PROGRESS NOTES
Discharge instructions reviewed with patient, daughter Fadia and son in law. Questions answered and they stated understanding. Medications were delivered via outpatient pharmacy.  is planning to arrive around 1330.

## 2024-07-17 NOTE — CARE COORDINATION
7/17/24, 9:15 AM EDT    DISCHARGE PLANNING EVALUATION    Family has arranged for transportation home today.  [facility]  [facility] will provide services at discharge, planning start of care today once patient arrives home.      7/17/24, 9:16 AM EDT    Patient goals/plan/ treatment preferences discussed by  and .  Patient goals/plan/ treatment preferences reviewed with patient/ family.  Patient/ family verbalize understanding of discharge plan and are in agreement with goal/plan/treatment preferences.  Understanding was demonstrated using the teach back method.  AVS provided by RN at time of discharge, which includes all necessary medical information pertaining to the patients current course of illness, treatment, post-discharge goals of care, and treatment preferences.     Services At/After Discharge: Hospice

## 2024-07-17 NOTE — PROGRESS NOTES
Wet to dry dressing change completed. Daughter Fadia and her  at bedside and stated understanding. Multiple family members will be assisting with daily dressing changes.

## 2024-07-17 NOTE — DISCHARGE SUMMARY
DISCHARGE SUMMARY      Patient Identification:   Niya Blankenship   : 1950  MRN: [id number]   Account: [id number]      Patient's PCP: Taylor Maza APRN - NP    Admit Date: 2024, 1319     Discharge Date: 24     Admitting Physician: Jeramy Azul DO     Discharge Physician: Dr. Nicole Fernandez MD    Discharge Diagnoses:    Osteomyelitis of right foot with leukocytosis  S/P excisional debridement of right heel and partial right calcanectomy on   Sinus arrhythmia noted on pre-op EKG   Acute on chronic anemia in setting of iron deficiency anemia and recent surgery  Type II diabetes not on chronic insulin  Acute on chronic metabolic encephalopathy, stable  Hypertension  Thrombocytosis, possibly secondary to infection, improving  Hyponatremia, improving  Hypokalemia, improving  Depression/anxiety  Deconditioning     The patient was seen and examined on day of discharge and this discharge summary is in conjunction with any daily progress note from day of discharge.    Hospital Course:   Niya Blankenship is a 74 y.o. female with  has a past medical history of Diabetes mellitus (HCC) was admitted to [facility] on 2024 for Diabetic R heel wound, confusion       Hospital Course By Problem:     Osteomyelitis of right foot with leukocytosis  S/P excisional debridement of right heel and partial right calcanectomy on   MRI right foot : Soft tissue defect in the plantar soft tissues over the calcaneus. Abnormal signal intensity in the calcaneus consistent with involvement by osteomyelitis. Abnormal signal intensity in the plantar soft tissues over the medial aspect of the calcaneus consistent with inflammatory process. There was air in the soft tissues seen on the plain radiographs. Degenerative changes. Osteochondral defect in the talar dome. Spurring at the attachment of the Achilles tendon upon the calcaneus. Soft tissue swelling over the foot.   X-ray right foot : No fracture or  appointment, Appt time: 9:30am     Please go to [place] location for follow up in 3 weeks,  [address] [facility]  [facility]  [address] [place] [address] [phone]  Call in 1 day(s)  [phone]         Discharge Medications:        Medication List        START taking these medications      amLODIPine 2.5 MG tablet  Commonly known as: NORVASC  Take 1 tablet by mouth daily  Start taking on: July 18, 2024            CHANGE how you take these medications      lisinopril 40 MG tablet  Commonly known as: PRINIVIL;ZESTRIL  Take 1 tablet by mouth daily  Start taking on: July 18, 2024  What changed:   medication strength  how much to take            CONTINUE taking these medications      glimepiride 1 MG tablet  Commonly known as: AMARYL     Lexapro 10 MG tablet  Generic drug: escitalopram     metFORMIN 1000 MG tablet  Commonly known as: GLUCOPHAGE     * vitamin D 50 MCG (2000 UT) Caps capsule     * Vitamin D3 1.25 MG (10832 UT) Caps           * This list has 2 medication(s) that are the same as other medications prescribed for you. Read the directions carefully, and ask your doctor or other care provider to review them with you.                STOP taking these medications      cephALEXin 500 MG capsule  Commonly known as: KEFLEX     ciprofloxacin 500 MG tablet  Commonly known as: CIPRO               Where to Get Your Medications        These medications were sent to [facility] [address] -  [phone] - F [phone]  [address] [address]      Phone: [phone]   amLODIPine 2.5 MG tablet  lisinopril 40 MG tablet           Electronically signed by Josefina Loya MD, FM PGY - 2, on 7/17/2024 at 6:19 PM

## 2024-07-17 NOTE — PLAN OF CARE
Problem: Discharge Planning  Goal: Discharge to home or other facility with appropriate resources  7/16/2024 2117 by Mayra Brink RN  Outcome: Progressing  Flowsheets (Taken 7/16/2024 2117)  Discharge to home or other facility with appropriate resources:   Identify barriers to discharge with patient and caregiver   Arrange for needed discharge resources and transportation as appropriate   Arrange for interpreters to assist at discharge as needed   Identify discharge learning needs (meds, wound care, etc)     Problem: Safety - Adult  Goal: Free from fall injury  7/16/2024 2117 by Mayra Brink RN  Outcome: Progressing  Flowsheets (Taken 7/16/2024 2117)  Free From Fall Injury:   Instruct family/caregiver on patient safety   Based on caregiver fall risk screen, instruct family/caregiver to ask for assistance with transferring infant if caregiver noted to have fall risk factors     Problem: Skin/Tissue Integrity  Goal: Absence of new skin breakdown  Description: 1.  Monitor for areas of redness and/or skin breakdown  2.  Assess vascular access sites hourly  3.  Every 4-6 hours minimum:  Change oxygen saturation probe site  4.  Every 4-6 hours:  If on nasal continuous positive airway pressure, respiratory therapy assess nares and determine need for appliance change or resting period.  7/16/2024 2117 by Mayra Brink RN  Outcome: Progressing     Problem: Pain  Goal: Verbalizes/displays adequate comfort level or baseline comfort level  7/16/2024 2117 by Mayra Brink RN  Outcome: Progressing  Flowsheets (Taken 7/16/2024 2117)  Verbalizes/displays adequate comfort level or baseline comfort level:   Encourage patient to monitor pain and request assistance   Assess pain using appropriate pain scale   Implement non-pharmacological measures as appropriate and evaluate response   Administer analgesics based on type and severity of pain and evaluate response     Problem: Skin/Tissue Integrity -  Adult  Goal: Skin integrity remains intact  7/16/2024 2117 by Mayra Brink RN  Outcome: Progressing  Flowsheets (Taken 7/16/2024 2117)  Skin Integrity Remains Intact:   Monitor for areas of redness and/or skin breakdown   Assess vascular access sites hourly     Problem: Chronic Conditions and Co-morbidities  Goal: Patient's chronic conditions and co-morbidity symptoms are monitored and maintained or improved  7/16/2024 2117 by Mayra Brink RN  Outcome: Progressing  Flowsheets (Taken 7/16/2024 2117)  Care Plan - Patient's Chronic Conditions and Co-Morbidity Symptoms are Monitored and Maintained or Improved:   Monitor and assess patient's chronic conditions and comorbid symptoms for stability, deterioration, or improvement   Update acute care plan with appropriate goals if chronic or comorbid symptoms are exacerbated and prevent overall improvement and discharge   Collaborate with multidisciplinary team to address chronic and comorbid conditions and prevent exacerbation or deterioration     Problem: Nutrition Deficit:  Goal: Optimize nutritional status  7/16/2024 2117 by Mayra rBink RN  Outcome: Progressing  Flowsheets (Taken 7/16/2024 2117)  Nutrient intake appropriate for improving, restoring, or maintaining nutritional needs:   Assess nutritional status and recommend course of action   Monitor oral intake, labs, and treatment plans   Care plan reviewed with pt. Pt verbalizes understanding of plan of care and contributes to goal setting.

## 2024-08-02 NOTE — PROGRESS NOTES
Physician Progress Note      PATIENT:               WILMER BO  CSN #:                  [id number]  :                       1950  ADMIT DATE:       2024 1:19 PM  DISCH DATE:        2024 2:43 PM  RESPONDING  PROVIDER #:        MARTHA KIMBLE          QUERY TEXT:    Pt admitted with Diabetic foot ulcer with osteomyelitis found on imagining. Pt   noted to have initial ST, and elevated WBC. If possible, please document in   the progress notes and discharge summary if you are evaluating and /or   treating any of the following:  The medical record reflects the following:    Risk Factors: diabetic foot ulcer of the right foot admitted for worsening   necrosis.  Clinical Indicators: WBC 13.1->12.1->11.0 , ST HR 90-96, per podiatry note:   Chronic right heel ulcer, with bone exposure. heel culture with heavy growth   of gram positive bacilli most consistent with Corynebacterium species,   moderate growth of Enterococcus species, and light growth of multiple enteric   gram negative bacilli.  Treatment: Vanco and Zosyn, podiatry consulted, MRI    Thank You, Hollie CDS  Options provided:  -- Sepsis, related to right foot ulceration present on admission  -- Sepsis was ruled out  -- Other - I will add my own diagnosis  -- Disagree - Not applicable / Not valid  -- Disagree - Clinically unable to determine / Unknown  -- Refer to Clinical Documentation Reviewer    PROVIDER RESPONSE TEXT:    This patient has sepsis, related to right foot ulceration which was present on   admission.    Query created by: Alexia Giles on 7/3/2024 9:28 AM      Electronically signed by:  MARTHA KIMBLE 2024 2:51 PM

## (undated) PROCEDURE — 30233N1 TRANSFUSION OF NONAUTOLOGOUS RED BLOOD CELLS INTO PERIPHERAL VEIN, PERCUTANEOUS APPROACH: ICD-10-PCS

## (undated) PROCEDURE — 0JBQ0ZZ EXCISION OF RIGHT FOOT SUBCUTANEOUS TISSUE AND FASCIA, OPEN APPROACH: ICD-10-PCS

## (undated) PROCEDURE — 0QBL0ZZ EXCISION OF RIGHT TARSAL, OPEN APPROACH: ICD-10-PCS

## (undated) DEVICE — IMPREGNATED GAUZE DRESSING: Brand: CUTICERIN 7.5X20CM CTN 50

## (undated) DEVICE — GLOVE SURG SZ 8 L11.77IN FNGR THK9.8MIL STRW LTX POLYMER

## (undated) DEVICE — BASIC SINGLE BASIN BTC-LF: Brand: MEDLINE INDUSTRIES, INC.

## (undated) DEVICE — PENCIL SMK EVAC ALL IN 1 DSGN ENH VISIBILITY IMPROVED AIR

## (undated) DEVICE — GOWN,SIRUS,NONRNF,SETINSLV,XL,20/CS: Brand: MEDLINE

## (undated) DEVICE — DRAPE,EXTREMITY,89X128,STERILE: Brand: MEDLINE

## (undated) DEVICE — SOLUTION PREP PAINT POV IOD FOR SKIN MUCOUS MEM

## (undated) DEVICE — DRAPE,U/SHT,SPLIT,FILM,60X84,STERILE: Brand: MEDLINE

## (undated) DEVICE — DRESSING ALG W3XL4IN TRNSPAR ANTIMIC WND CNTCT LAYR W/

## (undated) DEVICE — SOLUTION SCRB 4OZ 7.5% POVIDONE IOD ANTIMIC BTL

## (undated) DEVICE — BANDAGE COMPR W4INXL12FT E DISP ESMARCH EVEN

## (undated) DEVICE — PREMIUM DRY TRAY LF: Brand: MEDLINE INDUSTRIES, INC.

## (undated) DEVICE — SET UP: Brand: MEDLINE INDUSTRIES, INC.

## (undated) DEVICE — HYPODERMIC SAFETY NEEDLE: Brand: MAGELLAN

## (undated) DEVICE — GLOVE ORANGE PI 8   MSG9080